# Patient Record
Sex: MALE | Race: WHITE | NOT HISPANIC OR LATINO | Employment: OTHER | URBAN - METROPOLITAN AREA
[De-identification: names, ages, dates, MRNs, and addresses within clinical notes are randomized per-mention and may not be internally consistent; named-entity substitution may affect disease eponyms.]

---

## 2020-12-02 ENCOUNTER — HOSPITAL ENCOUNTER (INPATIENT)
Facility: MEDICAL CENTER | Age: 75
LOS: 7 days | DRG: 871 | End: 2020-12-10
Attending: EMERGENCY MEDICINE | Admitting: STUDENT IN AN ORGANIZED HEALTH CARE EDUCATION/TRAINING PROGRAM
Payer: COMMERCIAL

## 2020-12-02 ENCOUNTER — APPOINTMENT (OUTPATIENT)
Dept: RADIOLOGY | Facility: MEDICAL CENTER | Age: 75
DRG: 871 | End: 2020-12-02
Attending: EMERGENCY MEDICINE
Payer: COMMERCIAL

## 2020-12-02 DIAGNOSIS — R53.81 DEBILITY: ICD-10-CM

## 2020-12-02 DIAGNOSIS — K52.9 ENTERITIS: ICD-10-CM

## 2020-12-02 DIAGNOSIS — E86.0 DEHYDRATION: ICD-10-CM

## 2020-12-02 DIAGNOSIS — N17.9 AKI (ACUTE KIDNEY INJURY) (HCC): ICD-10-CM

## 2020-12-02 DIAGNOSIS — R94.31 PROLONGED QT INTERVAL: ICD-10-CM

## 2020-12-02 PROCEDURE — 93005 ELECTROCARDIOGRAM TRACING: CPT | Performed by: EMERGENCY MEDICINE

## 2020-12-02 PROCEDURE — 96365 THER/PROPH/DIAG IV INF INIT: CPT

## 2020-12-02 PROCEDURE — 70450 CT HEAD/BRAIN W/O DYE: CPT

## 2020-12-02 PROCEDURE — 96367 TX/PROPH/DG ADDL SEQ IV INF: CPT

## 2020-12-02 PROCEDURE — 99285 EMERGENCY DEPT VISIT HI MDM: CPT

## 2020-12-02 ASSESSMENT — ENCOUNTER SYMPTOMS
VOMITING: 1
LOSS OF CONSCIOUSNESS: 1
NAUSEA: 1
FALLS: 1

## 2020-12-03 ENCOUNTER — APPOINTMENT (OUTPATIENT)
Dept: RADIOLOGY | Facility: MEDICAL CENTER | Age: 75
DRG: 871 | End: 2020-12-03
Attending: EMERGENCY MEDICINE
Payer: COMMERCIAL

## 2020-12-03 ENCOUNTER — APPOINTMENT (OUTPATIENT)
Dept: RADIOLOGY | Facility: MEDICAL CENTER | Age: 75
DRG: 871 | End: 2020-12-03
Attending: INTERNAL MEDICINE
Payer: COMMERCIAL

## 2020-12-03 PROBLEM — A41.9 SEPSIS (HCC): Status: ACTIVE | Noted: 2020-12-03

## 2020-12-03 PROBLEM — K56.7 ILEUS (HCC): Status: ACTIVE | Noted: 2020-12-03

## 2020-12-03 PROBLEM — N17.9 AKI (ACUTE KIDNEY INJURY) (HCC): Status: ACTIVE | Noted: 2020-12-03

## 2020-12-03 PROBLEM — I21.4 NSTEMI (NON-ST ELEVATED MYOCARDIAL INFARCTION) (HCC): Status: ACTIVE | Noted: 2020-12-03

## 2020-12-03 LAB
ALBUMIN SERPL BCP-MCNC: 3.7 G/DL (ref 3.2–4.9)
ALBUMIN/GLOB SERPL: 1.1 G/DL
ALP SERPL-CCNC: 84 U/L (ref 30–99)
ALT SERPL-CCNC: 11 U/L (ref 2–50)
ANION GAP SERPL CALC-SCNC: 16 MMOL/L (ref 7–16)
ANION GAP SERPL CALC-SCNC: 20 MMOL/L (ref 7–16)
ANION GAP SERPL CALC-SCNC: 26 MMOL/L (ref 7–16)
ANISOCYTOSIS BLD QL SMEAR: ABNORMAL
APPEARANCE UR: CLEAR
APTT PPP: 29.4 SEC (ref 24.7–36)
AST SERPL-CCNC: 16 U/L (ref 12–45)
BASOPHILS # BLD AUTO: 0 % (ref 0–1.8)
BASOPHILS # BLD AUTO: 0.1 % (ref 0–1.8)
BASOPHILS # BLD: 0 K/UL (ref 0–0.12)
BASOPHILS # BLD: 0.02 K/UL (ref 0–0.12)
BILIRUB SERPL-MCNC: 0.9 MG/DL (ref 0.1–1.5)
BILIRUB UR QL STRIP.AUTO: NEGATIVE
BUN SERPL-MCNC: 104 MG/DL (ref 8–22)
BUN SERPL-MCNC: 104 MG/DL (ref 8–22)
BUN SERPL-MCNC: 99 MG/DL (ref 8–22)
CALCIUM SERPL-MCNC: 10 MG/DL (ref 8.5–10.5)
CALCIUM SERPL-MCNC: 8.8 MG/DL (ref 8.5–10.5)
CALCIUM SERPL-MCNC: 8.9 MG/DL (ref 8.5–10.5)
CHLORIDE SERPL-SCNC: 103 MMOL/L (ref 96–112)
CHLORIDE SERPL-SCNC: 94 MMOL/L (ref 96–112)
CHLORIDE SERPL-SCNC: 98 MMOL/L (ref 96–112)
CO2 SERPL-SCNC: 19 MMOL/L (ref 20–33)
CO2 SERPL-SCNC: 20 MMOL/L (ref 20–33)
CO2 SERPL-SCNC: 21 MMOL/L (ref 20–33)
COLOR UR: YELLOW
COVID ORDER STATUS COVID19: NORMAL
CREAT SERPL-MCNC: 2.82 MG/DL (ref 0.5–1.4)
CREAT SERPL-MCNC: 2.87 MG/DL (ref 0.5–1.4)
CREAT SERPL-MCNC: 2.97 MG/DL (ref 0.5–1.4)
EKG IMPRESSION: NORMAL
EOSINOPHIL # BLD AUTO: 0 K/UL (ref 0–0.51)
EOSINOPHIL # BLD AUTO: 0 K/UL (ref 0–0.51)
EOSINOPHIL NFR BLD: 0 % (ref 0–6.9)
EOSINOPHIL NFR BLD: 0 % (ref 0–6.9)
ERYTHROCYTE [DISTWIDTH] IN BLOOD BY AUTOMATED COUNT: 44.5 FL (ref 35.9–50)
ERYTHROCYTE [DISTWIDTH] IN BLOOD BY AUTOMATED COUNT: 46.6 FL (ref 35.9–50)
FLUAV RNA SPEC QL NAA+PROBE: NEGATIVE
FLUBV RNA SPEC QL NAA+PROBE: NEGATIVE
GLOBULIN SER CALC-MCNC: 3.4 G/DL (ref 1.9–3.5)
GLUCOSE BLD-MCNC: 213 MG/DL (ref 65–99)
GLUCOSE BLD-MCNC: 253 MG/DL (ref 65–99)
GLUCOSE SERPL-MCNC: 249 MG/DL (ref 65–99)
GLUCOSE SERPL-MCNC: 259 MG/DL (ref 65–99)
GLUCOSE SERPL-MCNC: 323 MG/DL (ref 65–99)
GLUCOSE UR STRIP.AUTO-MCNC: NEGATIVE MG/DL
HCT VFR BLD AUTO: 57 % (ref 42–52)
HCT VFR BLD AUTO: 57.2 % (ref 42–52)
HGB BLD-MCNC: 18.8 G/DL (ref 14–18)
HGB BLD-MCNC: 19 G/DL (ref 14–18)
HYPOCHROMIA BLD QL SMEAR: ABNORMAL
IMM GRANULOCYTES # BLD AUTO: 0.08 K/UL (ref 0–0.11)
IMM GRANULOCYTES NFR BLD AUTO: 0.5 % (ref 0–0.9)
INR PPP: 1.18 (ref 0.87–1.13)
INR PPP: 1.21 (ref 0.87–1.13)
KETONES UR STRIP.AUTO-MCNC: NEGATIVE MG/DL
LACTATE BLD-SCNC: 2.8 MMOL/L (ref 0.5–2)
LACTATE BLD-SCNC: 3.6 MMOL/L (ref 0.5–2)
LEUKOCYTE ESTERASE UR QL STRIP.AUTO: NEGATIVE
LYMPHOCYTES # BLD AUTO: 1.52 K/UL (ref 1–4.8)
LYMPHOCYTES # BLD AUTO: 1.78 K/UL (ref 1–4.8)
LYMPHOCYTES NFR BLD: 12 % (ref 22–41)
LYMPHOCYTES NFR BLD: 15 % (ref 22–41)
MAGNESIUM SERPL-MCNC: 2.3 MG/DL (ref 1.5–2.5)
MAGNESIUM SERPL-MCNC: 2.6 MG/DL (ref 1.5–2.5)
MANUAL DIFF BLD: NORMAL
MCH RBC QN AUTO: 29.3 PG (ref 27–33)
MCH RBC QN AUTO: 29.9 PG (ref 27–33)
MCHC RBC AUTO-ENTMCNC: 33 G/DL (ref 33.7–35.3)
MCHC RBC AUTO-ENTMCNC: 33.2 G/DL (ref 33.7–35.3)
MCV RBC AUTO: 88.1 FL (ref 81.4–97.8)
MCV RBC AUTO: 90.8 FL (ref 81.4–97.8)
MICRO URNS: NORMAL
MICROCYTES BLD QL SMEAR: ABNORMAL
MONOCYTES # BLD AUTO: 0.93 K/UL (ref 0–0.85)
MONOCYTES # BLD AUTO: 1.11 K/UL (ref 0–0.85)
MONOCYTES NFR BLD AUTO: 7.5 % (ref 0–13.4)
MONOCYTES NFR BLD AUTO: 9.2 % (ref 0–13.4)
MORPHOLOGY BLD-IMP: NORMAL
NEUTROPHILS # BLD AUTO: 11.87 K/UL (ref 1.82–7.42)
NEUTROPHILS # BLD AUTO: 7.66 K/UL (ref 1.82–7.42)
NEUTROPHILS NFR BLD: 73.3 % (ref 44–72)
NEUTROPHILS NFR BLD: 79.9 % (ref 44–72)
NEUTS BAND NFR BLD MANUAL: 2.5 % (ref 0–10)
NITRITE UR QL STRIP.AUTO: NEGATIVE
NRBC # BLD AUTO: 0 K/UL
NRBC # BLD AUTO: 0 K/UL
NRBC BLD-RTO: 0 /100 WBC
NRBC BLD-RTO: 0 /100 WBC
PH UR STRIP.AUTO: 5 [PH] (ref 5–8)
PHOSPHATE SERPL-MCNC: 6.4 MG/DL (ref 2.5–4.5)
PLATELET # BLD AUTO: 215 K/UL (ref 164–446)
PLATELET # BLD AUTO: 304 K/UL (ref 164–446)
PLATELET BLD QL SMEAR: NORMAL
PMV BLD AUTO: 11.9 FL (ref 9–12.9)
PMV BLD AUTO: 12.3 FL (ref 9–12.9)
POTASSIUM SERPL-SCNC: 3.7 MMOL/L (ref 3.6–5.5)
POTASSIUM SERPL-SCNC: 3.9 MMOL/L (ref 3.6–5.5)
POTASSIUM SERPL-SCNC: 4.2 MMOL/L (ref 3.6–5.5)
PROCALCITONIN SERPL-MCNC: 0.26 NG/ML
PROCALCITONIN SERPL-MCNC: 0.27 NG/ML
PROT SERPL-MCNC: 7.1 G/DL (ref 6–8.2)
PROT UR QL STRIP: NEGATIVE MG/DL
PROTHROMBIN TIME: 15.4 SEC (ref 12–14.6)
PROTHROMBIN TIME: 15.7 SEC (ref 12–14.6)
RBC # BLD AUTO: 6.28 M/UL (ref 4.7–6.1)
RBC # BLD AUTO: 6.49 M/UL (ref 4.7–6.1)
RBC BLD AUTO: PRESENT
RBC UR QL AUTO: NEGATIVE
RSV RNA SPEC QL NAA+PROBE: NEGATIVE
SARS-COV-2 RNA RESP QL NAA+PROBE: NOTDETECTED
SODIUM SERPL-SCNC: 139 MMOL/L (ref 135–145)
SP GR UR STRIP.AUTO: 1.02
SPECIMEN SOURCE: NORMAL
TROPONIN T SERPL-MCNC: 39 NG/L (ref 6–19)
TROPONIN T SERPL-MCNC: 40 NG/L (ref 6–19)
UROBILINOGEN UR STRIP.AUTO-MCNC: 0.2 MG/DL
WBC # BLD AUTO: 10.1 K/UL (ref 4.8–10.8)
WBC # BLD AUTO: 14.9 K/UL (ref 4.8–10.8)

## 2020-12-03 PROCEDURE — 700105 HCHG RX REV CODE 258: Performed by: STUDENT IN AN ORGANIZED HEALTH CARE EDUCATION/TRAINING PROGRAM

## 2020-12-03 PROCEDURE — 82962 GLUCOSE BLOOD TEST: CPT | Mod: 91

## 2020-12-03 PROCEDURE — 700101 HCHG RX REV CODE 250: Performed by: STUDENT IN AN ORGANIZED HEALTH CARE EDUCATION/TRAINING PROGRAM

## 2020-12-03 PROCEDURE — 83735 ASSAY OF MAGNESIUM: CPT

## 2020-12-03 PROCEDURE — 700105 HCHG RX REV CODE 258: Performed by: NURSE PRACTITIONER

## 2020-12-03 PROCEDURE — 36415 COLL VENOUS BLD VENIPUNCTURE: CPT

## 2020-12-03 PROCEDURE — 85007 BL SMEAR W/DIFF WBC COUNT: CPT

## 2020-12-03 PROCEDURE — 85027 COMPLETE CBC AUTOMATED: CPT

## 2020-12-03 PROCEDURE — 51798 US URINE CAPACITY MEASURE: CPT

## 2020-12-03 PROCEDURE — 80048 BASIC METABOLIC PNL TOTAL CA: CPT

## 2020-12-03 PROCEDURE — 93005 ELECTROCARDIOGRAM TRACING: CPT | Performed by: STUDENT IN AN ORGANIZED HEALTH CARE EDUCATION/TRAINING PROGRAM

## 2020-12-03 PROCEDURE — 0241U HCHG SARS-COV-2 COVID-19 NFCT DS RESP RNA 4 TRGT MIC: CPT

## 2020-12-03 PROCEDURE — 81003 URINALYSIS AUTO W/O SCOPE: CPT

## 2020-12-03 PROCEDURE — 74176 CT ABD & PELVIS W/O CONTRAST: CPT

## 2020-12-03 PROCEDURE — 80053 COMPREHEN METABOLIC PANEL: CPT

## 2020-12-03 PROCEDURE — 700111 HCHG RX REV CODE 636 W/ 250 OVERRIDE (IP): Performed by: STUDENT IN AN ORGANIZED HEALTH CARE EDUCATION/TRAINING PROGRAM

## 2020-12-03 PROCEDURE — 700111 HCHG RX REV CODE 636 W/ 250 OVERRIDE (IP): Performed by: EMERGENCY MEDICINE

## 2020-12-03 PROCEDURE — 83605 ASSAY OF LACTIC ACID: CPT | Mod: 91

## 2020-12-03 PROCEDURE — 85730 THROMBOPLASTIN TIME PARTIAL: CPT

## 2020-12-03 PROCEDURE — 700105 HCHG RX REV CODE 258: Performed by: EMERGENCY MEDICINE

## 2020-12-03 PROCEDURE — 85025 COMPLETE CBC W/AUTO DIFF WBC: CPT

## 2020-12-03 PROCEDURE — 85610 PROTHROMBIN TIME: CPT

## 2020-12-03 PROCEDURE — A9270 NON-COVERED ITEM OR SERVICE: HCPCS | Performed by: STUDENT IN AN ORGANIZED HEALTH CARE EDUCATION/TRAINING PROGRAM

## 2020-12-03 PROCEDURE — 700102 HCHG RX REV CODE 250 W/ 637 OVERRIDE(OP): Performed by: STUDENT IN AN ORGANIZED HEALTH CARE EDUCATION/TRAINING PROGRAM

## 2020-12-03 PROCEDURE — 770006 HCHG ROOM/CARE - MED/SURG/GYN SEMI*

## 2020-12-03 PROCEDURE — 71045 X-RAY EXAM CHEST 1 VIEW: CPT

## 2020-12-03 PROCEDURE — 76775 US EXAM ABDO BACK WALL LIM: CPT

## 2020-12-03 PROCEDURE — 84145 PROCALCITONIN (PCT): CPT | Mod: 91

## 2020-12-03 PROCEDURE — 87040 BLOOD CULTURE FOR BACTERIA: CPT | Mod: 91

## 2020-12-03 PROCEDURE — 84100 ASSAY OF PHOSPHORUS: CPT

## 2020-12-03 PROCEDURE — 99223 1ST HOSP IP/OBS HIGH 75: CPT | Performed by: STUDENT IN AN ORGANIZED HEALTH CARE EDUCATION/TRAINING PROGRAM

## 2020-12-03 PROCEDURE — 84484 ASSAY OF TROPONIN QUANT: CPT

## 2020-12-03 RX ORDER — MAGNESIUM SULFATE HEPTAHYDRATE 40 MG/ML
2 INJECTION, SOLUTION INTRAVENOUS ONCE
Status: COMPLETED | OUTPATIENT
Start: 2020-12-03 | End: 2020-12-03

## 2020-12-03 RX ORDER — CARVEDILOL 25 MG/1
25 TABLET ORAL 2 TIMES DAILY WITH MEALS
Status: DISCONTINUED | OUTPATIENT
Start: 2020-12-03 | End: 2020-12-08

## 2020-12-03 RX ORDER — VITAMIN B COMPLEX
1000 TABLET ORAL DAILY
Status: DISCONTINUED | OUTPATIENT
Start: 2020-12-04 | End: 2020-12-10 | Stop reason: HOSPADM

## 2020-12-03 RX ORDER — SODIUM CHLORIDE, SODIUM LACTATE, POTASSIUM CHLORIDE, AND CALCIUM CHLORIDE .6; .31; .03; .02 G/100ML; G/100ML; G/100ML; G/100ML
1000 INJECTION, SOLUTION INTRAVENOUS ONCE
Status: COMPLETED | OUTPATIENT
Start: 2020-12-03 | End: 2020-12-03

## 2020-12-03 RX ORDER — ATORVASTATIN CALCIUM 20 MG/1
20 TABLET, FILM COATED ORAL NIGHTLY
Status: DISCONTINUED | OUTPATIENT
Start: 2020-12-03 | End: 2020-12-10 | Stop reason: HOSPADM

## 2020-12-03 RX ORDER — SODIUM CHLORIDE, SODIUM LACTATE, POTASSIUM CHLORIDE, CALCIUM CHLORIDE 600; 310; 30; 20 MG/100ML; MG/100ML; MG/100ML; MG/100ML
INJECTION, SOLUTION INTRAVENOUS CONTINUOUS
Status: DISCONTINUED | OUTPATIENT
Start: 2020-12-03 | End: 2020-12-10

## 2020-12-03 RX ORDER — LABETALOL HYDROCHLORIDE 5 MG/ML
10 INJECTION, SOLUTION INTRAVENOUS EVERY 4 HOURS PRN
Status: DISCONTINUED | OUTPATIENT
Start: 2020-12-03 | End: 2020-12-10 | Stop reason: HOSPADM

## 2020-12-03 RX ORDER — ALOGLIPTIN 12.5 MG/1
12.5 TABLET, FILM COATED ORAL DAILY
Status: ON HOLD | COMMUNITY
End: 2021-01-04

## 2020-12-03 RX ORDER — TETRACYCLINE HYDROCHLORIDE 250 MG/1
250 CAPSULE ORAL 2 TIMES DAILY
Status: ON HOLD | COMMUNITY
End: 2020-12-29

## 2020-12-03 RX ORDER — MULTIVIT WITH MINERALS/LUTEIN
1000 TABLET ORAL DAILY
Status: DISCONTINUED | OUTPATIENT
Start: 2020-12-03 | End: 2020-12-03

## 2020-12-03 RX ORDER — AMOXICILLIN 250 MG
2 CAPSULE ORAL 2 TIMES DAILY
Status: DISCONTINUED | OUTPATIENT
Start: 2020-12-03 | End: 2020-12-04

## 2020-12-03 RX ORDER — DEXTROSE MONOHYDRATE 25 G/50ML
50 INJECTION, SOLUTION INTRAVENOUS ONCE
Status: DISCONTINUED | OUTPATIENT
Start: 2020-12-03 | End: 2020-12-03

## 2020-12-03 RX ORDER — LISINOPRIL AND HYDROCHLOROTHIAZIDE 25; 20 MG/1; MG/1
1 TABLET ORAL DAILY
Status: ON HOLD | COMMUNITY
End: 2020-12-10

## 2020-12-03 RX ORDER — GLIPIZIDE 5 MG/1
5 TABLET ORAL 2 TIMES DAILY
Status: ON HOLD | COMMUNITY
End: 2021-01-03

## 2020-12-03 RX ORDER — LEVOFLOXACIN 5 MG/ML
750 INJECTION, SOLUTION INTRAVENOUS
Status: DISCONTINUED | OUTPATIENT
Start: 2020-12-03 | End: 2020-12-04

## 2020-12-03 RX ORDER — METRONIDAZOLE 500 MG/1
500 TABLET ORAL 2 TIMES DAILY
Status: ON HOLD | COMMUNITY
End: 2020-12-29

## 2020-12-03 RX ORDER — ACETAMINOPHEN 325 MG/1
650 TABLET ORAL EVERY 6 HOURS PRN
Status: DISCONTINUED | OUTPATIENT
Start: 2020-12-03 | End: 2020-12-10 | Stop reason: HOSPADM

## 2020-12-03 RX ORDER — SODIUM CHLORIDE 9 MG/ML
1000 INJECTION, SOLUTION INTRAVENOUS ONCE
Status: COMPLETED | OUTPATIENT
Start: 2020-12-03 | End: 2020-12-03

## 2020-12-03 RX ORDER — MULTIVIT WITH MINERALS/LUTEIN
1000 TABLET ORAL DAILY
COMMUNITY
End: 2020-12-03

## 2020-12-03 RX ORDER — DEXTROSE MONOHYDRATE 25 G/50ML
50 INJECTION, SOLUTION INTRAVENOUS
Status: DISCONTINUED | OUTPATIENT
Start: 2020-12-03 | End: 2020-12-10 | Stop reason: HOSPADM

## 2020-12-03 RX ORDER — VITAMIN B COMPLEX
1000 TABLET ORAL DAILY
Status: ON HOLD | COMMUNITY
End: 2021-01-04 | Stop reason: SDUPTHER

## 2020-12-03 RX ORDER — BISACODYL 10 MG
10 SUPPOSITORY, RECTAL RECTAL
Status: DISCONTINUED | OUTPATIENT
Start: 2020-12-03 | End: 2020-12-04

## 2020-12-03 RX ORDER — POLYETHYLENE GLYCOL 3350 17 G/17G
1 POWDER, FOR SOLUTION ORAL
Status: DISCONTINUED | OUTPATIENT
Start: 2020-12-03 | End: 2020-12-04

## 2020-12-03 RX ORDER — LISINOPRIL 10 MG/1
10 TABLET ORAL DAILY
COMMUNITY
End: 2020-12-03

## 2020-12-03 RX ORDER — LISINOPRIL 20 MG/1
20 TABLET ORAL DAILY
Status: DISCONTINUED | OUTPATIENT
Start: 2020-12-03 | End: 2020-12-10 | Stop reason: HOSPADM

## 2020-12-03 RX ORDER — ATORVASTATIN CALCIUM 40 MG/1
40 TABLET, FILM COATED ORAL NIGHTLY
Status: ON HOLD | COMMUNITY
End: 2021-01-04 | Stop reason: SDUPTHER

## 2020-12-03 RX ORDER — PANTOPRAZOLE SODIUM 40 MG/1
40 TABLET, DELAYED RELEASE ORAL 2 TIMES DAILY
COMMUNITY
End: 2021-01-02

## 2020-12-03 RX ORDER — CARVEDILOL 25 MG/1
25 TABLET ORAL 2 TIMES DAILY WITH MEALS
Status: ON HOLD | COMMUNITY
End: 2020-12-10

## 2020-12-03 RX ADMIN — INSULIN HUMAN 3 UNITS: 100 INJECTION, SOLUTION PARENTERAL at 21:47

## 2020-12-03 RX ADMIN — LEVOFLOXACIN 750 MG: 750 INJECTION, SOLUTION INTRAVENOUS at 10:17

## 2020-12-03 RX ADMIN — METRONIDAZOLE 500 MG: 500 INJECTION, SOLUTION INTRAVENOUS at 21:48

## 2020-12-03 RX ADMIN — SODIUM CHLORIDE, POTASSIUM CHLORIDE, SODIUM LACTATE AND CALCIUM CHLORIDE: 600; 310; 30; 20 INJECTION, SOLUTION INTRAVENOUS at 04:40

## 2020-12-03 RX ADMIN — APIXABAN 5 MG: 5 TABLET, FILM COATED ORAL at 17:22

## 2020-12-03 RX ADMIN — SODIUM CHLORIDE, POTASSIUM CHLORIDE, SODIUM LACTATE AND CALCIUM CHLORIDE 1000 ML: 600; 310; 30; 20 INJECTION, SOLUTION INTRAVENOUS at 15:40

## 2020-12-03 RX ADMIN — SODIUM CHLORIDE, POTASSIUM CHLORIDE, SODIUM LACTATE AND CALCIUM CHLORIDE 1000 ML: 600; 310; 30; 20 INJECTION, SOLUTION INTRAVENOUS at 04:40

## 2020-12-03 RX ADMIN — METRONIDAZOLE 500 MG: 500 INJECTION, SOLUTION INTRAVENOUS at 05:26

## 2020-12-03 RX ADMIN — SODIUM CHLORIDE 1000 ML: 9 INJECTION, SOLUTION INTRAVENOUS at 01:36

## 2020-12-03 RX ADMIN — SODIUM CHLORIDE, POTASSIUM CHLORIDE, SODIUM LACTATE AND CALCIUM CHLORIDE: 600; 310; 30; 20 INJECTION, SOLUTION INTRAVENOUS at 23:25

## 2020-12-03 RX ADMIN — SODIUM CHLORIDE, POTASSIUM CHLORIDE, SODIUM LACTATE AND CALCIUM CHLORIDE: 600; 310; 30; 20 INJECTION, SOLUTION INTRAVENOUS at 16:42

## 2020-12-03 RX ADMIN — METRONIDAZOLE 500 MG: 500 INJECTION, SOLUTION INTRAVENOUS at 16:41

## 2020-12-03 RX ADMIN — MAGNESIUM SULFATE 2 G: 2 INJECTION INTRAVENOUS at 00:57

## 2020-12-03 RX ADMIN — ATORVASTATIN CALCIUM 20 MG: 20 TABLET, FILM COATED ORAL at 21:47

## 2020-12-03 SDOH — HEALTH STABILITY: MENTAL HEALTH: HOW OFTEN DO YOU HAVE A DRINK CONTAINING ALCOHOL?: NEVER

## 2020-12-03 ASSESSMENT — CHA2DS2 SCORE
CHF OR LEFT VENTRICULAR DYSFUNCTION: NO
VASCULAR DISEASE: YES
SEX: MALE
DIABETES: NO
PRIOR STROKE OR TIA OR THROMBOEMBOLISM: NO
AGE 65 TO 74: NO
AGE 75 OR GREATER: YES
HYPERTENSION: YES
CHA2DS2 VASC SCORE: 4

## 2020-12-03 ASSESSMENT — ENCOUNTER SYMPTOMS
BRUISES/BLEEDS EASILY: 0
DIARRHEA: 0
NAUSEA: 1
PALPITATIONS: 0
BACK PAIN: 0
SORE THROAT: 0
COUGH: 0
LOSS OF CONSCIOUSNESS: 0
CHILLS: 0
SHORTNESS OF BREATH: 0
FALLS: 1
TINGLING: 0
DEPRESSION: 0
WEAKNESS: 0
HEADACHES: 0
INSOMNIA: 0
CONSTIPATION: 0
ABDOMINAL PAIN: 0
DOUBLE VISION: 0
FEVER: 0
BLOOD IN STOOL: 0
VOMITING: 1
BLURRED VISION: 0
HEARTBURN: 1
FOCAL WEAKNESS: 0
WEAKNESS: 1
WHEEZING: 0
NECK PAIN: 0

## 2020-12-03 ASSESSMENT — LIFESTYLE VARIABLES
EVER FELT BAD OR GUILTY ABOUT YOUR DRINKING: NO
HAVE PEOPLE ANNOYED YOU BY CRITICIZING YOUR DRINKING: NO
HAVE YOU EVER FELT YOU SHOULD CUT DOWN ON YOUR DRINKING: NO
CONSUMPTION TOTAL: NEGATIVE
ON A TYPICAL DAY WHEN YOU DRINK ALCOHOL HOW MANY DRINKS DO YOU HAVE: 0
TOTAL SCORE: 0
AVERAGE NUMBER OF DAYS PER WEEK YOU HAVE A DRINK CONTAINING ALCOHOL: 0
DOES PATIENT WANT TO STOP DRINKING: NO
TOTAL SCORE: 0
HOW MANY TIMES IN THE PAST YEAR HAVE YOU HAD 5 OR MORE DRINKS IN A DAY: 0
EVER HAD A DRINK FIRST THING IN THE MORNING TO STEADY YOUR NERVES TO GET RID OF A HANGOVER: NO
ALCOHOL_USE: NO
TOTAL SCORE: 0

## 2020-12-03 ASSESSMENT — COGNITIVE AND FUNCTIONAL STATUS - GENERAL
MOBILITY SCORE: 12
STANDING UP FROM CHAIR USING ARMS: A LOT
DRESSING REGULAR UPPER BODY CLOTHING: A LOT
WALKING IN HOSPITAL ROOM: A LOT
HELP NEEDED FOR BATHING: A LOT
MOVING FROM LYING ON BACK TO SITTING ON SIDE OF FLAT BED: A LOT
TURNING FROM BACK TO SIDE WHILE IN FLAT BAD: A LITTLE
EATING MEALS: A LITTLE
SUGGESTED CMS G CODE MODIFIER DAILY ACTIVITY: CK
SUGGESTED CMS G CODE MODIFIER MOBILITY: CL
DRESSING REGULAR LOWER BODY CLOTHING: A LOT
PERSONAL GROOMING: A LITTLE
MOVING TO AND FROM BED TO CHAIR: A LOT
CLIMB 3 TO 5 STEPS WITH RAILING: TOTAL
DAILY ACTIVITIY SCORE: 14
TOILETING: A LOT

## 2020-12-03 ASSESSMENT — PAIN DESCRIPTION - PAIN TYPE
TYPE: ACUTE PAIN
TYPE: ACUTE PAIN

## 2020-12-03 ASSESSMENT — PATIENT HEALTH QUESTIONNAIRE - PHQ9
1. LITTLE INTEREST OR PLEASURE IN DOING THINGS: NOT AT ALL
SUM OF ALL RESPONSES TO PHQ9 QUESTIONS 1 AND 2: 0
2. FEELING DOWN, DEPRESSED, IRRITABLE, OR HOPELESS: NOT AT ALL

## 2020-12-03 ASSESSMENT — FIBROSIS 4 INDEX
FIB4 SCORE: 1.22
FIB4 SCORE: 1.22

## 2020-12-03 NOTE — PROGRESS NOTES
Attending Hospitalist today is Bekah GREEN starting at 0700. Please call this Physician for orders, updates and questions.

## 2020-12-03 NOTE — ED NOTES
This RN spoke to pharmacy in ED who stated they would have a [employer] tech speak with pt before verifying 0600 medications.

## 2020-12-03 NOTE — DISCHARGE PLANNING
"Bedside assessment done with pt's assistance. Pt lives alone and states he has no friends or family to help him. Pt currently feels inadequate to take care of himself, states he hasn't had a bath in a week. \"I need help cooking and cleaning too\". Meals on Wheels would be helpful at time of DC. Pt uses public transportation or Taxi to get where he needs to go. [facility]. Pt very concerned about going home and not being able to take care of self if he doesn't improve. Pt would benefit with a Skilled Nursing facility if necessary or Home Health. Pt states he has $20 for taxi on DC.     Care Transition Team Assessment    Information Source  Orientation : Oriented x 4  Information Given By: Patient  Who is responsible for making decisions for patient? : Patient         Elopement Risk  Legal Hold: No  Ambulatory or Self Mobile in Wheelchair: No-Not an Elopement Risk    Interdisciplinary Discharge Planning  Does Admitting Nurse Feel This Could be a Complex Discharge?: Yes(maybe depending on pt's needs)  [facility]  Lives with - Patient's Self Care Capacity: Alone and Unable to Care For Self  Support Systems: None  Housing / Facility: 1 Story Apartment / Condo  Do You Take your Prescribed Medications Regularly: Yes([facility] or [facility])  Able to Return to Previous ADL's: Future Time w/Therapy  Mobility Issues: No  Prior Services: None  Patient Prefers to be Discharged to:: Home, Rehab?(Depends)  Assistance Needed: Unknown at this Time  Durable Medical Equipment: Not Applicable    Discharge Preparedness  What are your discharge supports?: (none)  Prior Functional Level: Ambulatory, Needs Assist with Activities of Daily Living, Independent with Medication Management  Difficulity with ADLs: Bathing(pt states no bath in 7 days)  Difficulty with ADLs Comment: (not felt strong enough)  Difficulity with IADLs: Cooking, Driving, Laundry, " Shopping(house chores)  Difficulity with IADL Comments: (pt feels he needs help)    Functional Assesment  Prior Functional Level: Ambulatory, Needs Assist with Activities of Daily Living, Independent with Medication Management    Finances  Financial Barriers to Discharge: (Retired, SS)  Prescription Coverage: Yes    Vision / Hearing Impairment  Vision Impairment : (wears glasses)  Hearing Impairment : No    Values / Beliefs / Concerns  Values / Beliefs Concerns : No    Advance Directive  Advance Directive?: ([facility], encouraged to bring in copy)    Domestic Abuse  Have you ever been the victim of abuse or violence?: No    Psychological Assessment  History of Substance Abuse: None  History of Psychiatric Problems: No    Discharge Risks or Barriers  Discharge risks or barriers?: Transportation, Lives alone, no community support(failure to thrive state of mind)  Patient risk factors: Lack of outside supports, Lives alone and no community support, Vulnerable adult    Anticipated Discharge Information  Discharge Disposition: Still a Patient (30)  Discharge Address: [address] [place] [address]  Discharge Contact Phone Number: Trina pt states he has $20

## 2020-12-03 NOTE — ASSESSMENT & PLAN NOTE
Likely NSTEMI type II from demand ischemia  Trend troponins  Twelve-lead EKG ordered and pending  No chest pain on physical examination and likely component of ADRIENNE

## 2020-12-03 NOTE — ED NOTES
Received report from JEREL Canales. Assumed care of patient. Patient resting in bed,a wake and alert. Pt A&OX4. RR even and unlabored. Pt given urinal to urinate. Call light in reach. No other needs at this time.     Medications still not verified by pharmacy currently. Will administer all morning medications when verified.

## 2020-12-03 NOTE — ASSESSMENT & PLAN NOTE
This is Severe Sepsis Present on admission  SIRS criteria identified on my evaluation include: Tachycardia, with heart rate greater than 90 BPM, Tachypnea, with respirations greater than 20 per minute and Leukocytosis, with WBC greater than 12,000  Source of infection is suspected intra-abdominal  Clinical indicators of end organ dysfunction include Creatinine >2.0 (Without ESRD or CKD)  Sepsis protocol initiated  Fluid resuscitation ordered per protocol  IV antibiotics as appropriate for source of sepsis  Reassessment: I have reassessed the patient's hemodynamic status  End organ dysfunction include(s):  Acute kidney failure

## 2020-12-03 NOTE — ASSESSMENT & PLAN NOTE
Continue with IV fluid resuscitation, analgesics for pain and advance to soft diet as tolerated  C diff negative  Completed a course of antibiotics

## 2020-12-03 NOTE — ED PROVIDER NOTES
ED Provider Note    Scribed for Erma Dumont M.D. by Haley Nava. 12/2/2020, 11:44 PM.    I verified that the patient was wearing a mask and I was wearing appropriate PPE every time I entered the room. The patient's mask was on the patient at all times during my encounter except for a brief view of the oropharynx.    Means of arrival: EMS  History obtained from: Patient  History limited by: None    CHIEF COMPLAINT  Chief Complaint   Patient presents with   • T-5000 FALL       HPI  Bebeto Vega is a 75 y.o. male with past medical history significant for diabetes, hypertension, hyperlipidemia and paroxysmal atrial fibrillation on Eliquis who presents to the Emergency Department via EMS for syncope. Per EMS, he has been vomiting and hasn't been able to eat, drink, or take his medications since 11/25/2020. Today, he was walking to his fridge when he suddenly lost consciousness and fell back, hitting his head against the wall. He called EMS after he was unable to get up from the floor. The patient reports additional symptoms of weakness, nausea, vomiting; and denies diarrhea, numbness, or tingling. No other exacerbating or alleviating factors were noted. He is on Eliquis for a-fib but has been unable to take it due to his nausea and vomiting. He also denies having stents placed, recent heart attacks, and history of kidney problems.     REVIEW OF SYSTEMS  Review of Systems   Constitutional: Positive for malaise/fatigue. Negative for fever.   Respiratory: Negative for cough.    Cardiovascular: Negative for chest pain.   Gastrointestinal: Positive for nausea and vomiting. Negative for diarrhea.   Musculoskeletal: Positive for falls.        Head pain   Neurological: Positive for loss of consciousness and weakness. Negative for tingling.        No numbness   All other systems reviewed and are negative.      PAST MEDICAL HISTORY   has a past medical history of A-fib (HCC) and Diabetes (HCC).None pertinent    SURGICAL  "HISTORY  patient denies any surgical history    SOCIAL HISTORY  Social History     Tobacco Use   • Smoking status: Never Smoker   • Smokeless tobacco: Never Used   Substance Use Topics   • Alcohol use: Never     Frequency: Never   • Drug use: Never      Social History     Substance and Sexual Activity   Drug Use Never       FAMILY HISTORY  Family History   Family history unknown: Yes       CURRENT MEDICATIONS  Home Medications     Reviewed by Rico Patino R.N. (Registered Nurse) on 12/03/20 at 0002  Med List Status: Partial   Medication Last Dose Status   apixaban (ELIQUIS) 5mg Tab  Active   atorvastatin (LIPITOR) 20 MG Tab  Active   carvedilol (COREG) 12.5 MG Tab  Active   lisinopril (PRINIVIL) 10 MG Tab  Active   metFORMIN (GLUCOPHAGE) 500 MG Tab  Active   vitamin D (CHOLECALCIFEROL) 1000 Unit (25 mcg) Tab  Active   vitamin E (VITAMIN E) 1000 Unit (450 mg) Cap  Active                ALLERGIES  Allergies   Allergen Reactions   • Pcn [Penicillins]        PHYSICAL EXAM  VITAL SIGNS: /70   Pulse (!) 101   Temp 35.8 °C (96.5 °F) (Oral)   Resp 18   Ht 1.727 m (5' 8\")   Wt 74.8 kg (165 lb)   SpO2 99%   BMI 25.09 kg/m²   Vitals reviewed by myself.  Physical Exam  Nursing note and vitals reviewed.  Constitutional: Well-developed and well-nourished.  Dry heaving on exam  HENT: Head is normocephalic and atraumatic.  Eyes: extra-ocular movements intact  Cardiovascular: Tachycardic rate and regular rhythm. No murmur heard.  Pulmonary/Chest: Breath sounds normal. No wheezes or rales.   Abdominal: Soft and non-tender. No distention.    Musculoskeletal: Extremities exhibit normal range of motion without edema or tenderness.   Neurological: Awake and alert, cranial nerves II through XII intact, strength is 5 out of 5 in all extremities, sensation intact in all extremities, no focal neurologic deficits noted on exam  Skin: Skin is warm and dry. No rash.     DIAGNOSTIC STUDIES /  LABS  Labs Reviewed "   CBC WITH DIFFERENTIAL - Abnormal; Notable for the following components:       Result Value    WBC 14.9 (*)     RBC 6.28 (*)     Hemoglobin 18.8 (*)     Hematocrit 57.0 (*)     MCHC 33.0 (*)     Neutrophils-Polys 79.90 (*)     Lymphocytes 12.00 (*)     Neutrophils (Absolute) 11.87 (*)     Monos (Absolute) 1.11 (*)     All other components within normal limits    Narrative:     Indicate which anticoagulants the patient is on:->UNKNOWN   COMP METABOLIC PANEL - Abnormal; Notable for the following components:    Chloride 94 (*)     Co2 19 (*)     Anion Gap 26.0 (*)     Glucose 323 (*)     Bun 99 (*)     Creatinine 2.97 (*)     All other components within normal limits    Narrative:     Indicate which anticoagulants the patient is on:->UNKNOWN   TROPONIN - Abnormal; Notable for the following components:    Troponin T 40 (*)     All other components within normal limits    Narrative:     Indicate which anticoagulants the patient is on:->UNKNOWN   PROTHROMBIN TIME - Abnormal; Notable for the following components:    PT 15.4 (*)     INR 1.18 (*)     All other components within normal limits    Narrative:     Indicate which anticoagulants the patient is on:->UNKNOWN   PHOSPHORUS - Abnormal; Notable for the following components:    Phosphorus 6.4 (*)     All other components within normal limits    Narrative:     Indicate which anticoagulants the patient is on:->UNKNOWN   ESTIMATED GFR - Abnormal; Notable for the following components:    GFR If  25 (*)     GFR If Non  21 (*)     All other components within normal limits    Narrative:     Indicate which anticoagulants the patient is on:->UNKNOWN   APTT    Narrative:     Indicate which anticoagulants the patient is on:->UNKNOWN   COVID/SARS COV-2    Narrative:     Is patient being admitted?->Yes  Does this patient meet criteria for Rush/Cepheid per [facility]  Inpatient Workflow? (See workflow link below)->Yes  Expected turn around time?->Rush  (Cepheid 2-4 hours)  Have you been in close contact with a person who is suspected  or known to be positive for COVID-19 within the last 30 days  (e.g. last seen that person < 30 days ago)->No   MAGNESIUM    Narrative:     Indicate which anticoagulants the patient is on:->UNKNOWN   COV-2, FLU A/B, AND RSV BY PCR    Narrative:     Is patient being admitted?->Yes  Does this patient meet criteria for Rush/Cepheid per [facility]  Inpatient Workflow? (See workflow link below)->Yes  Expected turn around time?->Rush (Cepheid 2-4 hours)  Have you been in close contact with a person who is suspected  or known to be positive for COVID-19 within the last 30 days  (e.g. last seen that person < 30 days ago)->No   LACTIC ACID   LACTIC ACID   PROTHROMBIN TIME   BLOOD CULTURE   BLOOD CULTURE   MAGNESIUM   URINALYSIS   PROCALCITONIN       All labs reviewed by me.    EKG Interpretation:  Interpreted by myself    12 Lead EKG interpreted by me to show:  EKG at 12:19 AM: Sinus tachycardia, heart rate 108, left axis deviation, intervals notable for prolonged QT of 515, , QRS 94, left ventricular hypertrophy is present, no acute ST-T segment changes, no evidence of acute arrhythmia or ischemia  My impression of this EKG: Does not indicate ischemia or arrhythmia at this time.    RADIOLOGY  CT-ABDOMEN-PELVIS W/O   Final Result         1.  Fluid-filled distended colonic loops suggests ileus and/or enteritis.   2.  Fat-containing bilateral inguinal hernias   3.  Atherosclerosis and atherosclerotic coronary artery disease.      DX-CHEST-PORTABLE (1 VIEW)   Final Result         1.  No focal infiltrates.   2.  Perihilar interstitial prominence and bronchial wall cuffing, appearance suggests changes of underlying bronchial inflammation, consider bronchitis.      CT-HEAD W/O   Final Result         1.  No acute intracranial abnormality is identified, there are nonspecific white matter changes, commonly associated with small vessel ischemic  disease.  Associated mild cerebral atrophy is noted.   2.  Atherosclerosis.        The radiologist's interpretation of all radiological studies have been reviewed by me.    REASSESSMENT  11:43 PM - Patient seen and examined at bedside. Discussed plan of care, including ordering labs and imaging to evaluate. Patient agrees to the plan of care.     12:30 AM - Patient will be treated with magnesium sulfate 2 g    12:57 AM - Recheck: Patient re-evaluated at beside. Discussed patient's condition and treatment plan. Patient's lab and radiology results discussed. The patient understood and is in agreement.     1:07 AM - Patient will be treated with NS infusion 1000 mL    1:32 AM - Ordered additional imaging to evaluate.     1:40 AM - Nurse informed me the patient is hypotensive. I requested that administration of magnesium sulfate be stopped.     1:59 AM Recheck: Patient re-evaluated at beside. Patient reports feeling improved. Discussed patient's condition and treatment plan. Patient's lab and ra results discussed. The patient understood and is in agreement. I noted that the patient's blood pressure is 114/65 after fluids      HYDRATION: Based on the patient's presentation of Acute Vomiting, Dehydration and Tachycardia the patient was given IV fluids. IV Hydration was used because oral hydration was not adequate alone. Upon recheck following hydration, the patient was improved.      COURSE & MEDICAL DECISION MAKING  Nursing notes, VS, PMSFHx reviewed in chart.    Patient is a 75-year-old male who presents for syncope.  Differential diagnosis includes dehydration, enteritis, viral syndrome, diverticulitis, electrolyte disturbance, intracranial injury, arrhythmia.  Diagnostic work-up includes labs, EKG, CT of the head and abdomen.    Patient's initial vitals notable for tachycardia, patient clinically appears dehydrated.  He is treated with IV fluids and Zofran with some improvement in his symptoms.  Of note EKG returns and  demonstrates no evidence of arrhythmia but his QT is prolonged, therefore I attempted to give him magnesium, however approximately 5 minutes after receiving magnesium patient became hypotensive, magnesium was stopped and fluids were continued and hypotension resolved.  EKG otherwise showed no evidence of ischemia.  CT of the head demonstrates no acute intracranial traumatic injury.  Labs returned are notable for significant dehydration with acute kidney injury, creatinine 2.97 and BUN 99 with associated anion gap of 26 and bicarb of 19.  Glucose is also elevated, he is a diabetic but I do not believe insulin initiation is indicated at this time as he is severely dehydrated we will continue fluid resuscitation and see if his gap closes.  Covid swab returns and is negative.  Troponin is elevated at 40, likely related to his dehydration and renal injury as EKG is nonischemic.  CT of the abdomen returns and demonstrates enteritis.  At this time patient syncope most likely secondary to severe dehydration, patient will be hospitalized for ongoing work-up and management.  Discussed the case with Dr. Lauren who has accepted patient for hospitalization.  Patient is in guarded condition.      DISPOSITION:  Patient will be hospitalized by Dr. Lauren in guarded condition.    FINAL IMPRESSION  1. Enteritis    2. Prolonged QT interval    3. Dehydration    4. ADRIENNE (acute kidney injury) ([facility])          Haley MONTIEL (Scribe), am scribing for, and in the presence of, Erma Dumont M.D..    Electronically signed by: Haley Nava (Scribe), 12/2/2020    Erma MONTIEL M.D. personally performed the services described in this documentation, as scribed by Haley Nava in my presence, and it is both accurate and complete.    C    The note accurately reflects work and decisions made by me.  Erma Dumont M.D.  12/3/2020  4:22 AM

## 2020-12-03 NOTE — PROGRESS NOTES
AFTER [place] ADMISSION BY DR. OWENS    Mr. Vega is a 75-year-old male who presented to the emergency department on 12/2/2020 with complaints of fall.  He is unsure if he lost consciousness or not.  This was accompanied by nausea and vomiting x4 days which has subsequently caused poor p.o. intake and severe dehydration.  CT of the head done on admission showed no acute intracranial abnormalities.  A chest x-ray was performed and was significant only for bronchial inflammation indicating bronchitis.  A CT of the abdomen and pelvis without contrast was also performed and revealed fluid-filled distended colonic loops suggesting ileus and/or enteritis.  He was noted to be hypotensive while in the ED and was additionally found to have sepsis in addition to acute kidney injury.  He was subsequently started on IV fluid resuscitation and admitted to the hospital for further evaluation and treatment.    Plan:  1. Labs rechecked at 830 this morning showed an interval decrease in his anion gap, glucose, and a slight decrease in his creatinine level.  Lactic acid has also decreased from 3.6-2.8.  Troponin levels are mildly elevated, have peaked and are now trending down.  Procalcitonin also mildly elevated.    2.  ADRIENNE work-up:   -Bladder scan done and reviewed, 230 cc present within the bladder.    -Renal ultrasound pending.  -Obtain urine sodium and creatinine in order to calculate FeNa.   -Urinalysis negative for infection.  -Recheck BMP tomorrow.   -Increase maintenance IV fluid rate.    3.  Tolerating water. No abdominal pain on exam, no vomiting since admission.  Does have nausea but would like to try clear liquids today.  Diet advanced and will continue to advance as tolerated. Will back down if not tolerated. Currently feels like he has to have a bowel movement.  Continue course of Levaquin and Flagyl.     4.  Hold antihypertensive medications for now.        Bekah Flores, MSN, RN, APRN, ACNPC-AG, CCRN  Nurse  Practitioner, [facility]  [phone]    12/3/2020    12:50 PM

## 2020-12-03 NOTE — H&P
Hospital Medicine History & Physical Note    Date of Service  12/3/2020    Primary Care Physician  No primary care provider on file.    Consultants  None    Code Status  Full Code    Chief Complaint  Chief Complaint   Patient presents with   • T-5000 FALL       History of Presenting Illness  75 y.o. male who presented 12/2/2020 with complaints of fall without loss of consciousness, nausea and vomiting, dehydration and poor p.o. intake x 4 days. He denies presyncopal signs and symptoms inclusive of headache, blurred vision, incoordination or dizziness.  Patient denies having a bowel movement for the past few days and states having significant difficulty keeping his food down.  He denies any abdominal pain or discomfort but states he does feel somewhat bloated.      Vital signs on admission revealed: 96.5, 101, 18, 126/70, 99% room air.  Labs were significant for the following: WBC 14.9, H/H 18.8/57.0, platelet count 304.  Troponin T minimally elevated at 40, INR 1.18, phosphorus 6.4, magnesium 2.6 and found to be Covid negative.    CT head revealed nonspecific white matter changes associated with small vessel ischemic changes and mild cerebral atrophy noted.  No intracranial abnormality identified.  Portable chest x-ray was performed and revealed no focal infiltrative process and possible bronchial inflammation indicating bronchitis.  CT abdomen pelvis without contrast revealed fluid-filled distended colonic loops suggesting ileus and/or enteritis.  Also noted to have fat-containing bilateral inguinal hernias and atherosclerosis.     Patient agrees to full CODE STATUS and was noted to be hypotensive while in ED.  Intensivist was informed of patient's condition and unstable hemodynamics however patient's blood pressure subsequently increased after fluid resuscitation.  Patient will be admitted to medical [facility] floor for further optimization of medical management and treatment for his ileus/enteritis and  sepsis.    Review of Systems  Review of Systems   Constitutional: Positive for malaise/fatigue. Negative for chills and fever.   HENT: Negative for congestion and sore throat.    Eyes: Negative for blurred vision and double vision.   Respiratory: Negative for cough, shortness of breath and wheezing.    Cardiovascular: Negative for chest pain and palpitations.   Gastrointestinal: Positive for heartburn, nausea and vomiting. Negative for abdominal pain, blood in stool, constipation, diarrhea and melena.   Genitourinary: Negative for dysuria and frequency.   Musculoskeletal: Positive for falls. Negative for back pain and neck pain.   Skin: Negative for itching and rash.   Neurological: Negative for focal weakness, loss of consciousness, weakness and headaches.   Endo/Heme/Allergies: Negative for environmental allergies. Does not bruise/bleed easily.   Psychiatric/Behavioral: Negative for depression. The patient does not have insomnia.        Past Medical History   has a past medical history of A-fib ([facility]) and Diabetes ([facility]).    Surgical History   has no past surgical history on file.     Family History  Family history is unknown by patient.     Social History   reports that he has never smoked. He has never used smokeless tobacco. He reports that he does not drink alcohol or use drugs.    Allergies  Allergies   Allergen Reactions   • Pcn [Penicillins] Anaphylaxis       Medications  Prior to Admission Medications   Prescriptions Last Dose Informant Patient Reported? Taking?   apixaban (ELIQUIS) 5mg Tab   Yes Yes   Sig: Take 5 mg by mouth 2 Times a Day.   atorvastatin (LIPITOR) 20 MG Tab   Yes Yes   Sig: Take 20 mg by mouth every evening.   carvedilol (COREG) 12.5 MG Tab   Yes Yes   Sig: Take 12.5 mg by mouth 2 times a day, with meals.   lisinopril (PRINIVIL) 10 MG Tab   Yes Yes   Sig: Take 10 mg by mouth every day.   metFORMIN (GLUCOPHAGE) 500 MG Tab   Yes Yes   Sig: Take 500 mg by mouth 2 times a day, with meals.    vitamin D (CHOLECALCIFEROL) 1000 Unit (25 mcg) Tab   Yes Yes   Sig: Take 1,000 Units by mouth every day.   vitamin E (VITAMIN E) 1000 Unit (450 mg) Cap   Yes Yes   Sig: Take 1,000 Units by mouth every day.      Facility-Administered Medications: None       Physical Exam  Temp:  [35.8 °C (96.5 °F)] 35.8 °C (96.5 °F)  Pulse:  [] 109  Resp:  [18-25] 22  BP: ()/(50-95) 128/95  SpO2:  [90 %-99 %] 98 %    Physical Exam  Vitals signs reviewed.   Constitutional:       Appearance: He is normal weight. He is ill-appearing. He is not toxic-appearing or diaphoretic.      Comments: Frail, ill-appearing, no acute distress, pleasant cooperative, appears age greater than stated.   HENT:      Head: Normocephalic and atraumatic.      Mouth/Throat:      Mouth: Mucous membranes are moist.      Pharynx: Oropharynx is clear. No oropharyngeal exudate or posterior oropharyngeal erythema.   Eyes:      General: No scleral icterus.     Extraocular Movements: Extraocular movements intact.      Conjunctiva/sclera: Conjunctivae normal.      Pupils: Pupils are equal, round, and reactive to light.   Neck:      Musculoskeletal: Normal range of motion and neck supple. No muscular tenderness.      Vascular: No carotid bruit.   Cardiovascular:      Rate and Rhythm: Normal rate and regular rhythm.      Pulses: Normal pulses.      Heart sounds: Normal heart sounds. No murmur. No friction rub. No gallop.    Pulmonary:      Effort: Pulmonary effort is normal.      Breath sounds: Normal breath sounds. No wheezing, rhonchi or rales.   Abdominal:      General: Bowel sounds are normal. There is distension.      Palpations: Abdomen is soft. There is no mass.      Tenderness: There is no abdominal tenderness. There is no guarding or rebound.      Hernia: No hernia is present.   Musculoskeletal: Normal range of motion.      Right lower leg: No edema.      Left lower leg: No edema.   Lymphadenopathy:      Cervical: No cervical adenopathy.   Skin:      General: Skin is warm and dry.      Capillary Refill: Capillary refill takes less than 2 seconds.      Coloration: Skin is not pale.      Findings: No erythema or rash.   Neurological:      General: No focal deficit present.      Mental Status: He is alert and oriented to person, place, and time. Mental status is at baseline.      Cranial Nerves: No cranial nerve deficit.      Sensory: No sensory deficit.      Motor: No weakness.   Psychiatric:         Mood and Affect: Mood normal.         Behavior: Behavior normal.         Thought Content: Thought content normal.         Judgment: Judgment normal.         Laboratory:  Recent Labs     12/03/20  0045   WBC 14.9*   RBC 6.28*   HEMOGLOBIN 18.8*   HEMATOCRIT 57.0*   MCV 90.8   MCH 29.9   MCHC 33.0*   RDW 46.6   PLATELETCT 304   MPV 12.3     Recent Labs     12/03/20 0045   SODIUM 139   POTASSIUM 4.2   CHLORIDE 94*   CO2 19*   GLUCOSE 323*   BUN 99*   CREATININE 2.97*   CALCIUM 10.0     Recent Labs     12/03/20  0045   ALTSGPT 11   ASTSGOT 16   ALKPHOSPHAT 84   TBILIRUBIN 0.9   GLUCOSE 323*     Recent Labs     12/03/20  0045 12/03/20  0401   APTT 29.4  --    INR 1.18* 1.21*     No results for input(s): NTPROBNP in the last 72 hours.      Recent Labs     12/03/20 0045   TROPONINT 40*       Imaging:  CT-ABDOMEN-PELVIS W/O   Final Result         1.  Fluid-filled distended colonic loops suggests ileus and/or enteritis.   2.  Fat-containing bilateral inguinal hernias   3.  Atherosclerosis and atherosclerotic coronary artery disease.      DX-CHEST-PORTABLE (1 VIEW)   Final Result         1.  No focal infiltrates.   2.  Perihilar interstitial prominence and bronchial wall cuffing, appearance suggests changes of underlying bronchial inflammation, consider bronchitis.      CT-HEAD W/O   Final Result         1.  No acute intracranial abnormality is identified, there are nonspecific white matter changes, commonly associated with small vessel ischemic disease.  Associated mild  cerebral atrophy is noted.   2.  Atherosclerosis.          I reviewed the above CT scan and appreciate fluid-filled distended colon suggesting ileus.     Twelve-lead EKG ordered and pending.    Assessment/Plan:  I anticipate this patient will require at least two midnights for appropriate medical management, necessitating inpatient admission.    * Sepsis (HCC)- (present on admission)  Assessment & Plan  This is Severe Sepsis Present on admission  SIRS criteria identified on my evaluation include: Tachycardia, with heart rate greater than 90 BPM, Tachypnea, with respirations greater than 20 per minute and Leukocytosis, with WBC greater than 12,000  Source of infection is suspected intra-abdominal  Clinical indicators of end organ dysfunction include Creatinine >2.0 (Without ESRD or CKD)  Sepsis protocol initiated  Fluid resuscitation ordered per protocol  IV antibiotics as appropriate for source of sepsis  Reassessment: I have reassessed the patient's hemodynamic status  End organ dysfunction include(s):  Acute kidney failure      ADRIENNE (acute kidney injury) ([facility])- (present on admission)  Assessment & Plan  Urinalysis ordered and pending  Likely secondary to sepsis compounded with hypotension leading to poor renal perfusion.  Monitor urine output and consider bladder scan if an uric/oliguric    NSTEMI (non-ST elevated myocardial infarction) ([facility])- (present on admission)  Assessment & Plan  Likely NSTEMI type II from demand ischemia  Trend troponins  Twelve-lead EKG ordered and pending  No chest pain on physical examination and likely component of ADRIENNE    Ileus ([facility])- (present on admission)  Assessment & Plan  Continue with fluid resuscitation, analgesics for pain, n.p.o. sips with meds and slow transition to clear liquid diet if tolerated.  No indication for nasogastric decompression at this time.

## 2020-12-03 NOTE — ED NOTES
Pt transferred to hospital bed, repositioned for comfort. Blood work and UA obtained, sent to lab for testing. Call light in reach. No needs/concerns at this time.

## 2020-12-03 NOTE — ASSESSMENT & PLAN NOTE
Likely secondary to sepsis compounded with hypotension leading to poor renal perfusion.  Monitor urine output and consider bladder scan if an uric/oliguric  Resolved

## 2020-12-03 NOTE — ED NOTES
Med rec completed per pt and home pharmacy ([facility])  Allergies reviewed     Pt started he hasn't taken his medications in about 5 days    Pt recently completed a 14 day course of Tetracycline and Flagyl

## 2020-12-03 NOTE — ED TRIAGE NOTES
"Chief Complaint   Patient presents with   • T-5000 FALL     Patient brought in by EMS for Code TBI, patient has been feeling unwell since 11/25, symptoms include: nausea, vomiting, and weakness. Denies knowingly being around anyone with Covid.  Patient got up to go to the fridge, and fell.  Hitting his head against the door.  Patient repots taking Eliquis (unknown dose) for A Fib.  Patient remembers event.  Patient was able to call EMS, when he had trouble getting off the floor.     No trauma noted, skin intact, patient is AOx4, able to move extremities, equal  strength, PERRLA, denies numbness/tingling.      EMS established 20G PIV in LAC, gave 4mg Zofran, and 250mL NS.     Patient greeted by ERP, taken to CT on monitor to this RN.  In room Blue 16. Report to RN.     /70   Pulse (!) 101   Temp 35.8 °C (96.5 °F) (Oral)   Resp 18   Ht 1.727 m (5' 8\")   Wt 74.8 kg (165 lb)   SpO2 99%   BMI 25.09 kg/m²     "

## 2020-12-03 NOTE — ED NOTES
This RN paged admitting provider about pt BP. Admitting provider gave verbal order to call him back if MAP is less than 65 at 0700. Will continue to monitor.

## 2020-12-04 PROBLEM — K52.9 ENTERITIS: Status: ACTIVE | Noted: 2020-12-03

## 2020-12-04 PROBLEM — E87.6 HYPOKALEMIA: Status: ACTIVE | Noted: 2020-12-04

## 2020-12-04 LAB
ANION GAP SERPL CALC-SCNC: 13 MMOL/L (ref 7–16)
BUN SERPL-MCNC: 97 MG/DL (ref 8–22)
C DIFF DNA SPEC QL NAA+PROBE: NEGATIVE
C DIFF TOX GENS STL QL NAA+PROBE: NEGATIVE
CALCIUM SERPL-MCNC: 8.3 MG/DL (ref 8.5–10.5)
CHLORIDE SERPL-SCNC: 106 MMOL/L (ref 96–112)
CO2 SERPL-SCNC: 21 MMOL/L (ref 20–33)
CREAT SERPL-MCNC: 2.21 MG/DL (ref 0.5–1.4)
ERYTHROCYTE [DISTWIDTH] IN BLOOD BY AUTOMATED COUNT: 44.5 FL (ref 35.9–50)
EST. AVERAGE GLUCOSE BLD GHB EST-MCNC: 157 MG/DL
GLUCOSE BLD-MCNC: 173 MG/DL (ref 65–99)
GLUCOSE BLD-MCNC: 217 MG/DL (ref 65–99)
GLUCOSE BLD-MCNC: 273 MG/DL (ref 65–99)
GLUCOSE SERPL-MCNC: 252 MG/DL (ref 65–99)
HBA1C MFR BLD: 7.1 % (ref 0–5.6)
HCT VFR BLD AUTO: 45.3 % (ref 42–52)
HGB BLD-MCNC: 15.6 G/DL (ref 14–18)
MAGNESIUM SERPL-MCNC: 2 MG/DL (ref 1.5–2.5)
MCH RBC QN AUTO: 30.2 PG (ref 27–33)
MCHC RBC AUTO-ENTMCNC: 34.4 G/DL (ref 33.7–35.3)
MCV RBC AUTO: 87.6 FL (ref 81.4–97.8)
PLATELET # BLD AUTO: 225 K/UL (ref 164–446)
PMV BLD AUTO: 11.8 FL (ref 9–12.9)
POTASSIUM SERPL-SCNC: 3 MMOL/L (ref 3.6–5.5)
RBC # BLD AUTO: 5.17 M/UL (ref 4.7–6.1)
SODIUM SERPL-SCNC: 140 MMOL/L (ref 135–145)
WBC # BLD AUTO: 10 K/UL (ref 4.8–10.8)

## 2020-12-04 PROCEDURE — 700102 HCHG RX REV CODE 250 W/ 637 OVERRIDE(OP): Performed by: INTERNAL MEDICINE

## 2020-12-04 PROCEDURE — 770006 HCHG ROOM/CARE - MED/SURG/GYN SEMI*

## 2020-12-04 PROCEDURE — 87493 C DIFF AMPLIFIED PROBE: CPT

## 2020-12-04 PROCEDURE — 83036 HEMOGLOBIN GLYCOSYLATED A1C: CPT

## 2020-12-04 PROCEDURE — 36415 COLL VENOUS BLD VENIPUNCTURE: CPT

## 2020-12-04 PROCEDURE — 99232 SBSQ HOSP IP/OBS MODERATE 35: CPT | Performed by: INTERNAL MEDICINE

## 2020-12-04 PROCEDURE — 82962 GLUCOSE BLOOD TEST: CPT

## 2020-12-04 PROCEDURE — 80048 BASIC METABOLIC PNL TOTAL CA: CPT

## 2020-12-04 PROCEDURE — 83735 ASSAY OF MAGNESIUM: CPT

## 2020-12-04 PROCEDURE — 85027 COMPLETE CBC AUTOMATED: CPT

## 2020-12-04 PROCEDURE — 700101 HCHG RX REV CODE 250: Performed by: STUDENT IN AN ORGANIZED HEALTH CARE EDUCATION/TRAINING PROGRAM

## 2020-12-04 PROCEDURE — A9270 NON-COVERED ITEM OR SERVICE: HCPCS | Performed by: STUDENT IN AN ORGANIZED HEALTH CARE EDUCATION/TRAINING PROGRAM

## 2020-12-04 PROCEDURE — A9270 NON-COVERED ITEM OR SERVICE: HCPCS | Performed by: INTERNAL MEDICINE

## 2020-12-04 PROCEDURE — 700102 HCHG RX REV CODE 250 W/ 637 OVERRIDE(OP): Performed by: STUDENT IN AN ORGANIZED HEALTH CARE EDUCATION/TRAINING PROGRAM

## 2020-12-04 PROCEDURE — 97166 OT EVAL MOD COMPLEX 45 MIN: CPT

## 2020-12-04 PROCEDURE — 700105 HCHG RX REV CODE 258: Performed by: NURSE PRACTITIONER

## 2020-12-04 RX ORDER — METRONIDAZOLE 500 MG/1
500 TABLET ORAL EVERY 8 HOURS
Status: DISCONTINUED | OUTPATIENT
Start: 2020-12-04 | End: 2020-12-04

## 2020-12-04 RX ORDER — LEVOFLOXACIN 750 MG/1
750 TABLET, FILM COATED ORAL
Status: DISCONTINUED | OUTPATIENT
Start: 2020-12-04 | End: 2020-12-04

## 2020-12-04 RX ORDER — POTASSIUM CHLORIDE 20 MEQ/1
40 TABLET, EXTENDED RELEASE ORAL ONCE
Status: COMPLETED | OUTPATIENT
Start: 2020-12-04 | End: 2020-12-04

## 2020-12-04 RX ADMIN — INSULIN HUMAN 2 UNITS: 100 INJECTION, SOLUTION PARENTERAL at 17:49

## 2020-12-04 RX ADMIN — INSULIN HUMAN 1 UNITS: 100 INJECTION, SOLUTION PARENTERAL at 21:29

## 2020-12-04 RX ADMIN — APIXABAN 5 MG: 5 TABLET, FILM COATED ORAL at 17:49

## 2020-12-04 RX ADMIN — SODIUM CHLORIDE, POTASSIUM CHLORIDE, SODIUM LACTATE AND CALCIUM CHLORIDE: 600; 310; 30; 20 INJECTION, SOLUTION INTRAVENOUS at 20:06

## 2020-12-04 RX ADMIN — POTASSIUM CHLORIDE 40 MEQ: 1500 TABLET, EXTENDED RELEASE ORAL at 13:14

## 2020-12-04 RX ADMIN — ATORVASTATIN CALCIUM 20 MG: 20 TABLET, FILM COATED ORAL at 21:29

## 2020-12-04 RX ADMIN — LEVOFLOXACIN 750 MG: 750 TABLET, FILM COATED ORAL at 10:27

## 2020-12-04 RX ADMIN — METRONIDAZOLE 500 MG: 500 INJECTION, SOLUTION INTRAVENOUS at 05:26

## 2020-12-04 RX ADMIN — SODIUM CHLORIDE, POTASSIUM CHLORIDE, SODIUM LACTATE AND CALCIUM CHLORIDE: 600; 310; 30; 20 INJECTION, SOLUTION INTRAVENOUS at 06:15

## 2020-12-04 RX ADMIN — Medication 1000 UNITS: at 05:26

## 2020-12-04 RX ADMIN — INSULIN HUMAN 2 UNITS: 100 INJECTION, SOLUTION PARENTERAL at 09:06

## 2020-12-04 RX ADMIN — APIXABAN 5 MG: 5 TABLET, FILM COATED ORAL at 05:26

## 2020-12-04 RX ADMIN — INSULIN HUMAN 3 UNITS: 100 INJECTION, SOLUTION PARENTERAL at 13:14

## 2020-12-04 RX ADMIN — SODIUM CHLORIDE, POTASSIUM CHLORIDE, SODIUM LACTATE AND CALCIUM CHLORIDE: 600; 310; 30; 20 INJECTION, SOLUTION INTRAVENOUS at 13:11

## 2020-12-04 ASSESSMENT — COGNITIVE AND FUNCTIONAL STATUS - GENERAL
DAILY ACTIVITIY SCORE: 18
TOILETING: A LITTLE
SUGGESTED CMS G CODE MODIFIER DAILY ACTIVITY: CK
HELP NEEDED FOR BATHING: A LOT
DRESSING REGULAR LOWER BODY CLOTHING: A LITTLE
DRESSING REGULAR UPPER BODY CLOTHING: A LITTLE
PERSONAL GROOMING: A LITTLE

## 2020-12-04 ASSESSMENT — ENCOUNTER SYMPTOMS
FALLS: 1
NAUSEA: 1
FEVER: 0
BLOOD IN STOOL: 0
PALPITATIONS: 0
DIARRHEA: 1
NECK PAIN: 0
SEIZURES: 0
WHEEZING: 0
FLANK PAIN: 0
MYALGIAS: 0
VOMITING: 0
CHILLS: 0
ABDOMINAL PAIN: 1
WEAKNESS: 1
BRUISES/BLEEDS EASILY: 0
DIAPHORESIS: 0
SHORTNESS OF BREATH: 0
DIZZINESS: 0
BLURRED VISION: 0
SPUTUM PRODUCTION: 0
FOCAL WEAKNESS: 0
BACK PAIN: 0
HEADACHES: 0
SORE THROAT: 0
COUGH: 0

## 2020-12-04 ASSESSMENT — PAIN DESCRIPTION - PAIN TYPE
TYPE: ACUTE PAIN

## 2020-12-04 ASSESSMENT — ACTIVITIES OF DAILY LIVING (ADL): TOILETING: INDEPENDENT

## 2020-12-04 NOTE — PROGRESS NOTES
[facility] Medicine Daily Progress Note    Date of Service  12/4/2020    Chief Complaint  75 y.o. male admitted 12/2/2020 with ground-level fall, nausea, vomiting and diarrhea    Hospital Course  Mr. Vega is a 75-year-old male who presented to the emergency department on 12/2/2020 with complaints of fall.  He is unsure if he lost consciousness or not.  This was accompanied by nausea and vomiting x4 days which has subsequently caused poor p.o. intake and severe dehydration.  CT of the head done on admission showed no acute intracranial abnormalities.  A chest x-ray was performed and was significant only for bronchial inflammation indicating bronchitis.  A CT of the abdomen and pelvis without contrast was also performed and revealed fluid-filled distended colonic loops suggesting ileus and/or enteritis.  He was noted to be hypotensive while in the ED and was additionally found to have sepsis in addition to acute kidney injury.  He was subsequently started on IV fluid resuscitation and admitted to the hospital for further evaluation and treatment    Interval Problem Update  Reports ongoing nausea and watery diarrhea.  Check stool for C. difficile.  Denies any vomiting.  Patient is able to tolerate full liquid diet.  He denies any fevers or chills.  Potassium is low at 3 will replace orally.  Creatinine has improved to 2.21.  PT OT eval    Consultants/Specialty  None    Code Status  Full Code    Disposition  To be determined    Review of Systems  Review of Systems   Constitutional: Positive for malaise/fatigue. Negative for chills, diaphoresis and fever.   HENT: Negative for hearing loss and sore throat.    Eyes: Negative for blurred vision.   Respiratory: Negative for cough, sputum production, shortness of breath and wheezing.    Cardiovascular: Negative for chest pain, palpitations and leg swelling.   Gastrointestinal: Positive for abdominal pain, diarrhea and nausea. Negative for blood in stool and vomiting.    Genitourinary: Negative for dysuria, flank pain and urgency.   Musculoskeletal: Positive for falls. Negative for back pain, joint pain, myalgias and neck pain.   Skin: Negative for rash.   Neurological: Positive for weakness. Negative for dizziness, focal weakness, seizures and headaches.   Endo/Heme/Allergies: Does not bruise/bleed easily.   Psychiatric/Behavioral: Negative for suicidal ideas.   All other systems reviewed and are negative.       Physical Exam  Temp:  [36.1 °C (96.9 °F)-36.7 °C (98 °F)] 36.7 °C (98 °F)  Pulse:  [] 97  Resp:  [11-18] 16  BP: ()/(58-76) 119/76  SpO2:  [96 %-98 %] 96 %    Physical Exam  Vitals signs and nursing note reviewed.   Constitutional:       General: He is not in acute distress.     Appearance: Normal appearance.   HENT:      Head: Normocephalic and atraumatic.      Nose: Nose normal.      Mouth/Throat:      Mouth: Mucous membranes are moist.   Eyes:      Extraocular Movements: Extraocular movements intact.      Conjunctiva/sclera: Conjunctivae normal.      Pupils: Pupils are equal, round, and reactive to light.   Neck:      Musculoskeletal: Normal range of motion and neck supple.   Cardiovascular:      Rate and Rhythm: Normal rate and regular rhythm.      Pulses: Normal pulses.      Heart sounds: Normal heart sounds.   Pulmonary:      Effort: Pulmonary effort is normal. No respiratory distress.      Breath sounds: Normal breath sounds. No wheezing, rhonchi or rales.   Abdominal:      General: Bowel sounds are normal. There is no distension.      Palpations: Abdomen is soft.      Tenderness: There is no abdominal tenderness.   Musculoskeletal: Normal range of motion.         General: No swelling or tenderness.   Lymphadenopathy:      Cervical: No cervical adenopathy.   Skin:     General: Skin is warm.      Coloration: Skin is not jaundiced.      Findings: No rash.   Neurological:      General: No focal deficit present.      Mental Status: He is alert and oriented  to person, place, and time.      Cranial Nerves: No cranial nerve deficit.      Motor: No weakness.   Psychiatric:         Mood and Affect: Mood normal.         Behavior: Behavior normal.         Fluids    Intake/Output Summary (Last 24 hours) at 12/4/2020 1148  Last data filed at 12/4/2020 0615  Gross per 24 hour   Intake 4000 ml   Output 400 ml   Net 3600 ml       Laboratory  Recent Labs     12/03/20  0045 12/03/20  1611 12/04/20  0058   WBC 14.9* 10.1 10.0   RBC 6.28* 6.49* 5.17   HEMOGLOBIN 18.8* 19.0* 15.6   HEMATOCRIT 57.0* 57.2* 45.3   MCV 90.8 88.1 87.6   MCH 29.9 29.3 30.2   MCHC 33.0* 33.2* 34.4   RDW 46.6 44.5 44.5   PLATELETCT 304 215 225   MPV 12.3 11.9 11.8     Recent Labs     12/03/20  0836 12/03/20  1611 12/04/20  0058   SODIUM 139 139 140   POTASSIUM 3.7 3.9 3.0*   CHLORIDE 98 103 106   CO2 21 20 21   GLUCOSE 259* 249* 252*   * 104* 97*   CREATININE 2.82* 2.87* 2.21*   CALCIUM 8.9 8.8 8.3*     Recent Labs     12/03/20  0045 12/03/20  0401   APTT 29.4  --    INR 1.18* 1.21*               Imaging  US-RENAL   Final Result      No hydronephrosis is identified      CT-ABDOMEN-PELVIS W/O   Final Result         1.  Fluid-filled distended colonic loops suggests ileus and/or enteritis.   2.  Fat-containing bilateral inguinal hernias   3.  Atherosclerosis and atherosclerotic coronary artery disease.      DX-CHEST-PORTABLE (1 VIEW)   Final Result         1.  No focal infiltrates.   2.  Perihilar interstitial prominence and bronchial wall cuffing, appearance suggests changes of underlying bronchial inflammation, consider bronchitis.      CT-HEAD W/O   Final Result         1.  No acute intracranial abnormality is identified, there are nonspecific white matter changes, commonly associated with small vessel ischemic disease.  Associated mild cerebral atrophy is noted.   2.  Atherosclerosis.           Assessment/Plan  * Sepsis (HCC)- (present on admission)  Assessment & Plan  This is Severe Sepsis Present on  admission  SIRS criteria identified on my evaluation include: Tachycardia, with heart rate greater than 90 BPM, Tachypnea, with respirations greater than 20 per minute and Leukocytosis, with WBC greater than 12,000  Source of infection is suspected intra-abdominal  Clinical indicators of end organ dysfunction include Creatinine >2.0 (Without ESRD or CKD)  Sepsis protocol initiated  Fluid resuscitation ordered per protocol  IV antibiotics as appropriate for source of sepsis  Reassessment: I have reassessed the patient's hemodynamic status  End organ dysfunction include(s):  Acute kidney failure      Enteritis- (present on admission)  Assessment & Plan  Continue with fluid resuscitation, analgesics for pain, n.p.o. sips with meds and slow transition to clear liquid diet if tolerated.    Continue IV Levaquin and IV Flagyl  Check stool for C. difficile    ADRIENNE (acute kidney injury) ([facility])- (present on admission)  Assessment & Plan  Urinalysis ordered and pending  Likely secondary to sepsis compounded with hypotension leading to poor renal perfusion.  Monitor urine output and consider bladder scan if an uric/oliguric    Hypokalemia  Assessment & Plan  Check mag  Replace with p.o. potassium  Monitor BMP    NSTEMI (non-ST elevated myocardial infarction) (HCC)- (present on admission)  Assessment & Plan  Likely NSTEMI type II from demand ischemia  Trend troponins  Twelve-lead EKG ordered and pending  No chest pain on physical examination and likely component of ADRIENNE       VTE prophylaxis: Heparin

## 2020-12-04 NOTE — PROGRESS NOTES
Assumed care at 1900. Received report from day shift RN. Pt is awake and alert in bed, A&Ox 4, on RA, reports a pain level of 3/10. Fall precautions and appropriate signs in place. Call light and belongings within reach. Hourly rounding in place. Communication board updated. Pt denies any additional needs at this time.

## 2020-12-04 NOTE — CARE PLAN
Problem: Communication  Goal: The ability to communicate needs accurately and effectively will improve  Outcome: PROGRESSING AS EXPECTED  Note: Patient effectively communicates using the electronic call light system. Hourly rounding is in place.      Problem: Safety  Goal: Will remain free from injury  Outcome: PROGRESSING AS EXPECTED  Note: Patient is in bed with 3 rails placed up and bed alarm used. Patient belongings and call light are within reach. Nonslip socks are worn and bed is at the lowest position.

## 2020-12-04 NOTE — CARE PLAN
Patient is safely transferring on and off commode, using the call light, C-Diff sample sent to lab for assay d/t recurrent loose stools.  Problem: Safety  Goal: Will remain free from injury  Outcome: PROGRESSING AS EXPECTED     Problem: Infection  Goal: Will remain free from infection  Outcome: PROGRESSING SLOWER THAN EXPECTED

## 2020-12-04 NOTE — CARE PLAN
Problem: Bowel/Gastric:  Goal: Normal bowel function is maintained or improved  Outcome: PROGRESSING AS EXPECTED  Note: Pt calling appropriately to alert staff to bowel needs. Pt ambulating to bedside commode for BMs.     Problem: Skin Integrity  Goal: Risk for impaired skin integrity will decrease  Outcome: PROGRESSING AS EXPECTED  Note: Pt turns self in bed frequently, has moisturizer at bedside, and gets up to the bedside commode.

## 2020-12-04 NOTE — PROGRESS NOTES
Assumed care of patient at 1550 from Brian Avalos RN. Patient is A&O x 4, states pain level is 5/10. Bed locked in lowest position with 3 rails up. Call light in place, belongings at bedside. Patient expresses no needs at this time and hourly rounding is in place.

## 2020-12-04 NOTE — THERAPY
"Occupational Therapy   Initial Evaluation     Patient Name: Lakhwinder Vega  Age:  75 y.o., Sex:  male  Medical Record #: [id number]  Today's Date: 12/4/2020     Precautions  Precautions: Fall Risk    Assessment  Patient is 75 y.o. male admitted after GLF and N/V/D x4 days, found to have severe dehydration, bronchitis, sepsis w/ ADRIENNE, NSTEMI type II from demand ischemia, and enteritis. Reports lives alone in a 2 story apt, and has no one to assist. Req min A for txfs and v/cs for safety with FWW. Limited functional ambulation as fatigues quickly; to BSC and around bed. Currently limited by decreased functional mobility, activity tolerance, cognition, strength, balance, and pain which are currently affecting pt's ability to complete ADLs/IADLs at baseline. Will continue to follow.     Plan    Recommend Occupational Therapy 3 times per week until therapy goals are met for the following treatments:  Adaptive Equipment, Neuro Re-Education / Balance, Self Care/Activities of Daily Living, Therapeutic Activities and Therapeutic Exercises.    DC Equipment Recommendations: Tub / Shower Seat, Front-Wheel Walker  Discharge Recommendations: Recommend post-acute placement for additional occupational therapy services prior to discharge home(May progress while in house)     Subjective    \"I don't think I'm ready to go home yet.\"     Objective     12/04/20 1549   Prior Living Situation   Prior Services Home-Independent   Housing / Facility 2 Story Apartment / Condo   Steps Into Home   (flight)   Elevator Yes  (but it is broken)   Bathroom Set up Bathtub / Shower Combination;Grab Bars   Equipment Owned Grab Bar(s) In Tub / Shower   Lives with - Patient's Self Care Capacity Alone and Unable to Care For Self   Comments Reports lives alone and has no one who can assist him. reports uses the bus for transportation and grocery shopping, but has been too weak to get food   Prior Level of ADL Function   Self Feeding Independent   Grooming / " Hygiene Independent   Bathing Independent   Dressing Independent   Toileting Independent   Prior Level of IADL Function   Medication Management Independent   Laundry Independent   Kitchen Mobility Independent   Finances Independent   Home Management Independent   Shopping Independent   Prior Level Of Mobility Independent Without Device in Community;Independent Without Device in Home   Driving / Transportation Utilizes Public Transportation   Comments reports has been having difficulty completing IADLs d/t weakness   History of Falls   History of Falls Yes   Date of Last Fall   (reason for admit)   Vitals   O2 Delivery Device None - Room Air   Pain 0 - 10 Group   Location Abdomen   Location Orientation Mid   Therapist Pain Assessment Post Activity Pain Same as Prior to Activity;During Activity;Nurse Notified  (not quantified)   Cognition    Cognition / Consciousness X   Level of Consciousness Alert   Attention Impaired   Comments Pleasant and cooperative, tangential req redirection   Passive ROM Upper Body   Passive ROM Upper Body WDL   Active ROM Upper Body   Active ROM Upper Body  WDL   Strength Upper Body   Upper Body Strength  X   Gross Strength Generalized Weakness, Equal Bilaterally.    Coordination Upper Body   Coordination WDL   Balance Assessment   Sitting Balance (Static) Fair +   Sitting Balance (Dynamic) Fair   Standing Balance (Static) Fair   Standing Balance (Dynamic) Fair -   Weight Shift Sitting Good   Weight Shift Standing Fair   Comments w/ FWW   Bed Mobility    Supine to Sit Supervised   Sit to Supine Supervised   Scooting Supervised   Comments req extra time and heavy use of bed rails and HOB elevated   ADL Assessment   Eating Supervision   Grooming   (declined d/t fatigue)   Lower Body Dressing Supervision  (socks)   Toileting Moderate Assist  (pericare)   Functional Mobility   Sit to Stand Minimal Assist   Bed, Chair, Wheelchair Transfer Minimal Assist   Toilet Transfers Minimal  Assist  (elevated BSC txf)   Transfer Method Stand Step   Mobility w/FWW; EOB>elevated BSC>around bed>BTB. limited by fatigue   Comments v/cs throughout for safety/technique with FWW   Edema / Skin Assessment   Edema / Skin  Not Assessed   Activity Tolerance   Sitting in Chair 2 min on BSC   Sitting Edge of Bed 20 min   Standing 5 min total   Comments req 2 seated RBs   Patient / Family Goals   Patient / Family Goal #1 to go home   Short Term Goals   Short Term Goal # 1 will complete toilet txf with SPV   Short Term Goal # 2 will complete standing g/h with SPV   Short Term Goal # 3 will complete functional ambulation to BR with SPV and no v/cs for safety   Anticipated Discharge Equipment and Recommendations   DC Equipment Recommendations Tub / Shower Seat;Front-Wheel Walker   Discharge Recommendations Recommend post-acute placement for additional occupational therapy services prior to discharge home  (May progress while in house)

## 2020-12-04 NOTE — PROGRESS NOTES
· 2 RN skin check complete.   · Devices in place N/A.  · Skin assessed under devices N/A.  · Confirmed pressure ulcers found on N/A.  · New potential pressure ulcers noted on N/A. Wound consult placed? N/A. Photo uploaded? N/A.   · The following interventions are in place, pillows for repositioning and comfort.     There was blanching redness noted on the ears and sacrum. Additionally there was some redness and bruising noted on the anterior forearm of the right arm. On the lateral dorsal feet bilaterally there was some circular discoloration (photos taken and documented).

## 2020-12-05 LAB
ANION GAP SERPL CALC-SCNC: 8 MMOL/L (ref 7–16)
ANISOCYTOSIS BLD QL SMEAR: ABNORMAL
BASOPHILS # BLD AUTO: 0 % (ref 0–1.8)
BASOPHILS # BLD: 0 K/UL (ref 0–0.12)
BUN SERPL-MCNC: 67 MG/DL (ref 8–22)
BURR CELLS BLD QL SMEAR: NORMAL
CALCIUM SERPL-MCNC: 8.2 MG/DL (ref 8.5–10.5)
CHLORIDE SERPL-SCNC: 109 MMOL/L (ref 96–112)
CO2 SERPL-SCNC: 25 MMOL/L (ref 20–33)
CREAT SERPL-MCNC: 1.69 MG/DL (ref 0.5–1.4)
EOSINOPHIL # BLD AUTO: 0 K/UL (ref 0–0.51)
EOSINOPHIL NFR BLD: 0 % (ref 0–6.9)
ERYTHROCYTE [DISTWIDTH] IN BLOOD BY AUTOMATED COUNT: 44.8 FL (ref 35.9–50)
GLUCOSE BLD-MCNC: 189 MG/DL (ref 65–99)
GLUCOSE BLD-MCNC: 209 MG/DL (ref 65–99)
GLUCOSE BLD-MCNC: 269 MG/DL (ref 65–99)
GLUCOSE SERPL-MCNC: 167 MG/DL (ref 65–99)
HCT VFR BLD AUTO: 44 % (ref 42–52)
HGB BLD-MCNC: 14.9 G/DL (ref 14–18)
LYMPHOCYTES # BLD AUTO: 0.55 K/UL (ref 1–4.8)
LYMPHOCYTES NFR BLD: 5.2 % (ref 22–41)
MACROCYTES BLD QL SMEAR: ABNORMAL
MANUAL DIFF BLD: NORMAL
MCH RBC QN AUTO: 30 PG (ref 27–33)
MCHC RBC AUTO-ENTMCNC: 33.9 G/DL (ref 33.7–35.3)
MCV RBC AUTO: 88.5 FL (ref 81.4–97.8)
MICROCYTES BLD QL SMEAR: ABNORMAL
MONOCYTES # BLD AUTO: 1.2 K/UL (ref 0–0.85)
MONOCYTES NFR BLD AUTO: 11.3 % (ref 0–13.4)
MORPHOLOGY BLD-IMP: NORMAL
NEUTROPHILS # BLD AUTO: 8.85 K/UL (ref 1.82–7.42)
NEUTROPHILS NFR BLD: 82.6 % (ref 44–72)
NEUTS BAND NFR BLD MANUAL: 0.9 % (ref 0–10)
NRBC # BLD AUTO: 0 K/UL
NRBC BLD-RTO: 0 /100 WBC
OVALOCYTES BLD QL SMEAR: NORMAL
PLATELET # BLD AUTO: 192 K/UL (ref 164–446)
PLATELET BLD QL SMEAR: NORMAL
PMV BLD AUTO: 12 FL (ref 9–12.9)
POTASSIUM SERPL-SCNC: 3.3 MMOL/L (ref 3.6–5.5)
RBC # BLD AUTO: 4.97 M/UL (ref 4.7–6.1)
RBC BLD AUTO: PRESENT
SODIUM SERPL-SCNC: 142 MMOL/L (ref 135–145)
WBC # BLD AUTO: 10.6 K/UL (ref 4.8–10.8)

## 2020-12-05 PROCEDURE — 770006 HCHG ROOM/CARE - MED/SURG/GYN SEMI*

## 2020-12-05 PROCEDURE — 82962 GLUCOSE BLOOD TEST: CPT

## 2020-12-05 PROCEDURE — A9270 NON-COVERED ITEM OR SERVICE: HCPCS | Performed by: INTERNAL MEDICINE

## 2020-12-05 PROCEDURE — 85007 BL SMEAR W/DIFF WBC COUNT: CPT

## 2020-12-05 PROCEDURE — 700102 HCHG RX REV CODE 250 W/ 637 OVERRIDE(OP): Performed by: INTERNAL MEDICINE

## 2020-12-05 PROCEDURE — 80048 BASIC METABOLIC PNL TOTAL CA: CPT

## 2020-12-05 PROCEDURE — A9270 NON-COVERED ITEM OR SERVICE: HCPCS | Performed by: STUDENT IN AN ORGANIZED HEALTH CARE EDUCATION/TRAINING PROGRAM

## 2020-12-05 PROCEDURE — 97162 PT EVAL MOD COMPLEX 30 MIN: CPT

## 2020-12-05 PROCEDURE — 36415 COLL VENOUS BLD VENIPUNCTURE: CPT

## 2020-12-05 PROCEDURE — 700105 HCHG RX REV CODE 258: Performed by: NURSE PRACTITIONER

## 2020-12-05 PROCEDURE — 99232 SBSQ HOSP IP/OBS MODERATE 35: CPT | Performed by: INTERNAL MEDICINE

## 2020-12-05 PROCEDURE — 700102 HCHG RX REV CODE 250 W/ 637 OVERRIDE(OP): Performed by: STUDENT IN AN ORGANIZED HEALTH CARE EDUCATION/TRAINING PROGRAM

## 2020-12-05 PROCEDURE — 85027 COMPLETE CBC AUTOMATED: CPT

## 2020-12-05 RX ORDER — LOPERAMIDE HYDROCHLORIDE 2 MG/1
4 CAPSULE ORAL 4 TIMES DAILY PRN
Status: DISCONTINUED | OUTPATIENT
Start: 2020-12-05 | End: 2020-12-10 | Stop reason: HOSPADM

## 2020-12-05 RX ORDER — LORAZEPAM 2 MG/ML
1 INJECTION INTRAMUSCULAR EVERY 4 HOURS PRN
Status: DISCONTINUED | OUTPATIENT
Start: 2020-12-05 | End: 2020-12-07

## 2020-12-05 RX ADMIN — SODIUM CHLORIDE, POTASSIUM CHLORIDE, SODIUM LACTATE AND CALCIUM CHLORIDE: 600; 310; 30; 20 INJECTION, SOLUTION INTRAVENOUS at 22:50

## 2020-12-05 RX ADMIN — LOPERAMIDE HYDROCHLORIDE 4 MG: 2 CAPSULE ORAL at 18:50

## 2020-12-05 RX ADMIN — INSULIN HUMAN 2 UNITS: 100 INJECTION, SOLUTION PARENTERAL at 17:00

## 2020-12-05 RX ADMIN — SODIUM CHLORIDE, POTASSIUM CHLORIDE, SODIUM LACTATE AND CALCIUM CHLORIDE: 600; 310; 30; 20 INJECTION, SOLUTION INTRAVENOUS at 10:09

## 2020-12-05 RX ADMIN — SODIUM CHLORIDE, POTASSIUM CHLORIDE, SODIUM LACTATE AND CALCIUM CHLORIDE: 600; 310; 30; 20 INJECTION, SOLUTION INTRAVENOUS at 03:11

## 2020-12-05 RX ADMIN — APIXABAN 5 MG: 5 TABLET, FILM COATED ORAL at 17:00

## 2020-12-05 RX ADMIN — INSULIN HUMAN 3 UNITS: 100 INJECTION, SOLUTION PARENTERAL at 12:52

## 2020-12-05 RX ADMIN — INSULIN HUMAN 1 UNITS: 100 INJECTION, SOLUTION PARENTERAL at 21:52

## 2020-12-05 RX ADMIN — APIXABAN 5 MG: 5 TABLET, FILM COATED ORAL at 05:38

## 2020-12-05 RX ADMIN — Medication 1000 UNITS: at 05:38

## 2020-12-05 RX ADMIN — LOPERAMIDE HYDROCHLORIDE 4 MG: 2 CAPSULE ORAL at 08:57

## 2020-12-05 RX ADMIN — INSULIN HUMAN 1 UNITS: 100 INJECTION, SOLUTION PARENTERAL at 09:00

## 2020-12-05 ASSESSMENT — ENCOUNTER SYMPTOMS
ABDOMINAL PAIN: 1
FALLS: 1
BACK PAIN: 0
NAUSEA: 1
BLURRED VISION: 0
DIARRHEA: 1
BLOOD IN STOOL: 0
SORE THROAT: 0
MYALGIAS: 0
FLANK PAIN: 0
WHEEZING: 0
COUGH: 0
HEADACHES: 0
SHORTNESS OF BREATH: 0
DIAPHORESIS: 0
VOMITING: 0
PALPITATIONS: 0
NECK PAIN: 0
DIZZINESS: 0
BRUISES/BLEEDS EASILY: 0
FEVER: 0
SEIZURES: 0
SPUTUM PRODUCTION: 0
FOCAL WEAKNESS: 0
CHILLS: 0
WEAKNESS: 1

## 2020-12-05 ASSESSMENT — COGNITIVE AND FUNCTIONAL STATUS - GENERAL
CLIMB 3 TO 5 STEPS WITH RAILING: A LOT
STANDING UP FROM CHAIR USING ARMS: A LITTLE
SUGGESTED CMS G CODE MODIFIER MOBILITY: CK
MOVING TO AND FROM BED TO CHAIR: A LITTLE
WALKING IN HOSPITAL ROOM: A LITTLE
MOVING FROM LYING ON BACK TO SITTING ON SIDE OF FLAT BED: A LITTLE
MOBILITY SCORE: 18

## 2020-12-05 ASSESSMENT — GAIT ASSESSMENTS
ASSISTIVE DEVICE: FRONT WHEEL WALKER
DISTANCE (FEET): 10

## 2020-12-05 ASSESSMENT — PAIN DESCRIPTION - PAIN TYPE
TYPE: ACUTE PAIN
TYPE: ACUTE PAIN

## 2020-12-05 ASSESSMENT — FIBROSIS 4 INDEX: FIB4 SCORE: 1.93

## 2020-12-05 NOTE — PROGRESS NOTES
Hospital Medicine Daily Progress Note    Date of Service  12/5/2020    Chief Complaint  75 y.o. male admitted 12/2/2020 with ground-level fall, nausea, vomiting and diarrhea    Hospital Course  Mr. Vega is a 75-year-old male who presented to the emergency department on 12/2/2020 with complaints of fall.  He is unsure if he lost consciousness or not.  This was accompanied by nausea and vomiting x4 days which has subsequently caused poor p.o. intake and severe dehydration.  CT of the head done on admission showed no acute intracranial abnormalities.  A chest x-ray was performed and was significant only for bronchial inflammation indicating bronchitis.  A CT of the abdomen and pelvis without contrast was also performed and revealed fluid-filled distended colonic loops suggesting ileus and/or enteritis.  He was noted to be hypotensive while in the ED and was additionally found to have sepsis in addition to acute kidney injury.  He was subsequently started on IV fluid resuscitation and admitted to the hospital for further evaluation and treatment    Interval Problem Update  Reports ongoing nausea and watery diarrhea.  Check stool for C. difficile.  Denies any vomiting.  Patient is able to tolerate full liquid diet.  He denies any fevers or chills.  Potassium is low at 3 will replace orally.  Creatinine has improved to 2.21.  PT OT eval    12/5 continues to report nausea, vomiting and diarrhea. His abdomen is soft and nontender. No fevers or chills. Continue IV fluids. ADRIENNE is improving.     Consultants/Specialty  None    Code Status  Full Code    Disposition  PT OT eval and likely home with home health    Review of Systems  Review of Systems   Constitutional: Positive for malaise/fatigue. Negative for chills, diaphoresis and fever.   HENT: Negative for hearing loss and sore throat.    Eyes: Negative for blurred vision.   Respiratory: Negative for cough, sputum production, shortness of breath and wheezing.     Cardiovascular: Negative for chest pain, palpitations and leg swelling.   Gastrointestinal: Positive for abdominal pain, diarrhea and nausea. Negative for blood in stool and vomiting.   Genitourinary: Negative for dysuria, flank pain and urgency.   Musculoskeletal: Positive for falls. Negative for back pain, joint pain, myalgias and neck pain.   Skin: Negative for rash.   Neurological: Positive for weakness. Negative for dizziness, focal weakness, seizures and headaches.   Endo/Heme/Allergies: Does not bruise/bleed easily.   Psychiatric/Behavioral: Negative for suicidal ideas.   All other systems reviewed and are negative.       Physical Exam  Temp:  [36.1 °C (97 °F)-36.7 °C (98 °F)] 36.1 °C (97 °F)  Pulse:  [] 109  Resp:  [16-17] 16  BP: ()/(75-87) 138/87  SpO2:  [93 %-96 %] 93 %    Physical Exam  Vitals signs and nursing note reviewed.   Constitutional:       General: He is not in acute distress.     Appearance: Normal appearance.   HENT:      Head: Normocephalic and atraumatic.      Nose: Nose normal.      Mouth/Throat:      Mouth: Mucous membranes are moist.   Eyes:      Extraocular Movements: Extraocular movements intact.      Conjunctiva/sclera: Conjunctivae normal.      Pupils: Pupils are equal, round, and reactive to light.   Neck:      Musculoskeletal: Normal range of motion and neck supple.   Cardiovascular:      Rate and Rhythm: Normal rate and regular rhythm.      Pulses: Normal pulses.      Heart sounds: Normal heart sounds.   Pulmonary:      Effort: Pulmonary effort is normal. No respiratory distress.      Breath sounds: Normal breath sounds. No wheezing, rhonchi or rales.   Abdominal:      General: Bowel sounds are normal. There is no distension.      Palpations: Abdomen is soft.      Tenderness: There is no abdominal tenderness.   Musculoskeletal: Normal range of motion.         General: No swelling or tenderness.   Lymphadenopathy:      Cervical: No cervical adenopathy.   Skin:      General: Skin is warm.      Coloration: Skin is not jaundiced.      Findings: No rash.   Neurological:      General: No focal deficit present.      Mental Status: He is alert and oriented to person, place, and time.      Cranial Nerves: No cranial nerve deficit.      Motor: No weakness.   Psychiatric:         Mood and Affect: Mood normal.         Behavior: Behavior normal.         Fluids    Intake/Output Summary (Last 24 hours) at 12/5/2020 0841  Last data filed at 12/5/2020 0701  Gross per 24 hour   Intake 1920 ml   Output 350 ml   Net 1570 ml       Laboratory  Recent Labs     12/03/20  1611 12/04/20  0058 12/05/20  0127   WBC 10.1 10.0 10.6   RBC 6.49* 5.17 4.97   HEMOGLOBIN 19.0* 15.6 14.9   HEMATOCRIT 57.2* 45.3 44.0   MCV 88.1 87.6 88.5   MCH 29.3 30.2 30.0   MCHC 33.2* 34.4 33.9   RDW 44.5 44.5 44.8   PLATELETCT 215 225 192   MPV 11.9 11.8 12.0     Recent Labs     12/03/20  1611 12/04/20  0058 12/05/20  0127   SODIUM 139 140 142   POTASSIUM 3.9 3.0* 3.3*   CHLORIDE 103 106 109   CO2 20 21 25   GLUCOSE 249* 252* 167*   * 97* 67*   CREATININE 2.87* 2.21* 1.69*   CALCIUM 8.8 8.3* 8.2*     Recent Labs     12/03/20  0045 12/03/20  0401   APTT 29.4  --    INR 1.18* 1.21*               Imaging  US-RENAL   Final Result      No hydronephrosis is identified      CT-ABDOMEN-PELVIS W/O   Final Result         1.  Fluid-filled distended colonic loops suggests ileus and/or enteritis.   2.  Fat-containing bilateral inguinal hernias   3.  Atherosclerosis and atherosclerotic coronary artery disease.      DX-CHEST-PORTABLE (1 VIEW)   Final Result         1.  No focal infiltrates.   2.  Perihilar interstitial prominence and bronchial wall cuffing, appearance suggests changes of underlying bronchial inflammation, consider bronchitis.      CT-HEAD W/O   Final Result         1.  No acute intracranial abnormality is identified, there are nonspecific white matter changes, commonly associated with small vessel ischemic disease.   Associated mild cerebral atrophy is noted.   2.  Atherosclerosis.           Assessment/Plan  * Sepsis ([facility])- (present on admission)  Assessment & Plan  This is Severe Sepsis Present on admission  SIRS criteria identified on my evaluation include: Tachycardia, with heart rate greater than 90 BPM, Tachypnea, with respirations greater than 20 per minute and Leukocytosis, with WBC greater than 12,000  Source of infection is suspected intra-abdominal  Clinical indicators of end organ dysfunction include Creatinine >2.0 (Without ESRD or CKD)  Sepsis protocol initiated  Fluid resuscitation ordered per protocol  IV antibiotics as appropriate for source of sepsis  Reassessment: I have reassessed the patient's hemodynamic status  End organ dysfunction include(s):  Acute kidney failure      Enteritis- (present on admission)  Assessment & Plan  Continue with IV fluid resuscitation, analgesics for pain, n.p.o. sips with meds and slow transition to clear liquid diet if tolerated.    C diff negative    ADRIENNE (acute kidney injury) ([facility])- (present on admission)  Assessment & Plan  Urinalysis ordered and pending  Likely secondary to sepsis compounded with hypotension leading to poor renal perfusion.  Monitor urine output and consider bladder scan if an uric/oliguric    Hypokalemia  Assessment & Plan  Check mag  Replace with p.o. potassium  Monitor BMP    NSTEMI (non-ST elevated myocardial infarction) ([facility])- (present on admission)  Assessment & Plan  Likely NSTEMI type II from demand ischemia  Trend troponins  Twelve-lead EKG ordered and pending  No chest pain on physical examination and likely component of ADRIENNE       VTE prophylaxis: Heparin

## 2020-12-05 NOTE — CARE PLAN
Patient using call light system appropriately and transferring to commode and chair safely  Problem: Communication  Goal: The ability to communicate needs accurately and effectively will improve  Outcome: PROGRESSING AS EXPECTED     Problem: Safety  Goal: Will remain free from injury  Outcome: PROGRESSING AS EXPECTED

## 2020-12-05 NOTE — PROGRESS NOTES
"Assumed care at 0700 following night nurse report, assessment completed. Patient is A&O X4. Patient complains of nausea and complains of abdominal discomfort from frequent diarrhea, Imodium ordered. Fall precautions and appropriate signs in place. Call light/phone system and RN extension updated on whiteboard. Bed alarm not in use, patient is one person stand-by assist with transfers on and off commode. Patient denies any additional needs at this time, Hourly rounding place.  /87   Pulse (!) 109 Comment: Rn noticed   Temp 36.1 °C (97 °F) (Temporal)   Resp 16   Ht 1.727 m (5' 8\")   Wt 77.3 kg (170 lb 6.7 oz)   SpO2 93%   BMI 25.91 kg/m²     "

## 2020-12-05 NOTE — PROGRESS NOTES
"Patient sat up in chair for breakfast and lunch, 30 minutes each. Following each sitting event he felt \"tired and increased nausea\". Patient returned to bed to relax and nap but committed to repeating the exercise each meal.   "

## 2020-12-05 NOTE — CARE PLAN
Problem: Knowledge Deficit  Goal: Knowledge of disease process/condition, treatment plan, diagnostic tests, and medications will improve  Outcome: PROGRESSING AS EXPECTED  Note: Discharge information discussed with the pt. The pt communicated they feel unsafe at home by themselves, the RN discussed alternative options with the pt. Pt is agreeable and interested in seeking alternative discharge options.      Problem: Bowel/Gastric:  Goal: Normal bowel function is maintained or improved  Outcome: PROGRESSING SLOWER THAN EXPECTED  Note: Pt experiencing frequent episodes of loose stool. Pt denies abdominal pain related to bowels.

## 2020-12-05 NOTE — PROGRESS NOTES
Stool sample collected and sent to lab at 1200 due to repeated loose stool. Awaiting results, if negative orders to be added for Imodium 4mg PO Q6hr PRN.

## 2020-12-06 LAB
ANION GAP SERPL CALC-SCNC: 6 MMOL/L (ref 7–16)
BASOPHILS # BLD AUTO: 0 % (ref 0–1.8)
BASOPHILS # BLD: 0 K/UL (ref 0–0.12)
BUN SERPL-MCNC: 40 MG/DL (ref 8–22)
BURR CELLS BLD QL SMEAR: NORMAL
CALCIUM SERPL-MCNC: 7.8 MG/DL (ref 8.5–10.5)
CHLORIDE SERPL-SCNC: 112 MMOL/L (ref 96–112)
CO2 SERPL-SCNC: 26 MMOL/L (ref 20–33)
CREAT SERPL-MCNC: 1.2 MG/DL (ref 0.5–1.4)
EOSINOPHIL # BLD AUTO: 0 K/UL (ref 0–0.51)
EOSINOPHIL NFR BLD: 0 % (ref 0–6.9)
ERYTHROCYTE [DISTWIDTH] IN BLOOD BY AUTOMATED COUNT: 44.4 FL (ref 35.9–50)
GLUCOSE BLD-MCNC: 160 MG/DL (ref 65–99)
GLUCOSE BLD-MCNC: 174 MG/DL (ref 65–99)
GLUCOSE BLD-MCNC: 177 MG/DL (ref 65–99)
GLUCOSE BLD-MCNC: 186 MG/DL (ref 65–99)
GLUCOSE BLD-MCNC: 188 MG/DL (ref 65–99)
GLUCOSE SERPL-MCNC: 193 MG/DL (ref 65–99)
HCT VFR BLD AUTO: 38.3 % (ref 42–52)
HGB BLD-MCNC: 13.1 G/DL (ref 14–18)
LYMPHOCYTES # BLD AUTO: 1.77 K/UL (ref 1–4.8)
LYMPHOCYTES NFR BLD: 17.5 % (ref 22–41)
MANUAL DIFF BLD: NORMAL
MCH RBC QN AUTO: 29.9 PG (ref 27–33)
MCHC RBC AUTO-ENTMCNC: 34.2 G/DL (ref 33.7–35.3)
MCV RBC AUTO: 87.4 FL (ref 81.4–97.8)
MONOCYTES # BLD AUTO: 0.89 K/UL (ref 0–0.85)
MONOCYTES NFR BLD AUTO: 8.8 % (ref 0–13.4)
MORPHOLOGY BLD-IMP: NORMAL
NEUTROPHILS # BLD AUTO: 7.44 K/UL (ref 1.82–7.42)
NEUTROPHILS NFR BLD: 73.7 % (ref 44–72)
NRBC # BLD AUTO: 0 K/UL
NRBC BLD-RTO: 0 /100 WBC
PLATELET # BLD AUTO: 172 K/UL (ref 164–446)
PLATELET BLD QL SMEAR: NORMAL
PMV BLD AUTO: 12.4 FL (ref 9–12.9)
POLYCHROMASIA BLD QL SMEAR: NORMAL
POTASSIUM SERPL-SCNC: 2.9 MMOL/L (ref 3.6–5.5)
RBC # BLD AUTO: 4.38 M/UL (ref 4.7–6.1)
RBC BLD AUTO: PRESENT
SODIUM SERPL-SCNC: 144 MMOL/L (ref 135–145)
WBC # BLD AUTO: 10.1 K/UL (ref 4.8–10.8)

## 2020-12-06 PROCEDURE — 85027 COMPLETE CBC AUTOMATED: CPT

## 2020-12-06 PROCEDURE — 700111 HCHG RX REV CODE 636 W/ 250 OVERRIDE (IP): Performed by: HOSPITALIST

## 2020-12-06 PROCEDURE — 700102 HCHG RX REV CODE 250 W/ 637 OVERRIDE(OP): Performed by: INTERNAL MEDICINE

## 2020-12-06 PROCEDURE — 99232 SBSQ HOSP IP/OBS MODERATE 35: CPT | Performed by: INTERNAL MEDICINE

## 2020-12-06 PROCEDURE — 700102 HCHG RX REV CODE 250 W/ 637 OVERRIDE(OP): Performed by: STUDENT IN AN ORGANIZED HEALTH CARE EDUCATION/TRAINING PROGRAM

## 2020-12-06 PROCEDURE — 80048 BASIC METABOLIC PNL TOTAL CA: CPT

## 2020-12-06 PROCEDURE — 770006 HCHG ROOM/CARE - MED/SURG/GYN SEMI*

## 2020-12-06 PROCEDURE — 36415 COLL VENOUS BLD VENIPUNCTURE: CPT

## 2020-12-06 PROCEDURE — 82962 GLUCOSE BLOOD TEST: CPT

## 2020-12-06 PROCEDURE — A9270 NON-COVERED ITEM OR SERVICE: HCPCS | Performed by: STUDENT IN AN ORGANIZED HEALTH CARE EDUCATION/TRAINING PROGRAM

## 2020-12-06 PROCEDURE — A9270 NON-COVERED ITEM OR SERVICE: HCPCS | Performed by: INTERNAL MEDICINE

## 2020-12-06 PROCEDURE — 85007 BL SMEAR W/DIFF WBC COUNT: CPT

## 2020-12-06 PROCEDURE — 51798 US URINE CAPACITY MEASURE: CPT

## 2020-12-06 PROCEDURE — 700101 HCHG RX REV CODE 250: Performed by: STUDENT IN AN ORGANIZED HEALTH CARE EDUCATION/TRAINING PROGRAM

## 2020-12-06 RX ORDER — POTASSIUM CHLORIDE 20 MEQ/1
40 TABLET, EXTENDED RELEASE ORAL ONCE
Status: COMPLETED | OUTPATIENT
Start: 2020-12-06 | End: 2020-12-06

## 2020-12-06 RX ADMIN — POTASSIUM CHLORIDE 40 MEQ: 1500 TABLET, EXTENDED RELEASE ORAL at 10:27

## 2020-12-06 RX ADMIN — INSULIN HUMAN 1 UNITS: 100 INJECTION, SOLUTION PARENTERAL at 14:01

## 2020-12-06 RX ADMIN — POTASSIUM CHLORIDE 40 MEQ: 1500 TABLET, EXTENDED RELEASE ORAL at 17:56

## 2020-12-06 RX ADMIN — LABETALOL HYDROCHLORIDE 10 MG: 5 INJECTION, SOLUTION INTRAVENOUS at 15:55

## 2020-12-06 RX ADMIN — LORAZEPAM 1 MG: 2 INJECTION INTRAMUSCULAR; INTRAVENOUS at 04:49

## 2020-12-06 RX ADMIN — INSULIN HUMAN 1 UNITS: 100 INJECTION, SOLUTION PARENTERAL at 20:51

## 2020-12-06 RX ADMIN — Medication 1000 UNITS: at 06:14

## 2020-12-06 RX ADMIN — APIXABAN 5 MG: 5 TABLET, FILM COATED ORAL at 06:14

## 2020-12-06 RX ADMIN — APIXABAN 5 MG: 5 TABLET, FILM COATED ORAL at 17:56

## 2020-12-06 RX ADMIN — LORAZEPAM 1 MG: 2 INJECTION INTRAMUSCULAR; INTRAVENOUS at 00:23

## 2020-12-06 RX ADMIN — INSULIN HUMAN 1 UNITS: 100 INJECTION, SOLUTION PARENTERAL at 09:09

## 2020-12-06 RX ADMIN — INSULIN HUMAN 1 UNITS: 100 INJECTION, SOLUTION PARENTERAL at 18:01

## 2020-12-06 RX ADMIN — ATORVASTATIN CALCIUM 20 MG: 20 TABLET, FILM COATED ORAL at 20:54

## 2020-12-06 RX ADMIN — LOPERAMIDE HYDROCHLORIDE 4 MG: 2 CAPSULE ORAL at 04:01

## 2020-12-06 ASSESSMENT — ENCOUNTER SYMPTOMS
CHILLS: 0
WHEEZING: 0
PALPITATIONS: 0
SEIZURES: 0
WEAKNESS: 1
DIARRHEA: 1
BLOOD IN STOOL: 0
SORE THROAT: 0
MYALGIAS: 0
SPUTUM PRODUCTION: 0
DIZZINESS: 0
BLURRED VISION: 0
BACK PAIN: 0
DIAPHORESIS: 0
HEADACHES: 0
BRUISES/BLEEDS EASILY: 0
ABDOMINAL PAIN: 1
NAUSEA: 1
FEVER: 0
SHORTNESS OF BREATH: 0
COUGH: 0
NECK PAIN: 0
FLANK PAIN: 0
VOMITING: 0
FOCAL WEAKNESS: 0
FALLS: 1

## 2020-12-06 ASSESSMENT — PAIN DESCRIPTION - PAIN TYPE: TYPE: ACUTE PAIN

## 2020-12-06 NOTE — PROGRESS NOTES
Report received from dayshift RN. Pt nauseated with small amount of emesis. Paged hospitalist for anti-emetic. Denies pain. PIV dressing changed per protocol

## 2020-12-06 NOTE — THERAPY
"Physical Therapy   Initial Evaluation     Patient Name: Lakhwinder Vega  Age:  75 y.o., Sex:  male  Medical Record #: [id number]  Today's Date: 12/5/2020     Precautions: Fall Risk    Assessment  Patient is 75 y.o. male admitted after sustaining at [facility]; found to have sepsis presenting to PT most limited by fatigue. He was able to manage himself during bed mobility and perform transfers with FWW at spv. Overall gait distance was limited to x10' with FWW at SBA for safety due to fatigue. Anticipate pt to progress such that he may return home, however, at his current level, recommend placement given that he must manage a flight of stairs.       Plan    Recommend Physical Therapy 3 times per week until therapy goals are met for the following treatments:  Equipment, Gait Training, Neuro Re-Education / Balance, Self Care/Home Evaluation, Sensory Integration Techniques, Stair Training, Therapeutic Activities and Therapeutic Exercises    DC Equipment Recommendations: Unable to determine at this time  Discharge Recommendations: Recommend post-acute placement for additional physical therapy services prior to discharge home(may progress such that he can return home)        Objective       12/05/20 1001   Prior Living Situation   Prior Services None   Housing / Facility 2 Story Apartment / Condo   Steps Into Home   (flight)   Equipment Owned None   Lives with - Patient's Self Care Capacity Alone and Unable to Care For Self   Prior Level of Functional Mobility   Bed Mobility Independent   Transfer Status Independent   Ambulation Independent   Distance Ambulation (Feet)   (community)   Assistive Devices Used Unable to Determine At This Time   Balance Assessment   Comments Seated at spv; standing at SBA with FWW   Gait Analysis   Gait Level Of Assist   (Stand By Assist for safety)   Assistive Device Front Wheel Walker   Distance (Feet) 10   Comments distance limited by patient due to fatigue, \"maybe I'll walk more later\"   Bed " Mobility    Supine to Sit Supervised   Scooting Supervised   Rolling Minimal Assist to Rt.   Functional Mobility   Sit to Stand Supervised   Bed, Chair, Wheelchair Transfer Supervised   Transfer Method Stand Step   Short Term Goals    Short Term Goal # 1 Pt will perform gait x150' with FWW at spvin 6tx to improve functional independence.    Short Term Goal # 2 Pt will ascend/descend x10 stairs at spv in 6tx to improve ability to enter/leave his home.    Anticipated Discharge Equipment and Recommendations   DC Equipment Recommendations Unable to determine at this time   Discharge Recommendations Recommend post-acute placement for additional physical therapy services prior to discharge home  (may progress such that he can return home)

## 2020-12-06 NOTE — PROGRESS NOTES
Hospital Medicine Daily Progress Note    Date of Service  12/6/2020    Chief Complaint  75 y.o. male admitted 12/2/2020 with ground-level fall, nausea, vomiting and diarrhea    Hospital Course  Mr. Vega is a 75-year-old male who presented to the emergency department on 12/2/2020 with complaints of fall.  He is unsure if he lost consciousness or not.  This was accompanied by nausea and vomiting x4 days which has subsequently caused poor p.o. intake and severe dehydration.  CT of the head done on admission showed no acute intracranial abnormalities.  A chest x-ray was performed and was significant only for bronchial inflammation indicating bronchitis.  A CT of the abdomen and pelvis without contrast was also performed and revealed fluid-filled distended colonic loops suggesting ileus and/or enteritis.  He was noted to be hypotensive while in the ED and was additionally found to have sepsis in addition to acute kidney injury.  He was subsequently started on IV fluid resuscitation and admitted to the hospital for further evaluation and treatment    Interval Problem Update  Reports ongoing nausea and watery diarrhea.  Check stool for C. difficile.  Denies any vomiting.  Patient is able to tolerate full liquid diet.  He denies any fevers or chills.  Potassium is low at 3 will replace orally.  Creatinine has improved to 2.21.  PT OT eval    12/5 continues to report nausea, vomiting and diarrhea. His abdomen is soft and nontender. No fevers or chills. Continue IV fluids. ADRIENNE is improving.     12/6 patient reports some improvement in his nausea however continues to have diarrhea.  His potassium was found to be low at 2.9.  Will replace orally.  Advance diet as tolerated to GI soft.  PT OT    Consultants/Specialty  None    Code Status  Full Code    Disposition  PT OT eval and likely home with home health    Review of Systems  Review of Systems   Constitutional: Positive for malaise/fatigue. Negative for chills,  diaphoresis and fever.   HENT: Negative for hearing loss and sore throat.    Eyes: Negative for blurred vision.   Respiratory: Negative for cough, sputum production, shortness of breath and wheezing.    Cardiovascular: Negative for chest pain, palpitations and leg swelling.   Gastrointestinal: Positive for abdominal pain, diarrhea and nausea. Negative for blood in stool and vomiting.   Genitourinary: Negative for dysuria, flank pain and urgency.   Musculoskeletal: Positive for falls. Negative for back pain, joint pain, myalgias and neck pain.   Skin: Negative for rash.   Neurological: Positive for weakness. Negative for dizziness, focal weakness, seizures and headaches.   Endo/Heme/Allergies: Does not bruise/bleed easily.   Psychiatric/Behavioral: Negative for suicidal ideas.   All other systems reviewed and are negative.       Physical Exam  Temp:  [36.2 °C (97.1 °F)-36.6 °C (97.9 °F)] 36.4 °C (97.6 °F)  Pulse:  [] 89  Resp:  [16] 16  BP: (120-150)/(74-87) 120/74  SpO2:  [96 %-98 %] 98 %    Physical Exam  Vitals signs and nursing note reviewed.   Constitutional:       General: He is not in acute distress.     Appearance: Normal appearance.   HENT:      Head: Normocephalic and atraumatic.      Nose: Nose normal.      Mouth/Throat:      Mouth: Mucous membranes are moist.   Eyes:      Extraocular Movements: Extraocular movements intact.      Conjunctiva/sclera: Conjunctivae normal.      Pupils: Pupils are equal, round, and reactive to light.   Neck:      Musculoskeletal: Normal range of motion and neck supple.   Cardiovascular:      Rate and Rhythm: Normal rate and regular rhythm.      Pulses: Normal pulses.      Heart sounds: Normal heart sounds.   Pulmonary:      Effort: Pulmonary effort is normal. No respiratory distress.      Breath sounds: Normal breath sounds. No wheezing, rhonchi or rales.   Abdominal:      General: Bowel sounds are normal. There is no distension.      Palpations: Abdomen is soft.       Tenderness: There is no abdominal tenderness.   Musculoskeletal: Normal range of motion.         General: No swelling or tenderness.   Lymphadenopathy:      Cervical: No cervical adenopathy.   Skin:     General: Skin is warm.      Coloration: Skin is not jaundiced.      Findings: No rash.   Neurological:      General: No focal deficit present.      Mental Status: He is alert and oriented to person, place, and time.      Cranial Nerves: No cranial nerve deficit.      Motor: No weakness.   Psychiatric:         Mood and Affect: Mood normal.         Behavior: Behavior normal.         Fluids    Intake/Output Summary (Last 24 hours) at 12/6/2020 1052  Last data filed at 12/6/2020 0635  Gross per 24 hour   Intake 2040 ml   Output 450 ml   Net 1590 ml       Laboratory  Recent Labs     12/04/20  0058 12/05/20  0127 12/06/20  0043   WBC 10.0 10.6 10.1   RBC 5.17 4.97 4.38*   HEMOGLOBIN 15.6 14.9 13.1*   HEMATOCRIT 45.3 44.0 38.3*   MCV 87.6 88.5 87.4   MCH 30.2 30.0 29.9   MCHC 34.4 33.9 34.2   RDW 44.5 44.8 44.4   PLATELETCT 225 192 172   MPV 11.8 12.0 12.4     Recent Labs     12/04/20  0058 12/05/20  0127 12/06/20  0043   SODIUM 140 142 144   POTASSIUM 3.0* 3.3* 2.9*   CHLORIDE 106 109 112   CO2 21 25 26   GLUCOSE 252* 167* 193*   BUN 97* 67* 40*   CREATININE 2.21* 1.69* 1.20   CALCIUM 8.3* 8.2* 7.8*                   Imaging  US-RENAL   Final Result      No hydronephrosis is identified      CT-ABDOMEN-PELVIS W/O   Final Result         1.  Fluid-filled distended colonic loops suggests ileus and/or enteritis.   2.  Fat-containing bilateral inguinal hernias   3.  Atherosclerosis and atherosclerotic coronary artery disease.      DX-CHEST-PORTABLE (1 VIEW)   Final Result         1.  No focal infiltrates.   2.  Perihilar interstitial prominence and bronchial wall cuffing, appearance suggests changes of underlying bronchial inflammation, consider bronchitis.      CT-HEAD W/O   Final Result         1.  No acute intracranial  abnormality is identified, there are nonspecific white matter changes, commonly associated with small vessel ischemic disease.  Associated mild cerebral atrophy is noted.   2.  Atherosclerosis.           Assessment/Plan  * Sepsis ([facility])- (present on admission)  Assessment & Plan  This is Severe Sepsis Present on admission  SIRS criteria identified on my evaluation include: Tachycardia, with heart rate greater than 90 BPM, Tachypnea, with respirations greater than 20 per minute and Leukocytosis, with WBC greater than 12,000  Source of infection is suspected intra-abdominal  Clinical indicators of end organ dysfunction include Creatinine >2.0 (Without ESRD or CKD)  Sepsis protocol initiated  Fluid resuscitation ordered per protocol  IV antibiotics as appropriate for source of sepsis  Reassessment: I have reassessed the patient's hemodynamic status  End organ dysfunction include(s):  Acute kidney failure      Enteritis- (present on admission)  Assessment & Plan  Continue with IV fluid resuscitation, analgesics for pain and advance to soft diet as tolerated  C diff negative    ADRIENNE (acute kidney injury) ([facility])- (present on admission)  Assessment & Plan  Urinalysis ordered and pending  Likely secondary to sepsis compounded with hypotension leading to poor renal perfusion.  Monitor urine output and consider bladder scan if an uric/oliguric    Hypokalemia- (present on admission)  Assessment & Plan  Check mag  Replace with p.o. potassium  Monitor BMP    NSTEMI (non-ST elevated myocardial infarction) ([facility])- (present on admission)  Assessment & Plan  Likely NSTEMI type II from demand ischemia  Trend troponins  Twelve-lead EKG ordered and pending  No chest pain on physical examination and likely component of ADRIENNE       VTE prophylaxis: Heparin

## 2020-12-06 NOTE — CARE PLAN
Problem: Communication  Goal: The ability to communicate needs accurately and effectively will improve  Outcome: PROGRESSING AS EXPECTED  Note: The patient effectively uses the electronic call light system. Hourly rounding is in place.      Problem: Safety  Goal: Will remain free from injury  Outcome: PROGRESSING AS EXPECTED  Note: Patient is in bed with 3 rails placed up. Patient belongings and call light are within reach. [place]onslip socks are worn and bed is at the lowest position.

## 2020-12-06 NOTE — PROGRESS NOTES
Assumed care of patient at 0700 from Bernadette Neal RN. Patient is A&O x 4, states pain level is 4/10. Bed locked in lowest position with 3 rails up. Call light in place, belongings at bedside. Patient expresses no needs at this time and hourly rounding is in place.

## 2020-12-07 PROBLEM — R53.81 DEBILITY: Status: ACTIVE | Noted: 2020-12-07

## 2020-12-07 LAB
ANION GAP SERPL CALC-SCNC: 12 MMOL/L (ref 7–16)
APPEARANCE UR: CLEAR
BACTERIA #/AREA URNS HPF: NEGATIVE /HPF
BASOPHILS # BLD AUTO: 0.6 % (ref 0–1.8)
BASOPHILS # BLD: 0.07 K/UL (ref 0–0.12)
BILIRUB UR QL STRIP.AUTO: NEGATIVE
BUN SERPL-MCNC: 20 MG/DL (ref 8–22)
CALCIUM SERPL-MCNC: 8 MG/DL (ref 8.5–10.5)
CHLORIDE SERPL-SCNC: 107 MMOL/L (ref 96–112)
CO2 SERPL-SCNC: 24 MMOL/L (ref 20–33)
COLOR UR: YELLOW
CREAT SERPL-MCNC: 0.94 MG/DL (ref 0.5–1.4)
EKG IMPRESSION: NORMAL
EOSINOPHIL # BLD AUTO: 0.05 K/UL (ref 0–0.51)
EOSINOPHIL NFR BLD: 0.4 % (ref 0–6.9)
EPI CELLS #/AREA URNS HPF: NEGATIVE /HPF
ERYTHROCYTE [DISTWIDTH] IN BLOOD BY AUTOMATED COUNT: 46.4 FL (ref 35.9–50)
GLUCOSE BLD-MCNC: 191 MG/DL (ref 65–99)
GLUCOSE BLD-MCNC: 209 MG/DL (ref 65–99)
GLUCOSE BLD-MCNC: 212 MG/DL (ref 65–99)
GLUCOSE BLD-MCNC: 218 MG/DL (ref 65–99)
GLUCOSE SERPL-MCNC: 192 MG/DL (ref 65–99)
GLUCOSE UR STRIP.AUTO-MCNC: 100 MG/DL
HCT VFR BLD AUTO: 37.9 % (ref 42–52)
HGB BLD-MCNC: 12.3 G/DL (ref 14–18)
HYALINE CASTS #/AREA URNS LPF: ABNORMAL /LPF
IMM GRANULOCYTES # BLD AUTO: 0.29 K/UL (ref 0–0.11)
IMM GRANULOCYTES NFR BLD AUTO: 2.6 % (ref 0–0.9)
KETONES UR STRIP.AUTO-MCNC: ABNORMAL MG/DL
LEUKOCYTE ESTERASE UR QL STRIP.AUTO: NEGATIVE
LYMPHOCYTES # BLD AUTO: 1.63 K/UL (ref 1–4.8)
LYMPHOCYTES NFR BLD: 14.5 % (ref 22–41)
MCH RBC QN AUTO: 29.4 PG (ref 27–33)
MCHC RBC AUTO-ENTMCNC: 32.5 G/DL (ref 33.7–35.3)
MCV RBC AUTO: 90.7 FL (ref 81.4–97.8)
MICRO URNS: ABNORMAL
MONOCYTES # BLD AUTO: 1.23 K/UL (ref 0–0.85)
MONOCYTES NFR BLD AUTO: 11 % (ref 0–13.4)
NEUTROPHILS # BLD AUTO: 7.94 K/UL (ref 1.82–7.42)
NEUTROPHILS NFR BLD: 70.9 % (ref 44–72)
NITRITE UR QL STRIP.AUTO: NEGATIVE
NRBC # BLD AUTO: 0 K/UL
NRBC BLD-RTO: 0 /100 WBC
PH UR STRIP.AUTO: 6.5 [PH] (ref 5–8)
PLATELET # BLD AUTO: 175 K/UL (ref 164–446)
PMV BLD AUTO: 12.3 FL (ref 9–12.9)
POTASSIUM SERPL-SCNC: 3.4 MMOL/L (ref 3.6–5.5)
PROT UR QL STRIP: NEGATIVE MG/DL
RBC # BLD AUTO: 4.18 M/UL (ref 4.7–6.1)
RBC # URNS HPF: ABNORMAL /HPF
RBC UR QL AUTO: ABNORMAL
SODIUM SERPL-SCNC: 143 MMOL/L (ref 135–145)
SP GR UR STRIP.AUTO: 1.01
UROBILINOGEN UR STRIP.AUTO-MCNC: 0.2 MG/DL
WBC # BLD AUTO: 11.2 K/UL (ref 4.8–10.8)
WBC #/AREA URNS HPF: ABNORMAL /HPF

## 2020-12-07 PROCEDURE — 93010 ELECTROCARDIOGRAM REPORT: CPT | Performed by: INTERNAL MEDICINE

## 2020-12-07 PROCEDURE — 82962 GLUCOSE BLOOD TEST: CPT | Mod: 91

## 2020-12-07 PROCEDURE — 700111 HCHG RX REV CODE 636 W/ 250 OVERRIDE (IP): Performed by: INTERNAL MEDICINE

## 2020-12-07 PROCEDURE — 81001 URINALYSIS AUTO W/SCOPE: CPT

## 2020-12-07 PROCEDURE — 51798 US URINE CAPACITY MEASURE: CPT

## 2020-12-07 PROCEDURE — 36415 COLL VENOUS BLD VENIPUNCTURE: CPT

## 2020-12-07 PROCEDURE — 99232 SBSQ HOSP IP/OBS MODERATE 35: CPT | Performed by: INTERNAL MEDICINE

## 2020-12-07 PROCEDURE — A9270 NON-COVERED ITEM OR SERVICE: HCPCS | Performed by: STUDENT IN AN ORGANIZED HEALTH CARE EDUCATION/TRAINING PROGRAM

## 2020-12-07 PROCEDURE — 85025 COMPLETE CBC W/AUTO DIFF WBC: CPT

## 2020-12-07 PROCEDURE — 80048 BASIC METABOLIC PNL TOTAL CA: CPT

## 2020-12-07 PROCEDURE — 97535 SELF CARE MNGMENT TRAINING: CPT

## 2020-12-07 PROCEDURE — A9270 NON-COVERED ITEM OR SERVICE: HCPCS | Performed by: INTERNAL MEDICINE

## 2020-12-07 PROCEDURE — 700102 HCHG RX REV CODE 250 W/ 637 OVERRIDE(OP): Performed by: INTERNAL MEDICINE

## 2020-12-07 PROCEDURE — 97116 GAIT TRAINING THERAPY: CPT

## 2020-12-07 PROCEDURE — 700102 HCHG RX REV CODE 250 W/ 637 OVERRIDE(OP): Performed by: STUDENT IN AN ORGANIZED HEALTH CARE EDUCATION/TRAINING PROGRAM

## 2020-12-07 PROCEDURE — 700111 HCHG RX REV CODE 636 W/ 250 OVERRIDE (IP): Performed by: HOSPITALIST

## 2020-12-07 PROCEDURE — 770006 HCHG ROOM/CARE - MED/SURG/GYN SEMI*

## 2020-12-07 PROCEDURE — 93005 ELECTROCARDIOGRAM TRACING: CPT | Performed by: INTERNAL MEDICINE

## 2020-12-07 RX ORDER — POTASSIUM CHLORIDE 20 MEQ/1
40 TABLET, EXTENDED RELEASE ORAL ONCE
Status: COMPLETED | OUTPATIENT
Start: 2020-12-07 | End: 2020-12-07

## 2020-12-07 RX ORDER — ONDANSETRON 4 MG/1
4 TABLET, ORALLY DISINTEGRATING ORAL EVERY 4 HOURS PRN
Status: DISCONTINUED | OUTPATIENT
Start: 2020-12-07 | End: 2020-12-08

## 2020-12-07 RX ORDER — TAMSULOSIN HYDROCHLORIDE 0.4 MG/1
0.4 CAPSULE ORAL
Status: DISCONTINUED | OUTPATIENT
Start: 2020-12-07 | End: 2020-12-10 | Stop reason: HOSPADM

## 2020-12-07 RX ORDER — METOCLOPRAMIDE HYDROCHLORIDE 5 MG/ML
10 INJECTION INTRAMUSCULAR; INTRAVENOUS EVERY 8 HOURS PRN
Status: DISCONTINUED | OUTPATIENT
Start: 2020-12-07 | End: 2020-12-09

## 2020-12-07 RX ADMIN — APIXABAN 5 MG: 5 TABLET, FILM COATED ORAL at 17:45

## 2020-12-07 RX ADMIN — Medication 1000 UNITS: at 05:45

## 2020-12-07 RX ADMIN — METOCLOPRAMIDE 10 MG: 5 INJECTION, SOLUTION INTRAMUSCULAR; INTRAVENOUS at 18:19

## 2020-12-07 RX ADMIN — INSULIN HUMAN 2 UNITS: 100 INJECTION, SOLUTION PARENTERAL at 21:00

## 2020-12-07 RX ADMIN — INSULIN HUMAN 2 UNITS: 100 INJECTION, SOLUTION PARENTERAL at 17:48

## 2020-12-07 RX ADMIN — LORAZEPAM 1 MG: 2 INJECTION INTRAMUSCULAR; INTRAVENOUS at 07:50

## 2020-12-07 RX ADMIN — APIXABAN 5 MG: 5 TABLET, FILM COATED ORAL at 05:45

## 2020-12-07 RX ADMIN — LOPERAMIDE HYDROCHLORIDE 4 MG: 2 CAPSULE ORAL at 06:32

## 2020-12-07 RX ADMIN — ATORVASTATIN CALCIUM 20 MG: 20 TABLET, FILM COATED ORAL at 21:08

## 2020-12-07 RX ADMIN — POTASSIUM CHLORIDE 40 MEQ: 1500 TABLET, EXTENDED RELEASE ORAL at 09:01

## 2020-12-07 RX ADMIN — INSULIN HUMAN 2 UNITS: 100 INJECTION, SOLUTION PARENTERAL at 13:58

## 2020-12-07 RX ADMIN — INSULIN HUMAN 1 UNITS: 100 INJECTION, SOLUTION PARENTERAL at 09:01

## 2020-12-07 ASSESSMENT — ENCOUNTER SYMPTOMS
FALLS: 1
BLOOD IN STOOL: 0
SPUTUM PRODUCTION: 0
BLURRED VISION: 0
WHEEZING: 0
DIAPHORESIS: 0
HEADACHES: 0
WEAKNESS: 1
SORE THROAT: 0
PALPITATIONS: 0
VOMITING: 0
MYALGIAS: 0
DIARRHEA: 1
FLANK PAIN: 0
NAUSEA: 1
SHORTNESS OF BREATH: 0
BACK PAIN: 0
FOCAL WEAKNESS: 0
COUGH: 0
CHILLS: 0
ABDOMINAL PAIN: 1
BRUISES/BLEEDS EASILY: 0
FEVER: 0
DIZZINESS: 0
SEIZURES: 0
NECK PAIN: 0

## 2020-12-07 ASSESSMENT — GAIT ASSESSMENTS
GAIT LEVEL OF ASSIST: SUPERVISED
ASSISTIVE DEVICE: FRONT WHEEL WALKER
DISTANCE (FEET): 85

## 2020-12-07 ASSESSMENT — COGNITIVE AND FUNCTIONAL STATUS - GENERAL
SUGGESTED CMS G CODE MODIFIER MOBILITY: CI
DAILY ACTIVITIY SCORE: 21
SUGGESTED CMS G CODE MODIFIER DAILY ACTIVITY: CJ
MOBILITY SCORE: 23
TOILETING: A LITTLE
HELP NEEDED FOR BATHING: A LITTLE
CLIMB 3 TO 5 STEPS WITH RAILING: A LITTLE
DRESSING REGULAR LOWER BODY CLOTHING: A LITTLE

## 2020-12-07 NOTE — PROGRESS NOTES
[facility] Medicine Daily Progress Note    Date of Service  12/7/2020    Chief Complaint  75 y.o. male admitted 12/2/2020 with ground-level fall, nausea, vomiting and diarrhea    Hospital Course  Mr. Vega is a 75-year-old male who presented to the emergency department on 12/2/2020 with complaints of fall.  He is unsure if he lost consciousness or not.  This was accompanied by nausea and vomiting x4 days which has subsequently caused poor p.o. intake and severe dehydration.  CT of the head done on admission showed no acute intracranial abnormalities.  A chest x-ray was performed and was significant only for bronchial inflammation indicating bronchitis.  A CT of the abdomen and pelvis without contrast was also performed and revealed fluid-filled distended colonic loops suggesting ileus and/or enteritis.  He was noted to be hypotensive while in the ED and was additionally found to have sepsis in addition to acute kidney injury.  He was subsequently started on IV fluid resuscitation and admitted to the hospital for further evaluation and treatment    Interval Problem Update  Reports ongoing nausea and watery diarrhea.  Check stool for C. difficile.  Denies any vomiting.  Patient is able to tolerate full liquid diet.  He denies any fevers or chills.  Potassium is low at 3 will replace orally.  Creatinine has improved to 2.21.  PT OT eval    12/5 continues to report nausea, vomiting and diarrhea. His abdomen is soft and nontender. No fevers or chills. Continue IV fluids. ADRIENNE is improving.     12/6 patient reports some improvement in his nausea however continues to have diarrhea.  His potassium was found to be low at 2.9.  Will replace orally.  Advance diet as tolerated to GI soft.  PT OT    12/7 Reports on going nausea. Check EKG to evaluate QTC, if less than 500 ok to use Zofran. Continue PT OT.     Consultants/Specialty  None    Code Status  Full Code    Disposition  PT OT eval and likely home with home  health    Review of Systems  Review of Systems   Constitutional: Positive for malaise/fatigue. Negative for chills, diaphoresis and fever.   HENT: Negative for hearing loss and sore throat.    Eyes: Negative for blurred vision.   Respiratory: Negative for cough, sputum production, shortness of breath and wheezing.    Cardiovascular: Negative for chest pain, palpitations and leg swelling.   Gastrointestinal: Positive for abdominal pain, diarrhea and nausea. Negative for blood in stool and vomiting.   Genitourinary: Negative for dysuria, flank pain and urgency.   Musculoskeletal: Positive for falls. Negative for back pain, joint pain, myalgias and neck pain.   Skin: Negative for rash.   Neurological: Positive for weakness. Negative for dizziness, focal weakness, seizures and headaches.   Endo/Heme/Allergies: Does not bruise/bleed easily.   Psychiatric/Behavioral: Negative for suicidal ideas.   All other systems reviewed and are negative.       Physical Exam  Temp:  [36.4 °C (97.6 °F)-36.7 °C (98 °F)] 36.4 °C (97.6 °F)  Pulse:  [] 105  Resp:  [15-18] 15  BP: (122-182)/() 128/73  SpO2:  [96 %-98 %] 96 %    Physical Exam  Vitals signs and nursing note reviewed.   Constitutional:       General: He is not in acute distress.     Appearance: Normal appearance.   HENT:      Head: Normocephalic and atraumatic.      Nose: Nose normal.      Mouth/Throat:      Mouth: Mucous membranes are moist.   Eyes:      Extraocular Movements: Extraocular movements intact.      Conjunctiva/sclera: Conjunctivae normal.      Pupils: Pupils are equal, round, and reactive to light.   Neck:      Musculoskeletal: Normal range of motion and neck supple.   Cardiovascular:      Rate and Rhythm: Normal rate and regular rhythm.      Pulses: Normal pulses.      Heart sounds: Normal heart sounds.   Pulmonary:      Effort: Pulmonary effort is normal. No respiratory distress.      Breath sounds: Normal breath sounds. No wheezing, rhonchi or  rales.   Abdominal:      General: Bowel sounds are normal. There is no distension.      Palpations: Abdomen is soft.      Tenderness: There is no abdominal tenderness.   Musculoskeletal: Normal range of motion.         General: No swelling or tenderness.   Lymphadenopathy:      Cervical: No cervical adenopathy.   Skin:     General: Skin is warm.      Coloration: Skin is not jaundiced.      Findings: No rash.   Neurological:      General: No focal deficit present.      Mental Status: He is alert and oriented to person, place, and time.      Cranial Nerves: No cranial nerve deficit.      Motor: No weakness.   Psychiatric:         Mood and Affect: Mood normal.         Behavior: Behavior normal.         Fluids    Intake/Output Summary (Last 24 hours) at 12/7/2020 1133  Last data filed at 12/7/2020 0639  Gross per 24 hour   Intake 1920 ml   Output 1775 ml   Net 145 ml       Laboratory  Recent Labs     12/05/20  0127 12/06/20  0043 12/07/20  0856   WBC 10.6 10.1 11.2*   RBC 4.97 4.38* 4.18*   HEMOGLOBIN 14.9 13.1* 12.3*   HEMATOCRIT 44.0 38.3* 37.9*   MCV 88.5 87.4 90.7   MCH 30.0 29.9 29.4   MCHC 33.9 34.2 32.5*   RDW 44.8 44.4 46.4   PLATELETCT 192 172 175   MPV 12.0 12.4 12.3     Recent Labs     12/05/20  0127 12/06/20  0043 12/07/20  0856   SODIUM 142 144 143   POTASSIUM 3.3* 2.9* 3.4*   CHLORIDE 109 112 107   CO2 25 26 24   GLUCOSE 167* 193* 192*   BUN 67* 40* 20   CREATININE 1.69* 1.20 0.94   CALCIUM 8.2* 7.8* 8.0*                   Imaging  US-RENAL   Final Result      No hydronephrosis is identified      CT-ABDOMEN-PELVIS W/O   Final Result         1.  Fluid-filled distended colonic loops suggests ileus and/or enteritis.   2.  Fat-containing bilateral inguinal hernias   3.  Atherosclerosis and atherosclerotic coronary artery disease.      DX-CHEST-PORTABLE (1 VIEW)   Final Result         1.  No focal infiltrates.   2.  Perihilar interstitial prominence and bronchial wall cuffing, appearance suggests changes of  underlying bronchial inflammation, consider bronchitis.      CT-HEAD W/O   Final Result         1.  No acute intracranial abnormality is identified, there are nonspecific white matter changes, commonly associated with small vessel ischemic disease.  Associated mild cerebral atrophy is noted.   2.  Atherosclerosis.           Assessment/Plan  * Sepsis ([facility])- (present on admission)  Assessment & Plan  This is Severe Sepsis Present on admission  SIRS criteria identified on my evaluation include: Tachycardia, with heart rate greater than 90 BPM, Tachypnea, with respirations greater than 20 per minute and Leukocytosis, with WBC greater than 12,000  Source of infection is suspected intra-abdominal  Clinical indicators of end organ dysfunction include Creatinine >2.0 (Without ESRD or CKD)  Sepsis protocol initiated  Fluid resuscitation ordered per protocol  IV antibiotics as appropriate for source of sepsis  Reassessment: I have reassessed the patient's hemodynamic status  End organ dysfunction include(s):  Acute kidney failure      Enteritis- (present on admission)  Assessment & Plan  Continue with IV fluid resuscitation, analgesics for pain and advance to soft diet as tolerated  C diff negative    ADRIENNE (acute kidney injury) ([facility])- (present on admission)  Assessment & Plan  Likely secondary to sepsis compounded with hypotension leading to poor renal perfusion.  Monitor urine output and consider bladder scan if an uric/oliguric  Resolved    Debility- (present on admission)  Assessment & Plan  PT OT    Hypokalemia- (present on admission)  Assessment & Plan  Check mag  Replace with p.o. potassium  Monitor BMP    NSTEMI (non-ST elevated myocardial infarction) ([facility])- (present on admission)  Assessment & Plan  Likely NSTEMI type II from demand ischemia  Trend troponins  Twelve-lead EKG ordered and pending  No chest pain on physical examination and likely component of ADRIENNE       VTE prophylaxis: Heparin

## 2020-12-07 NOTE — THERAPY
Physical Therapy   Daily Treatment     Patient Name: Lakhwinder Vega  Age:  75 y.o., Sex:  male  Medical Record #: [id number]  Today's Date: 12/7/2020     Precautions: Fall Risk    Assessment    Rec'd in bed, alert and agreeable to work w/ PT.  He is able to move supine to the eob w/o assist and stand w/o assist.  He did ambulate 85 ft w/ fww w/o loss of balance.  He has made significant improvement today vs initial eval. At this point, as he lives alone, he would best be served w/ post acute placement, however, if he remains in house, and continues to progress as he did today, he may be able to d/c home w/ HH.  Highly recommend oob/amb prn w/ fww and nsg spv.    Plan    Continue current treatment plan.    DC Equipment Recommendations: Unable to determine at this time  Discharge Recommendations: Recommend post-acute placement for additional physical therapy services prior to discharge home        Objective       12/07/20 1226   Balance   Sitting Balance (Static) Fair +   Sitting Balance (Dynamic) Fair +   Standing Balance (Static) Fair   Standing Balance (Dynamic) Fair   Weight Shift Sitting Good   Weight Shift Standing Good   Comments w/ fww   Gait Analysis   Gait Level Of Assist Supervised   Assistive Device Front Wheel Walker   Distance (Feet) 85   Bed Mobility    Supine to Sit Supervised   Functional Mobility   Sit to Stand Supervised   Bed, Chair, Wheelchair Transfer Supervised   Skilled Intervention Verbal Cuing   Short Term Goals    Short Term Goal # 1 Pt will perform gait x150' with FWW at spvin 6tx to improve functional independence.    Goal Outcome # 1 goal not met   Short Term Goal # 2 Pt will ascend/descend x10 stairs at spv in 6tx to improve ability to enter/leave his home.    Goal Outcome # 2 Goal not met   Anticipated Discharge Equipment and Recommendations   DC Equipment Recommendations Unable to determine at this time   Discharge Recommendations Recommend post-acute placement for additional physical  therapy services prior to discharge home

## 2020-12-07 NOTE — CARE PLAN
Problem: Communication  Goal: The ability to communicate needs accurately and effectively will improve  Outcome: PROGRESSING AS EXPECTED  Note: Pt able to use call light appropriately to communicate needs     Problem: Fluid Volume:  Goal: Will maintain balanced intake and output  Outcome: PROGRESSING SLOWER THAN EXPECTED  Note: PT experiencing difficulty voiding. Bladder scanning and straight cathing

## 2020-12-07 NOTE — CARE PLAN
Problem: Communication  Goal: The ability to communicate needs accurately and effectively will improve  Outcome: PROGRESSING AS EXPECTED  Note: Pt alerting staff of needs as well as after emesis episodes to staff is able to document accordingly.      Problem: Mobility  Goal: Risk for activity intolerance will decrease  Outcome: PROGRESSING AS EXPECTED  Note: Pt to EOB and up to chairs for meals. Pt understanding of importance.

## 2020-12-07 NOTE — PROGRESS NOTES
Report received from dayshift RN. Pt is AOx4. Denies pain. Denies nausea. Will continue to monitor.

## 2020-12-07 NOTE — PROGRESS NOTES
Assume care of pt at 0700. Report received from NOC RN. Pt is A/O x4. Pt denies pain. Pt is resting in bed. Bed in lowest and locked position, call light in reach, hourly rounding in place. Labs reviewed. Communication board updated. Will continue to monitor.     Pt nauseous this AM with 1 episode of emesis. Pt states that he is unable to eat due to the nausea. Ativan given per MAR. No other complaints at this time. NANDO.

## 2020-12-08 ENCOUNTER — APPOINTMENT (OUTPATIENT)
Dept: RADIOLOGY | Facility: MEDICAL CENTER | Age: 75
DRG: 871 | End: 2020-12-08
Attending: INTERNAL MEDICINE
Payer: COMMERCIAL

## 2020-12-08 PROBLEM — M79.89 LEFT UPPER EXTREMITY SWELLING: Status: ACTIVE | Noted: 2020-12-08

## 2020-12-08 PROBLEM — I48.0 PAROXYSMAL ATRIAL FIBRILLATION (HCC): Status: ACTIVE | Noted: 2020-12-08

## 2020-12-08 LAB
ANION GAP SERPL CALC-SCNC: 4 MMOL/L (ref 7–16)
BACTERIA BLD CULT: NORMAL
BACTERIA BLD CULT: NORMAL
BASOPHILS # BLD AUTO: 0 % (ref 0–1.8)
BASOPHILS # BLD: 0 K/UL (ref 0–0.12)
BUN SERPL-MCNC: 17 MG/DL (ref 8–22)
CALCIUM SERPL-MCNC: 7.1 MG/DL (ref 8.5–10.5)
CHLORIDE SERPL-SCNC: 108 MMOL/L (ref 96–112)
CO2 SERPL-SCNC: 27 MMOL/L (ref 20–33)
CREAT SERPL-MCNC: 0.81 MG/DL (ref 0.5–1.4)
EOSINOPHIL # BLD AUTO: 0.27 K/UL (ref 0–0.51)
EOSINOPHIL NFR BLD: 2.6 % (ref 0–6.9)
ERYTHROCYTE [DISTWIDTH] IN BLOOD BY AUTOMATED COUNT: 44.6 FL (ref 35.9–50)
GLUCOSE BLD-MCNC: 184 MG/DL (ref 65–99)
GLUCOSE BLD-MCNC: 185 MG/DL (ref 65–99)
GLUCOSE BLD-MCNC: 196 MG/DL (ref 65–99)
GLUCOSE BLD-MCNC: 211 MG/DL (ref 65–99)
GLUCOSE SERPL-MCNC: 213 MG/DL (ref 65–99)
HCT VFR BLD AUTO: 30.7 % (ref 42–52)
HGB BLD-MCNC: 10.4 G/DL (ref 14–18)
LYMPHOCYTES # BLD AUTO: 1.17 K/UL (ref 1–4.8)
LYMPHOCYTES NFR BLD: 11.4 % (ref 22–41)
MANUAL DIFF BLD: NORMAL
MCH RBC QN AUTO: 30 PG (ref 27–33)
MCHC RBC AUTO-ENTMCNC: 33.9 G/DL (ref 33.7–35.3)
MCV RBC AUTO: 88.5 FL (ref 81.4–97.8)
MONOCYTES # BLD AUTO: 0.99 K/UL (ref 0–0.85)
MONOCYTES NFR BLD AUTO: 9.6 % (ref 0–13.4)
MORPHOLOGY BLD-IMP: NORMAL
MYELOCYTES NFR BLD MANUAL: 0.9 %
NEUTROPHILS # BLD AUTO: 7.78 K/UL (ref 1.82–7.42)
NEUTROPHILS NFR BLD: 74.6 % (ref 44–72)
NEUTS BAND NFR BLD MANUAL: 0.9 % (ref 0–10)
NRBC # BLD AUTO: 0 K/UL
NRBC BLD-RTO: 0 /100 WBC
PLATELET # BLD AUTO: 152 K/UL (ref 164–446)
PLATELET BLD QL SMEAR: NORMAL
PMV BLD AUTO: 12.7 FL (ref 9–12.9)
POTASSIUM SERPL-SCNC: 3.5 MMOL/L (ref 3.6–5.5)
RBC # BLD AUTO: 3.47 M/UL (ref 4.7–6.1)
SIGNIFICANT IND 70042: NORMAL
SIGNIFICANT IND 70042: NORMAL
SITE SITE: NORMAL
SITE SITE: NORMAL
SODIUM SERPL-SCNC: 139 MMOL/L (ref 135–145)
SOURCE SOURCE: NORMAL
SOURCE SOURCE: NORMAL
WBC # BLD AUTO: 10.3 K/UL (ref 4.8–10.8)

## 2020-12-08 PROCEDURE — 82962 GLUCOSE BLOOD TEST: CPT | Mod: 91

## 2020-12-08 PROCEDURE — 770006 HCHG ROOM/CARE - MED/SURG/GYN SEMI*

## 2020-12-08 PROCEDURE — 700105 HCHG RX REV CODE 258: Performed by: NURSE PRACTITIONER

## 2020-12-08 PROCEDURE — A9270 NON-COVERED ITEM OR SERVICE: HCPCS | Performed by: INTERNAL MEDICINE

## 2020-12-08 PROCEDURE — 700102 HCHG RX REV CODE 250 W/ 637 OVERRIDE(OP): Performed by: INTERNAL MEDICINE

## 2020-12-08 PROCEDURE — A9270 NON-COVERED ITEM OR SERVICE: HCPCS | Performed by: STUDENT IN AN ORGANIZED HEALTH CARE EDUCATION/TRAINING PROGRAM

## 2020-12-08 PROCEDURE — 93971 EXTREMITY STUDY: CPT | Mod: 26,LT | Performed by: INTERNAL MEDICINE

## 2020-12-08 PROCEDURE — 85007 BL SMEAR W/DIFF WBC COUNT: CPT

## 2020-12-08 PROCEDURE — 700102 HCHG RX REV CODE 250 W/ 637 OVERRIDE(OP): Performed by: FAMILY MEDICINE

## 2020-12-08 PROCEDURE — 85027 COMPLETE CBC AUTOMATED: CPT

## 2020-12-08 PROCEDURE — 80048 BASIC METABOLIC PNL TOTAL CA: CPT

## 2020-12-08 PROCEDURE — A9270 NON-COVERED ITEM OR SERVICE: HCPCS | Performed by: FAMILY MEDICINE

## 2020-12-08 PROCEDURE — 36415 COLL VENOUS BLD VENIPUNCTURE: CPT

## 2020-12-08 PROCEDURE — 93971 EXTREMITY STUDY: CPT | Mod: LT

## 2020-12-08 PROCEDURE — 700102 HCHG RX REV CODE 250 W/ 637 OVERRIDE(OP): Performed by: STUDENT IN AN ORGANIZED HEALTH CARE EDUCATION/TRAINING PROGRAM

## 2020-12-08 PROCEDURE — 99231 SBSQ HOSP IP/OBS SF/LOW 25: CPT | Performed by: FAMILY MEDICINE

## 2020-12-08 RX ORDER — GLIPIZIDE 5 MG/1
2.5 TABLET ORAL 2 TIMES DAILY
Status: DISCONTINUED | OUTPATIENT
Start: 2020-12-08 | End: 2020-12-09

## 2020-12-08 RX ORDER — CARVEDILOL 25 MG/1
25 TABLET ORAL 2 TIMES DAILY WITH MEALS
Status: DISCONTINUED | OUTPATIENT
Start: 2020-12-08 | End: 2020-12-09

## 2020-12-08 RX ORDER — OMEPRAZOLE 20 MG/1
20 CAPSULE, DELAYED RELEASE ORAL DAILY
Status: DISCONTINUED | OUTPATIENT
Start: 2020-12-08 | End: 2020-12-09

## 2020-12-08 RX ORDER — CARVEDILOL 12.5 MG/1
12.5 TABLET ORAL 2 TIMES DAILY WITH MEALS
Status: DISCONTINUED | OUTPATIENT
Start: 2020-12-08 | End: 2020-12-08

## 2020-12-08 RX ORDER — MIRTAZAPINE 15 MG/1
15 TABLET, FILM COATED ORAL
Status: DISCONTINUED | OUTPATIENT
Start: 2020-12-08 | End: 2020-12-10 | Stop reason: HOSPADM

## 2020-12-08 RX ORDER — GLIPIZIDE 5 MG/1
5 TABLET ORAL 2 TIMES DAILY
Status: DISCONTINUED | OUTPATIENT
Start: 2020-12-08 | End: 2020-12-08

## 2020-12-08 RX ADMIN — INSULIN HUMAN 1 UNITS: 100 INJECTION, SOLUTION PARENTERAL at 13:05

## 2020-12-08 RX ADMIN — INSULIN HUMAN 1 UNITS: 100 INJECTION, SOLUTION PARENTERAL at 17:53

## 2020-12-08 RX ADMIN — SODIUM CHLORIDE, POTASSIUM CHLORIDE, SODIUM LACTATE AND CALCIUM CHLORIDE: 600; 310; 30; 20 INJECTION, SOLUTION INTRAVENOUS at 23:36

## 2020-12-08 RX ADMIN — LOPERAMIDE HYDROCHLORIDE 4 MG: 2 CAPSULE ORAL at 20:22

## 2020-12-08 RX ADMIN — INSULIN HUMAN 2 UNITS: 100 INJECTION, SOLUTION PARENTERAL at 20:20

## 2020-12-08 RX ADMIN — INSULIN HUMAN 1 UNITS: 100 INJECTION, SOLUTION PARENTERAL at 08:51

## 2020-12-08 RX ADMIN — OMEPRAZOLE 20 MG: 20 CAPSULE, DELAYED RELEASE ORAL at 13:03

## 2020-12-08 RX ADMIN — ATORVASTATIN CALCIUM 20 MG: 20 TABLET, FILM COATED ORAL at 20:22

## 2020-12-08 RX ADMIN — MIRTAZAPINE 15 MG: 15 TABLET, FILM COATED ORAL at 20:22

## 2020-12-08 RX ADMIN — TAMSULOSIN HYDROCHLORIDE 0.4 MG: 0.4 CAPSULE ORAL at 09:55

## 2020-12-08 RX ADMIN — SODIUM CHLORIDE, POTASSIUM CHLORIDE, SODIUM LACTATE AND CALCIUM CHLORIDE: 600; 310; 30; 20 INJECTION, SOLUTION INTRAVENOUS at 16:53

## 2020-12-08 RX ADMIN — APIXABAN 5 MG: 5 TABLET, FILM COATED ORAL at 17:57

## 2020-12-08 RX ADMIN — APIXABAN 5 MG: 5 TABLET, FILM COATED ORAL at 04:29

## 2020-12-08 RX ADMIN — Medication 1000 UNITS: at 04:29

## 2020-12-08 RX ADMIN — GLIPIZIDE 2.5 MG: 5 TABLET ORAL at 13:03

## 2020-12-08 ASSESSMENT — ENCOUNTER SYMPTOMS
BLURRED VISION: 0
DIZZINESS: 0
NECK PAIN: 0
COUGH: 0
WEAKNESS: 1
FEVER: 0
PALPITATIONS: 0
BACK PAIN: 0
ABDOMINAL PAIN: 1
DIAPHORESIS: 0
FALLS: 1
TINGLING: 0
SEIZURES: 0
VOMITING: 0
NAUSEA: 0
MYALGIAS: 0
BRUISES/BLEEDS EASILY: 0
HEADACHES: 0
DIARRHEA: 0
HEMOPTYSIS: 0

## 2020-12-08 ASSESSMENT — PAIN DESCRIPTION - PAIN TYPE: TYPE: ACUTE PAIN

## 2020-12-08 NOTE — CARE PLAN
Problem: Safety  Goal: Will remain free from injury  Outcome: PROGRESSING AS EXPECTED   Safety precautions in use including use of call bell for assistance, bed in lowest position, bed/chair alarm utilized and use of treaded socks. Objects in room moved to allow access to bathroom     Problem: Fluid Volume:  Goal: Will maintain balanced intake and output  Outcome: PROGRESSING AS EXPECTED

## 2020-12-08 NOTE — PROGRESS NOTES
[facility] Medicine Daily Progress Note    Date of Service  12/8/2020    Chief Complaint  75 y.o. male admitted 12/2/2020 with ground-level fall, nausea, vomiting and diarrhea    Hospital Course  Mr. Vega is a 75-year-old male who presented to the emergency department on 12/2/2020 with complaints of fall.  He is unsure if he lost consciousness or not.  This was accompanied by nausea and vomiting x4 days which has subsequently caused poor p.o. intake and severe dehydration.  CT of the head done on admission showed no acute intracranial abnormalities.  A chest x-ray was performed and was significant only for bronchial inflammation indicating bronchitis.  A CT of the abdomen and pelvis without contrast was also performed and revealed fluid-filled distended colonic loops suggesting ileus and/or enteritis.  He was noted to be hypotensive while in the ED and was additionally found to have sepsis in addition to acute kidney injury.  He was subsequently started on IV fluid resuscitation and admitted to the hospital for further evaluation and treatment    Interval Problem Update  Reports ongoing nausea and watery diarrhea.  Check stool for C. difficile.  Denies any vomiting.  Patient is able to tolerate full liquid diet.  He denies any fevers or chills.  Potassium is low at 3 will replace orally.  Creatinine has improved to 2.21.  PT OT eval    12/5 continues to report nausea, vomiting and diarrhea. His abdomen is soft and nontender. No fevers or chills. Continue IV fluids. ADRIENNE is improving.     12/6 patient reports some improvement in his nausea however continues to have diarrhea.  His potassium was found to be low at 2.9.  Will replace orally.  Advance diet as tolerated to GI soft.  PT OT    12/7 Reports on going nausea. Check EKG to evaluate QTC, if less than 500 ok to use Zofran. Continue PT OT.   12/8: Sitting up in bed comfortable.  Tolerating p.o. intake but stated that his appetite is poor.  Abdominal pain much  improved.  No nausea or vomiting.  Complain of heartburn so PPI was added.  No acute distress noted.  No issues overnight per staff.  Consultants/Specialty  None    Code Status  Full Code    Disposition  Pending SNF acceptance.    Review of Systems  Review of Systems   Constitutional: Positive for malaise/fatigue. Negative for diaphoresis and fever.   HENT: Negative for hearing loss.    Eyes: Negative for blurred vision.   Respiratory: Negative for cough and hemoptysis.    Cardiovascular: Negative for chest pain and palpitations.   Gastrointestinal: Positive for abdominal pain (Improving). Negative for diarrhea, nausea and vomiting.   Genitourinary: Negative for dysuria and urgency.   Musculoskeletal: Positive for falls. Negative for back pain, myalgias and neck pain.   Skin: Negative for rash.   Neurological: Positive for weakness. Negative for dizziness, tingling, seizures and headaches.   Endo/Heme/Allergies: Does not bruise/bleed easily.   Psychiatric/Behavioral: Negative for suicidal ideas.        Physical Exam  Temp:  [36.4 °C (97.5 °F)-37.1 °C (98.7 °F)] 36.6 °C (97.8 °F)  Pulse:  [107-133] 121  Resp:  [16-17] 16  BP: (109-146)/(71-99) 144/79  SpO2:  [93 %-96 %] 96 %    Physical Exam  Vitals signs and nursing note reviewed.   Constitutional:       Appearance: Normal appearance.   HENT:      Head: Normocephalic and atraumatic.      Nose: Nose normal.      Mouth/Throat:      Mouth: Mucous membranes are moist.   Eyes:      Extraocular Movements: Extraocular movements intact.      Pupils: Pupils are equal, round, and reactive to light.   Neck:      Musculoskeletal: Normal range of motion and neck supple.   Cardiovascular:      Rate and Rhythm: Normal rate and regular rhythm.      Pulses: Normal pulses.      Heart sounds: Normal heart sounds.   Pulmonary:      Effort: Pulmonary effort is normal. No respiratory distress.      Breath sounds: Normal breath sounds. No rhonchi.   Abdominal:      General: Bowel sounds  are normal. There is no distension.      Palpations: Abdomen is soft.      Tenderness: There is no abdominal tenderness.   Musculoskeletal: Normal range of motion.         General: No swelling or tenderness.      Comments: Swelling of the left upper extremity including hand.  No DVT on Doppler study.   Lymphadenopathy:      Cervical: No cervical adenopathy.   Skin:     General: Skin is warm.      Coloration: Skin is not jaundiced.      Findings: No rash.   Neurological:      General: No focal deficit present.      Mental Status: He is alert and oriented to person, place, and time.      Cranial Nerves: No cranial nerve deficit.   Psychiatric:         Mood and Affect: Mood normal.         Behavior: Behavior normal.         Fluids    Intake/Output Summary (Last 24 hours) at 12/8/2020 1204  Last data filed at 12/8/2020 1019  Gross per 24 hour   Intake 2460 ml   Output 1900 ml   Net 560 ml       Laboratory  Recent Labs     12/06/20  0043 12/07/20  0856 12/08/20  0716   WBC 10.1 11.2* 10.3   RBC 4.38* 4.18* 3.47*   HEMOGLOBIN 13.1* 12.3* 10.4*   HEMATOCRIT 38.3* 37.9* 30.7*   MCV 87.4 90.7 88.5   MCH 29.9 29.4 30.0   MCHC 34.2 32.5* 33.9   RDW 44.4 46.4 44.6   PLATELETCT 172 175 152*   MPV 12.4 12.3 12.7     Recent Labs     12/06/20  0043 12/07/20  0856 12/08/20  0716   SODIUM 144 143 139   POTASSIUM 2.9* 3.4* 3.5*   CHLORIDE 112 107 108   CO2 26 24 27   GLUCOSE 193* 192* 213*   BUN 40* 20 17   CREATININE 1.20 0.94 0.81   CALCIUM 7.8* 8.0* 7.1*                   Imaging  US-EXTREMITY VENOUS UPPER UNILAT LEFT   Final Result      US-RENAL   Final Result      No hydronephrosis is identified      CT-ABDOMEN-PELVIS W/O   Final Result         1.  Fluid-filled distended colonic loops suggests ileus and/or enteritis.   2.  Fat-containing bilateral inguinal hernias   3.  Atherosclerosis and atherosclerotic coronary artery disease.      DX-CHEST-PORTABLE (1 VIEW)   Final Result         1.  No focal infiltrates.   2.  Perihilar  interstitial prominence and bronchial wall cuffing, appearance suggests changes of underlying bronchial inflammation, consider bronchitis.      CT-HEAD W/O   Final Result         1.  No acute intracranial abnormality is identified, there are nonspecific white matter changes, commonly associated with small vessel ischemic disease.  Associated mild cerebral atrophy is noted.   2.  Atherosclerosis.           Assessment/Plan  * Sepsis (HCC)- (present on admission)  Assessment & Plan  This is Severe Sepsis Present on admission  SIRS criteria identified on my evaluation include: Tachycardia, with heart rate greater than 90 BPM, Tachypnea, with respirations greater than 20 per minute and Leukocytosis, with WBC greater than 12,000  Source of infection is suspected intra-abdominal  Clinical indicators of end organ dysfunction include Creatinine >2.0 (Without ESRD or CKD)  Sepsis protocol initiated  Fluid resuscitation ordered per protocol  IV antibiotics as appropriate for source of sepsis  Reassessment: I have reassessed the patient's hemodynamic status  End organ dysfunction include(s):  Acute kidney failure      Enteritis- (present on admission)  Assessment & Plan  Continue with IV fluid resuscitation, analgesics for pain and advance to soft diet as tolerated  C diff negative  Completed a course of antibiotics     ADRIENNE (acute kidney injury) ([facility])- (present on admission)  Assessment & Plan  Likely secondary to sepsis compounded with hypotension leading to poor renal perfusion.  Monitor urine output and consider bladder scan if an uric/oliguric  Resolved    Paroxysmal atrial fibrillation ([facility])- (present on admission)  Assessment & Plan  Continue Eliquis and Coreg.    Debility- (present on admission)  Assessment & Plan  PT OT    Hypokalemia- (present on admission)  Assessment & Plan  Check mag  Replace with p.o. potassium  Monitor BMP    NSTEMI (non-ST elevated myocardial infarction) ([facility])- (present on admission)  Assessment  & Plan  Likely NSTEMI type II from demand ischemia  Trend troponins  Twelve-lead EKG ordered and pending  No chest pain on physical examination and likely component of ADRIENNE       VTE prophylaxis: Eliquis

## 2020-12-08 NOTE — DISCHARGE PLANNING
Received Choice form at 0931  Agency/Facility Name: Local [place] [facility]   Referral sent per Choice form @ 1253

## 2020-12-08 NOTE — THERAPY
"Occupational Therapy  Daily Treatment     Patient Name: Lakhwinder Vega  Age:  75 y.o., Sex:  male  Medical Record #: [id number]  Today's Date: 12/7/2020       Precautions: Fall Risk    Assessment    Pt seen for OT tx.  Pt was pleasant but moves slowly & requires an excessive amount of time to perform basic ADL's.  Pt reports he lives alone & has no one to assist with grocery shopping or homemaking tasks.  Pt has limited activity tolerance & would have difficulty returning home alone at this time.  Pt encouraged to be OOB for all meals & amb with staff.    Plan    Continue current treatment plan.  OT tx 3 x/week     DC Equipment Recommendations: Front-Wheel Walker, Tub / Shower Seat  Discharge Recommendations: Recommend post-acute placement for additional occupational therapy services prior to discharge home    Subjective    \"I just don't feel well \"     Objective       12/07/20 1158   Cognition    Cognition / Consciousness X   Speech/ Communication Delayed Responses   Level of Consciousness Alert   New Learning Impaired   Comments Pt was pleasant but required an excessive amount of time to complete basic ADL's   Balance   Sitting Balance (Static) Fair +   Sitting Balance (Dynamic) Fair   Standing Balance (Static) Fair   Standing Balance (Dynamic) Fair -   Weight Shift Sitting Good   Weight Shift Standing Fair   Bed Mobility    Supine to Sit Supervised   Scooting Supervised   Rolling Supervised   Activities of Daily Living   Eating Supervision   Grooming Supervision;Standing   Upper Body Dressing Supervision   Lower Body Dressing Supervision   Functional Mobility   Sit to Stand Supervised   Bed, Chair, Wheelchair Transfer Supervised   Toilet Transfers Supervised   Patient / Family Goals   Patient / Family Goal #1 to go home   Goal #1 Outcome Goal not met   Short Term Goals   Short Term Goal # 1 will complete toilet txf with SPV   Goal Outcome # 1 Progressing as expected   Short Term Goal # 2 will complete standing " g/h with SPV   Goal Outcome # 2 Goal met   Short Term Goal # 3 will complete functional ambulation to BR with SPV and no v/cs for safety   Goal Outcome # 3 Goal not met

## 2020-12-08 NOTE — PROGRESS NOTES
Received report from mariano RN and assumed care of pt. Pt currently resting in bed. Denies any acute needs or concerns at this time. Aox4.    Call light within reach and fall precautions in place. Will monitor

## 2020-12-08 NOTE — DISCHARGE PLANNING
Anticipated Discharge Disposition:   · SNF    Action:   · LSW met with pt at bedside to discuss medical recommendation of SNF. Pt agreeable with plan and would like referral to be sent to all local [place] facilities. Pt is also a  and would like referral to be sent to [facility]. Pt reported his  is in his apt but doesn't have anyone to obtain a copy. LSW [facility]   · CHOICE completed via pt's verbal and faxed to [facility] ext [phone]    Barriers to Discharge:   · Accepting facility.     Plan:   · Care coordination will continue to follow up and provide assistance with discharge plans/barriers.

## 2020-12-08 NOTE — CARE PLAN
Problem: Safety  Goal: Will remain free from falls  Outcome: PROGRESSING AS EXPECTED     Problem: Fluid Volume:  Goal: Will maintain balanced intake and output  Outcome: PROGRESSING AS EXPECTED     Problem: Mobility  Goal: Risk for activity intolerance will decrease  Outcome: PROGRESSING AS EXPECTED     Problem: Skin Integrity  Goal: Risk for impaired skin integrity will decrease  Outcome: PROGRESSING AS EXPECTED     Problem: Respiratory:  Goal: Respiratory status will improve  Outcome: PROGRESSING AS EXPECTED     Problem: Pain Management  Goal: Pain level will decrease to patient's comfort goal  Outcome: PROGRESSING AS EXPECTED

## 2020-12-09 ENCOUNTER — APPOINTMENT (OUTPATIENT)
Dept: RADIOLOGY | Facility: MEDICAL CENTER | Age: 75
DRG: 871 | End: 2020-12-09
Attending: FAMILY MEDICINE
Payer: COMMERCIAL

## 2020-12-09 PROBLEM — R13.10 DYSPHAGIA: Status: ACTIVE | Noted: 2020-12-09

## 2020-12-09 LAB
ALBUMIN SERPL BCP-MCNC: 2.4 G/DL (ref 3.2–4.9)
ALBUMIN/GLOB SERPL: 1 G/DL
ALP SERPL-CCNC: 72 U/L (ref 30–99)
ALT SERPL-CCNC: 15 U/L (ref 2–50)
ANION GAP SERPL CALC-SCNC: 10 MMOL/L (ref 7–16)
AST SERPL-CCNC: 30 U/L (ref 12–45)
BASOPHILS # BLD AUTO: 0.4 % (ref 0–1.8)
BASOPHILS # BLD: 0.04 K/UL (ref 0–0.12)
BILIRUB SERPL-MCNC: 0.7 MG/DL (ref 0.1–1.5)
BUN SERPL-MCNC: 13 MG/DL (ref 8–22)
CALCIUM SERPL-MCNC: 7.4 MG/DL (ref 8.5–10.5)
CHLORIDE SERPL-SCNC: 102 MMOL/L (ref 96–112)
CO2 SERPL-SCNC: 26 MMOL/L (ref 20–33)
CREAT SERPL-MCNC: 0.91 MG/DL (ref 0.5–1.4)
EOSINOPHIL # BLD AUTO: 0.2 K/UL (ref 0–0.51)
EOSINOPHIL NFR BLD: 1.9 % (ref 0–6.9)
ERYTHROCYTE [DISTWIDTH] IN BLOOD BY AUTOMATED COUNT: 44.7 FL (ref 35.9–50)
GLOBULIN SER CALC-MCNC: 2.3 G/DL (ref 1.9–3.5)
GLUCOSE BLD-MCNC: 135 MG/DL (ref 65–99)
GLUCOSE BLD-MCNC: 142 MG/DL (ref 65–99)
GLUCOSE BLD-MCNC: 170 MG/DL (ref 65–99)
GLUCOSE BLD-MCNC: 217 MG/DL (ref 65–99)
GLUCOSE SERPL-MCNC: 140 MG/DL (ref 65–99)
HCT VFR BLD AUTO: 35.1 % (ref 42–52)
HGB BLD-MCNC: 11.5 G/DL (ref 14–18)
HGB BLD-MCNC: 9.3 G/DL (ref 14–18)
IMM GRANULOCYTES # BLD AUTO: 0.25 K/UL (ref 0–0.11)
IMM GRANULOCYTES NFR BLD AUTO: 2.4 % (ref 0–0.9)
LYMPHOCYTES # BLD AUTO: 2.04 K/UL (ref 1–4.8)
LYMPHOCYTES NFR BLD: 19.2 % (ref 22–41)
MCH RBC QN AUTO: 29.2 PG (ref 27–33)
MCHC RBC AUTO-ENTMCNC: 32.8 G/DL (ref 33.7–35.3)
MCV RBC AUTO: 89.1 FL (ref 81.4–97.8)
MONOCYTES # BLD AUTO: 0.89 K/UL (ref 0–0.85)
MONOCYTES NFR BLD AUTO: 8.4 % (ref 0–13.4)
NEUTROPHILS # BLD AUTO: 7.2 K/UL (ref 1.82–7.42)
NEUTROPHILS NFR BLD: 67.7 % (ref 44–72)
NRBC # BLD AUTO: 0 K/UL
NRBC BLD-RTO: 0 /100 WBC
PLATELET # BLD AUTO: 136 K/UL (ref 164–446)
PMV BLD AUTO: 12.6 FL (ref 9–12.9)
POTASSIUM SERPL-SCNC: 3.2 MMOL/L (ref 3.6–5.5)
PROT SERPL-MCNC: 4.7 G/DL (ref 6–8.2)
RBC # BLD AUTO: 3.94 M/UL (ref 4.7–6.1)
SODIUM SERPL-SCNC: 138 MMOL/L (ref 135–145)
WBC # BLD AUTO: 10.6 K/UL (ref 4.8–10.8)

## 2020-12-09 PROCEDURE — 700105 HCHG RX REV CODE 258: Performed by: NURSE PRACTITIONER

## 2020-12-09 PROCEDURE — 700105 HCHG RX REV CODE 258: Performed by: FAMILY MEDICINE

## 2020-12-09 PROCEDURE — 74210 X-RAY XM PHRNX&/CRV ESOPH C+: CPT

## 2020-12-09 PROCEDURE — 700111 HCHG RX REV CODE 636 W/ 250 OVERRIDE (IP): Performed by: INTERNAL MEDICINE

## 2020-12-09 PROCEDURE — 700111 HCHG RX REV CODE 636 W/ 250 OVERRIDE (IP): Performed by: FAMILY MEDICINE

## 2020-12-09 PROCEDURE — C9113 INJ PANTOPRAZOLE SODIUM, VIA: HCPCS | Performed by: FAMILY MEDICINE

## 2020-12-09 PROCEDURE — 700102 HCHG RX REV CODE 250 W/ 637 OVERRIDE(OP): Performed by: FAMILY MEDICINE

## 2020-12-09 PROCEDURE — A9270 NON-COVERED ITEM OR SERVICE: HCPCS | Performed by: STUDENT IN AN ORGANIZED HEALTH CARE EDUCATION/TRAINING PROGRAM

## 2020-12-09 PROCEDURE — 85025 COMPLETE CBC W/AUTO DIFF WBC: CPT

## 2020-12-09 PROCEDURE — A9270 NON-COVERED ITEM OR SERVICE: HCPCS | Performed by: INTERNAL MEDICINE

## 2020-12-09 PROCEDURE — 99231 SBSQ HOSP IP/OBS SF/LOW 25: CPT | Performed by: FAMILY MEDICINE

## 2020-12-09 PROCEDURE — 700102 HCHG RX REV CODE 250 W/ 637 OVERRIDE(OP): Performed by: INTERNAL MEDICINE

## 2020-12-09 PROCEDURE — 85018 HEMOGLOBIN: CPT

## 2020-12-09 PROCEDURE — 700102 HCHG RX REV CODE 250 W/ 637 OVERRIDE(OP): Performed by: STUDENT IN AN ORGANIZED HEALTH CARE EDUCATION/TRAINING PROGRAM

## 2020-12-09 PROCEDURE — 36415 COLL VENOUS BLD VENIPUNCTURE: CPT

## 2020-12-09 PROCEDURE — 80053 COMPREHEN METABOLIC PANEL: CPT

## 2020-12-09 PROCEDURE — 770006 HCHG ROOM/CARE - MED/SURG/GYN SEMI*

## 2020-12-09 PROCEDURE — A9270 NON-COVERED ITEM OR SERVICE: HCPCS | Performed by: FAMILY MEDICINE

## 2020-12-09 PROCEDURE — 82962 GLUCOSE BLOOD TEST: CPT

## 2020-12-09 RX ORDER — PHENAZOPYRIDINE HYDROCHLORIDE 200 MG/1
200 TABLET, FILM COATED ORAL
Status: COMPLETED | OUTPATIENT
Start: 2020-12-09 | End: 2020-12-10

## 2020-12-09 RX ORDER — PANTOPRAZOLE SODIUM 40 MG/10ML
40 INJECTION, POWDER, LYOPHILIZED, FOR SOLUTION INTRAVENOUS 2 TIMES DAILY
Status: DISCONTINUED | OUTPATIENT
Start: 2020-12-09 | End: 2020-12-10

## 2020-12-09 RX ORDER — METOCLOPRAMIDE 10 MG/1
10 TABLET ORAL EVERY 8 HOURS PRN
Status: DISCONTINUED | OUTPATIENT
Start: 2020-12-09 | End: 2020-12-10 | Stop reason: HOSPADM

## 2020-12-09 RX ORDER — GLIPIZIDE 5 MG/1
5 TABLET ORAL 2 TIMES DAILY
Status: DISCONTINUED | OUTPATIENT
Start: 2020-12-09 | End: 2020-12-10 | Stop reason: HOSPADM

## 2020-12-09 RX ORDER — POTASSIUM CHLORIDE 20 MEQ/1
40 TABLET, EXTENDED RELEASE ORAL 2 TIMES DAILY
Status: DISCONTINUED | OUTPATIENT
Start: 2020-12-09 | End: 2020-12-10 | Stop reason: HOSPADM

## 2020-12-09 RX ADMIN — APIXABAN 5 MG: 5 TABLET, FILM COATED ORAL at 05:22

## 2020-12-09 RX ADMIN — SODIUM CHLORIDE, POTASSIUM CHLORIDE, SODIUM LACTATE AND CALCIUM CHLORIDE: 600; 310; 30; 20 INJECTION, SOLUTION INTRAVENOUS at 05:29

## 2020-12-09 RX ADMIN — TAMSULOSIN HYDROCHLORIDE 0.4 MG: 0.4 CAPSULE ORAL at 09:25

## 2020-12-09 RX ADMIN — METOCLOPRAMIDE 10 MG: 5 INJECTION, SOLUTION INTRAMUSCULAR; INTRAVENOUS at 08:18

## 2020-12-09 RX ADMIN — INSULIN HUMAN 2 UNITS: 100 INJECTION, SOLUTION PARENTERAL at 20:47

## 2020-12-09 RX ADMIN — MIRTAZAPINE 15 MG: 15 TABLET, FILM COATED ORAL at 20:47

## 2020-12-09 RX ADMIN — GLIPIZIDE 5 MG: 5 TABLET ORAL at 17:29

## 2020-12-09 RX ADMIN — METOPROLOL TARTRATE 25 MG: 25 TABLET, FILM COATED ORAL at 09:25

## 2020-12-09 RX ADMIN — PANTOPRAZOLE SODIUM 40 MG: 40 INJECTION, POWDER, LYOPHILIZED, FOR SOLUTION INTRAVENOUS at 18:33

## 2020-12-09 RX ADMIN — Medication 1000 UNITS: at 05:22

## 2020-12-09 RX ADMIN — PHENAZOPYRIDINE HYDROCHLORIDE 200 MG: 200 TABLET ORAL at 13:20

## 2020-12-09 RX ADMIN — PHENAZOPYRIDINE HYDROCHLORIDE 200 MG: 200 TABLET ORAL at 17:29

## 2020-12-09 RX ADMIN — POTASSIUM CHLORIDE 40 MEQ: 1500 TABLET, EXTENDED RELEASE ORAL at 17:29

## 2020-12-09 RX ADMIN — METOPROLOL TARTRATE 25 MG: 25 TABLET, FILM COATED ORAL at 17:28

## 2020-12-09 RX ADMIN — SODIUM CHLORIDE, POTASSIUM CHLORIDE, SODIUM LACTATE AND CALCIUM CHLORIDE: 600; 310; 30; 20 INJECTION, SOLUTION INTRAVENOUS at 21:47

## 2020-12-09 RX ADMIN — ATORVASTATIN CALCIUM 20 MG: 20 TABLET, FILM COATED ORAL at 20:47

## 2020-12-09 RX ADMIN — GLIPIZIDE 2.5 MG: 5 TABLET ORAL at 01:04

## 2020-12-09 RX ADMIN — OMEPRAZOLE 20 MG: 20 CAPSULE, DELAYED RELEASE ORAL at 05:22

## 2020-12-09 RX ADMIN — INSULIN HUMAN 1 UNITS: 100 INJECTION, SOLUTION PARENTERAL at 13:22

## 2020-12-09 ASSESSMENT — ENCOUNTER SYMPTOMS
DIAPHORESIS: 0
BLURRED VISION: 0
HEMOPTYSIS: 0
HEADACHES: 0
BRUISES/BLEEDS EASILY: 0
VOMITING: 0
NAUSEA: 0
NECK PAIN: 0
FALLS: 1
WEAKNESS: 1
DIARRHEA: 0
ABDOMINAL PAIN: 1
DIZZINESS: 0
COUGH: 0
MYALGIAS: 0
SEIZURES: 0

## 2020-12-09 NOTE — PROGRESS NOTES
"Received bedside report from Night RN this am. Awake and alert. Continues on LR at 150 ml/hr. He has dry heaves whenever his food arrives. He has poor intake. /61   Pulse (!) 122   Temp 36.6 °C (97.8 °F) (Temporal)   Resp 18   Ht 1.727 m (5' 8\")   Wt 77.3 kg (170 lb 6.7 oz)   SpO2 98%   BMI 25.91 kg/m²     "

## 2020-12-09 NOTE — PROGRESS NOTES
Assume care of pt at 0700. Report received from NOC RN. Pt is A/O x4. Pt denies pain. Pt is resting in bed. Bed in lowest and locked position, call light in reach, hourly rounding in place. Labs reviewed. Communication board updated. Will continue to monitor.     Pt continues to have intermittent nausea and vomiting. Barium swallow test ordered and to be done. No other complaints at this time. NANDO.

## 2020-12-09 NOTE — PROGRESS NOTES
Pt is AOx4. Having diarrhea. Up to commode, but felt weak after standing for a while to be cleaned up and returned to bed for the end of his clean-up. Given prn immodium. Denies pain.

## 2020-12-09 NOTE — PROGRESS NOTES
[facility] Medicine Daily Progress Note    Date of Service  12/9/2020    Chief Complaint  75 y.o. male admitted 12/2/2020 with ground-level fall, nausea, vomiting and diarrhea    Hospital Course  Mr. Vega is a 75-year-old male who presented to the emergency department on 12/2/2020 with complaints of fall.  He is unsure if he lost consciousness or not.  This was accompanied by nausea and vomiting x4 days which has subsequently caused poor p.o. intake and severe dehydration.  CT of the head done on admission showed no acute intracranial abnormalities.  A chest x-ray was performed and was significant only for bronchial inflammation indicating bronchitis.  A CT of the abdomen and pelvis without contrast was also performed and revealed fluid-filled distended colonic loops suggesting ileus and/or enteritis.  He was noted to be hypotensive while in the ED and was additionally found to have sepsis in addition to acute kidney injury.  He was subsequently started on IV fluid resuscitation and admitted to the hospital for further evaluation and treatment    Interval Problem Update  Reports ongoing nausea and watery diarrhea.  Check stool for C. difficile.  Denies any vomiting.  Patient is able to tolerate full liquid diet.  He denies any fevers or chills.  Potassium is low at 3 will replace orally.  Creatinine has improved to 2.21.  PT OT eval    12/5 continues to report nausea, vomiting and diarrhea. His abdomen is soft and nontender. No fevers or chills. Continue IV fluids. ADRIENNE is improving.     12/6 patient reports some improvement in his nausea however continues to have diarrhea.  His potassium was found to be low at 2.9.  Will replace orally.  Advance diet as tolerated to GI soft.  PT OT    12/7 Reports on going nausea. Check EKG to evaluate QTC, if less than 500 ok to use Zofran. Continue PT OT.   12/8: Sitting up in bed comfortable.  Tolerating p.o. intake but stated that his appetite is poor.  Abdominal pain much  improved.  No nausea or vomiting.  Complain of heartburn so PPI was added.  No acute distress noted.  No issues overnight per staff.  12/9: Resting in bed comfortably.  Complaining of solid diet being stuck in his chest.  No issues with liquids.  Vomited last night.  Complaining of dysuria.  Appetite still poor.  Has been afebrile and hemodynamically stable.  No issues overnight per staff.  Consultants/Specialty  None    Code Status  Full Code    Disposition  Pending SNF acceptance.    Review of Systems  Review of Systems   Constitutional: Positive for malaise/fatigue. Negative for diaphoresis.   HENT: Negative for hearing loss.    Eyes: Negative for blurred vision.   Respiratory: Negative for cough and hemoptysis.    Cardiovascular: Negative for chest pain.   Gastrointestinal: Positive for abdominal pain (Improving). Negative for diarrhea, nausea and vomiting.   Genitourinary: Negative for dysuria and urgency.   Musculoskeletal: Positive for falls. Negative for myalgias and neck pain.   Neurological: Positive for weakness. Negative for dizziness, seizures and headaches.   Endo/Heme/Allergies: Does not bruise/bleed easily.   Psychiatric/Behavioral: Negative for suicidal ideas.        Physical Exam  Temp:  [36.2 °C (97.2 °F)-36.7 °C (98 °F)] 36.7 °C (98 °F)  Pulse:  [105-123] 110  Resp:  [16-18] 17  BP: (100-140)/(58-79) 110/58  SpO2:  [93 %-99 %] 93 %    Physical Exam  Vitals signs and nursing note reviewed.   Constitutional:       Appearance: Normal appearance.   HENT:      Head: Normocephalic and atraumatic.      Nose: Nose normal.      Mouth/Throat:      Mouth: Mucous membranes are moist.   Eyes:      Extraocular Movements: Extraocular movements intact.      Pupils: Pupils are equal, round, and reactive to light.   Neck:      Musculoskeletal: Normal range of motion and neck supple.   Cardiovascular:      Rate and Rhythm: Normal rate and regular rhythm.      Pulses: Normal pulses.      Heart sounds: Normal heart  sounds.   Pulmonary:      Effort: Pulmonary effort is normal. No respiratory distress.      Breath sounds: Normal breath sounds.   Abdominal:      General: Bowel sounds are normal. There is no distension.      Palpations: Abdomen is soft.   Musculoskeletal: Normal range of motion.         General: No swelling or tenderness.      Comments: Swelling of the left upper extremity including hand.  No DVT on Doppler study.   Lymphadenopathy:      Cervical: No cervical adenopathy.   Skin:     General: Skin is warm.      Coloration: Skin is not jaundiced.   Neurological:      General: No focal deficit present.      Mental Status: He is alert and oriented to person, place, and time.      Cranial Nerves: No cranial nerve deficit.   Psychiatric:         Mood and Affect: Mood normal.         Fluids    Intake/Output Summary (Last 24 hours) at 12/9/2020 1414  Last data filed at 12/9/2020 1041  Gross per 24 hour   Intake 480 ml   Output 1850 ml   Net -1370 ml       Laboratory  Recent Labs     12/07/20  0856 12/08/20  0716 12/09/20  0957   WBC 11.2* 10.3 10.6   RBC 4.18* 3.47* 3.94*   HEMOGLOBIN 12.3* 10.4* 11.5*   HEMATOCRIT 37.9* 30.7* 35.1*   MCV 90.7 88.5 89.1   MCH 29.4 30.0 29.2   MCHC 32.5* 33.9 32.8*   RDW 46.4 44.6 44.7   PLATELETCT 175 152* 136*   MPV 12.3 12.7 12.6     Recent Labs     12/07/20  0856 12/08/20  0716 12/09/20  0957   SODIUM 143 139 138   POTASSIUM 3.4* 3.5* 3.2*   CHLORIDE 107 108 102   CO2 24 27 26   GLUCOSE 192* 213* 140*   BUN 20 17 13   CREATININE 0.94 0.81 0.91   CALCIUM 8.0* 7.1* 7.4*                   Imaging  US-EXTREMITY VENOUS UPPER UNILAT LEFT   Final Result      US-RENAL   Final Result      No hydronephrosis is identified      CT-ABDOMEN-PELVIS W/O   Final Result         1.  Fluid-filled distended colonic loops suggests ileus and/or enteritis.   2.  Fat-containing bilateral inguinal hernias   3.  Atherosclerosis and atherosclerotic coronary artery disease.      DX-CHEST-PORTABLE (1 VIEW)   Final  Result         1.  No focal infiltrates.   2.  Perihilar interstitial prominence and bronchial wall cuffing, appearance suggests changes of underlying bronchial inflammation, consider bronchitis.      CT-HEAD W/O   Final Result         1.  No acute intracranial abnormality is identified, there are nonspecific white matter changes, commonly associated with small vessel ischemic disease.  Associated mild cerebral atrophy is noted.   2.  Atherosclerosis.      DX-ESOPH. FLUORO (BARIUM SWALLOW)    (Results Pending)        Assessment/Plan  * Sepsis (HCC)- (present on admission)  Assessment & Plan  This is Severe Sepsis Present on admission  SIRS criteria identified on my evaluation include: Tachycardia, with heart rate greater than 90 BPM, Tachypnea, with respirations greater than 20 per minute and Leukocytosis, with WBC greater than 12,000  Source of infection is suspected intra-abdominal  Clinical indicators of end organ dysfunction include Creatinine >2.0 (Without ESRD or CKD)  Sepsis protocol initiated  Fluid resuscitation ordered per protocol  IV antibiotics as appropriate for source of sepsis  Reassessment: I have reassessed the patient's hemodynamic status  End organ dysfunction include(s):  Acute kidney failure      Enteritis- (present on admission)  Assessment & Plan  Continue with IV fluid resuscitation, analgesics for pain and advance to soft diet as tolerated  C diff negative  Completed a course of antibiotics     ADRIENNE (acute kidney injury) (HCC)- (present on admission)  Assessment & Plan  Likely secondary to sepsis compounded with hypotension leading to poor renal perfusion.  Monitor urine output and consider bladder scan if an uric/oliguric  Resolved    Dysphagia- (present on admission)  Assessment & Plan  To solids.  We will check barium swallow study.    Left upper extremity swelling- (present on admission)  Assessment & Plan  Venous Doppler study negative for DVT.    Paroxysmal atrial fibrillation (HCC)-  (present on admission)  Assessment & Plan  Continue Eliquis and Coreg.    Debility- (present on admission)  Assessment & Plan  PT OT    Hypokalemia- (present on admission)  Assessment & Plan  Check mag  Replace with p.o. potassium  Monitor BMP    NSTEMI (non-ST elevated myocardial infarction) (HCC)- (present on admission)  Assessment & Plan  Likely NSTEMI type II from demand ischemia  Trend troponins  Twelve-lead EKG ordered and pending  No chest pain on physical examination and likely component of ADRIENNE       VTE prophylaxis: Eliquis

## 2020-12-09 NOTE — CARE PLAN
Problem: Mobility  Goal: Risk for activity intolerance will decrease  Outcome: PROGRESSING SLOWER THAN EXPECTED  Note: PT tolerates activity poorly. Generalized weakness     Problem: Communication  Goal: The ability to communicate needs accurately and effectively will improve  Outcome: MET  Intervention: Collaborate with speech therapy  Note: Pt able to use call light appropriately to communicate needs

## 2020-12-09 NOTE — CARE PLAN
Problem: Safety  Goal: Will remain free from falls  Outcome: PROGRESSING AS EXPECTED  Note: Pt calls to have staff assist him out of bed and to restroom. Wearing non skid socks.      Problem: Mobility  Goal: Risk for activity intolerance will decrease  Outcome: PROGRESSING AS EXPECTED  Note: Pt gets out of bed to use restroom and taking daily walks around the unit.

## 2020-12-09 NOTE — DISCHARGE PLANNING
Agency/Facility Name: [facility]   Referral sent per Choice form @ [phone]  Agency/Facility Name: [facility]   Spoke To: Maylin   Outcome: PT accepted with a bed avail today per Maylin     Ag[facility]   Spoke To: Yary   Outcome: Referral under review     Agency/Facility Name: [facility]   Outcome: Left vmail for admissions     Agency/Facility Name: [facility]   Spoke To: Jovanni   Outcome: Referral under review     Agency/Facility Name: [facility]   Spoke To: Clotilde   Outcome: Referral under review      -[phone]  Agency/Facility Name: [facility]   Spoke To: Saleem   Outcome: PT will need to provide

## 2020-12-10 VITALS
DIASTOLIC BLOOD PRESSURE: 85 MMHG | SYSTOLIC BLOOD PRESSURE: 137 MMHG | RESPIRATION RATE: 16 BRPM | HEIGHT: 68 IN | BODY MASS INDEX: 25.83 KG/M2 | TEMPERATURE: 97.8 F | HEART RATE: 100 BPM | OXYGEN SATURATION: 100 % | WEIGHT: 170.42 LBS

## 2020-12-10 PROBLEM — A41.9 SEPSIS (HCC): Status: RESOLVED | Noted: 2020-12-03 | Resolved: 2020-12-10

## 2020-12-10 PROBLEM — K52.9 ENTERITIS: Status: RESOLVED | Noted: 2020-12-03 | Resolved: 2020-12-10

## 2020-12-10 PROBLEM — I21.4 NSTEMI (NON-ST ELEVATED MYOCARDIAL INFARCTION) (HCC): Status: RESOLVED | Noted: 2020-12-03 | Resolved: 2020-12-10

## 2020-12-10 PROBLEM — N17.9 AKI (ACUTE KIDNEY INJURY) (HCC): Status: RESOLVED | Noted: 2020-12-03 | Resolved: 2020-12-10

## 2020-12-10 LAB
ALBUMIN SERPL BCP-MCNC: 2 G/DL (ref 3.2–4.9)
ALBUMIN/GLOB SERPL: 1 G/DL
ALP SERPL-CCNC: 55 U/L (ref 30–99)
ALT SERPL-CCNC: 11 U/L (ref 2–50)
ANION GAP SERPL CALC-SCNC: 5 MMOL/L (ref 7–16)
AST SERPL-CCNC: 22 U/L (ref 12–45)
BILIRUB SERPL-MCNC: 0.4 MG/DL (ref 0.1–1.5)
BUN SERPL-MCNC: 11 MG/DL (ref 8–22)
CALCIUM SERPL-MCNC: 7 MG/DL (ref 8.5–10.5)
CHLORIDE SERPL-SCNC: 103 MMOL/L (ref 96–112)
CO2 SERPL-SCNC: 28 MMOL/L (ref 20–33)
CREAT SERPL-MCNC: 0.92 MG/DL (ref 0.5–1.4)
ERYTHROCYTE [DISTWIDTH] IN BLOOD BY AUTOMATED COUNT: 44.4 FL (ref 35.9–50)
FERRITIN SERPL-MCNC: 1002 NG/ML (ref 22–322)
GLOBULIN SER CALC-MCNC: 2 G/DL (ref 1.9–3.5)
GLUCOSE BLD-MCNC: 104 MG/DL (ref 65–99)
GLUCOSE BLD-MCNC: 66 MG/DL (ref 65–99)
GLUCOSE SERPL-MCNC: 140 MG/DL (ref 65–99)
HCT VFR BLD AUTO: 29 % (ref 42–52)
HGB BLD-MCNC: 9.1 G/DL (ref 14–18)
HGB BLD-MCNC: 9.4 G/DL (ref 14–18)
HGB BLD-MCNC: 9.5 G/DL (ref 14–18)
HGB RETIC QN AUTO: 32.5 PG/CELL (ref 29–35)
IMM RETICS NFR: 23.7 % (ref 9.3–17.4)
IRON SATN MFR SERPL: 22 % (ref 15–55)
IRON SERPL-MCNC: 27 UG/DL (ref 50–180)
MAGNESIUM SERPL-MCNC: 1 MG/DL (ref 1.5–2.5)
MCH RBC QN AUTO: 29.1 PG (ref 27–33)
MCHC RBC AUTO-ENTMCNC: 32.8 G/DL (ref 33.7–35.3)
MCV RBC AUTO: 88.7 FL (ref 81.4–97.8)
PLATELET # BLD AUTO: 147 K/UL (ref 164–446)
PMV BLD AUTO: 12.1 FL (ref 9–12.9)
POTASSIUM SERPL-SCNC: 3.5 MMOL/L (ref 3.6–5.5)
PROT SERPL-MCNC: 4 G/DL (ref 6–8.2)
RBC # BLD AUTO: 3.27 M/UL (ref 4.7–6.1)
RETICS # AUTO: 0.04 M/UL (ref 0.04–0.06)
RETICS/RBC NFR: 1.3 % (ref 0.8–2.1)
SODIUM SERPL-SCNC: 136 MMOL/L (ref 135–145)
TIBC SERPL-MCNC: 121 UG/DL (ref 250–450)
UIBC SERPL-MCNC: 94 UG/DL (ref 110–370)
WBC # BLD AUTO: 8 K/UL (ref 4.8–10.8)

## 2020-12-10 PROCEDURE — 700102 HCHG RX REV CODE 250 W/ 637 OVERRIDE(OP): Performed by: STUDENT IN AN ORGANIZED HEALTH CARE EDUCATION/TRAINING PROGRAM

## 2020-12-10 PROCEDURE — 97110 THERAPEUTIC EXERCISES: CPT | Mod: CQ

## 2020-12-10 PROCEDURE — 36415 COLL VENOUS BLD VENIPUNCTURE: CPT

## 2020-12-10 PROCEDURE — 82728 ASSAY OF FERRITIN: CPT

## 2020-12-10 PROCEDURE — 83735 ASSAY OF MAGNESIUM: CPT

## 2020-12-10 PROCEDURE — 82962 GLUCOSE BLOOD TEST: CPT

## 2020-12-10 PROCEDURE — 85027 COMPLETE CBC AUTOMATED: CPT

## 2020-12-10 PROCEDURE — 83540 ASSAY OF IRON: CPT

## 2020-12-10 PROCEDURE — A9270 NON-COVERED ITEM OR SERVICE: HCPCS | Performed by: FAMILY MEDICINE

## 2020-12-10 PROCEDURE — 85046 RETICYTE/HGB CONCENTRATE: CPT

## 2020-12-10 PROCEDURE — 85018 HEMOGLOBIN: CPT

## 2020-12-10 PROCEDURE — 700102 HCHG RX REV CODE 250 W/ 637 OVERRIDE(OP): Performed by: FAMILY MEDICINE

## 2020-12-10 PROCEDURE — 83550 IRON BINDING TEST: CPT

## 2020-12-10 PROCEDURE — A9270 NON-COVERED ITEM OR SERVICE: HCPCS | Performed by: STUDENT IN AN ORGANIZED HEALTH CARE EDUCATION/TRAINING PROGRAM

## 2020-12-10 PROCEDURE — 700102 HCHG RX REV CODE 250 W/ 637 OVERRIDE(OP): Performed by: INTERNAL MEDICINE

## 2020-12-10 PROCEDURE — C9113 INJ PANTOPRAZOLE SODIUM, VIA: HCPCS | Performed by: FAMILY MEDICINE

## 2020-12-10 PROCEDURE — 80053 COMPREHEN METABOLIC PANEL: CPT

## 2020-12-10 PROCEDURE — 99239 HOSP IP/OBS DSCHRG MGMT >30: CPT | Performed by: FAMILY MEDICINE

## 2020-12-10 PROCEDURE — 700111 HCHG RX REV CODE 636 W/ 250 OVERRIDE (IP): Performed by: FAMILY MEDICINE

## 2020-12-10 PROCEDURE — 97530 THERAPEUTIC ACTIVITIES: CPT | Mod: CQ

## 2020-12-10 PROCEDURE — A9270 NON-COVERED ITEM OR SERVICE: HCPCS | Performed by: INTERNAL MEDICINE

## 2020-12-10 RX ORDER — SUCRALFATE 1 G/1
1 TABLET ORAL EVERY 6 HOURS
Status: DISCONTINUED | OUTPATIENT
Start: 2020-12-10 | End: 2020-12-10 | Stop reason: HOSPADM

## 2020-12-10 RX ORDER — SUCRALFATE 1 G/1
1 TABLET ORAL EVERY 6 HOURS
Qty: 120 TAB | Refills: 3 | Status: ON HOLD | OUTPATIENT
Start: 2020-12-10 | End: 2021-01-04 | Stop reason: SDUPTHER

## 2020-12-10 RX ORDER — ASPIRIN 81 MG/1
81 TABLET ORAL DAILY
Qty: 30 TAB | Refills: 0 | Status: ON HOLD | OUTPATIENT
Start: 2020-12-10 | End: 2020-12-29

## 2020-12-10 RX ORDER — TAMSULOSIN HYDROCHLORIDE 0.4 MG/1
0.4 CAPSULE ORAL
Qty: 30 CAP | Refills: 0 | Status: ON HOLD | OUTPATIENT
Start: 2020-12-11 | End: 2021-01-04 | Stop reason: SDUPTHER

## 2020-12-10 RX ORDER — POTASSIUM CHLORIDE 20 MEQ/1
40 TABLET, EXTENDED RELEASE ORAL 2 TIMES DAILY
Qty: 8 TAB | Refills: 0 | Status: SHIPPED | OUTPATIENT
Start: 2020-12-10 | End: 2020-12-12

## 2020-12-10 RX ORDER — OMEPRAZOLE 20 MG/1
40 CAPSULE, DELAYED RELEASE ORAL 2 TIMES DAILY
Status: DISCONTINUED | OUTPATIENT
Start: 2020-12-10 | End: 2020-12-10 | Stop reason: HOSPADM

## 2020-12-10 RX ORDER — MIRTAZAPINE 15 MG/1
15 TABLET, FILM COATED ORAL
Qty: 30 TAB | Refills: 0 | Status: ON HOLD | OUTPATIENT
Start: 2020-12-10 | End: 2021-01-04 | Stop reason: SDUPTHER

## 2020-12-10 RX ORDER — MAGNESIUM SULFATE HEPTAHYDRATE 40 MG/ML
4 INJECTION, SOLUTION INTRAVENOUS ONCE
Status: COMPLETED | OUTPATIENT
Start: 2020-12-10 | End: 2020-12-10

## 2020-12-10 RX ADMIN — PANTOPRAZOLE SODIUM 40 MG: 40 INJECTION, POWDER, LYOPHILIZED, FOR SOLUTION INTRAVENOUS at 05:03

## 2020-12-10 RX ADMIN — OMEPRAZOLE 40 MG: 20 CAPSULE, DELAYED RELEASE ORAL at 09:59

## 2020-12-10 RX ADMIN — MAGNESIUM SULFATE IN WATER 4 G: 40 INJECTION, SOLUTION INTRAVENOUS at 12:36

## 2020-12-10 RX ADMIN — TAMSULOSIN HYDROCHLORIDE 0.4 MG: 0.4 CAPSULE ORAL at 09:59

## 2020-12-10 RX ADMIN — Medication 1000 UNITS: at 05:03

## 2020-12-10 RX ADMIN — GLIPIZIDE 5 MG: 5 TABLET ORAL at 05:04

## 2020-12-10 RX ADMIN — POTASSIUM CHLORIDE 40 MEQ: 1500 TABLET, EXTENDED RELEASE ORAL at 05:04

## 2020-12-10 RX ADMIN — SUCRALFATE 1 G: 1 TABLET ORAL at 12:45

## 2020-12-10 RX ADMIN — PHENAZOPYRIDINE HYDROCHLORIDE 200 MG: 200 TABLET ORAL at 09:59

## 2020-12-10 RX ADMIN — METOPROLOL TARTRATE 25 MG: 25 TABLET, FILM COATED ORAL at 05:04

## 2020-12-10 ASSESSMENT — COGNITIVE AND FUNCTIONAL STATUS - GENERAL
MOVING TO AND FROM BED TO CHAIR: A LITTLE
WALKING IN HOSPITAL ROOM: A LITTLE
CLIMB 3 TO 5 STEPS WITH RAILING: A LOT
SUGGESTED CMS G CODE MODIFIER MOBILITY: CK
TURNING FROM BACK TO SIDE WHILE IN FLAT BAD: A LITTLE
STANDING UP FROM CHAIR USING ARMS: A LITTLE
MOVING FROM LYING ON BACK TO SITTING ON SIDE OF FLAT BED: A LITTLE
MOBILITY SCORE: 17

## 2020-12-10 ASSESSMENT — GAIT ASSESSMENTS: GAIT LEVEL OF ASSIST: UNABLE TO PARTICIPATE

## 2020-12-10 NOTE — DISCHARGE INSTRUCTIONS
Discharge Instructions    Discharged to home by [facility] with escort. Discharged via wheelchair, hospital escort: Yes.  Special equipment needed: Not Applicable    Be sure to schedule a follow-up appointment with your primary care doctor or any specialists as instructed.     Discharge Plan:   Diet Plan: Discussed  Activity Level: Discussed  Confirmed Follow up Appointment: Patient to Call and Schedule Appointment  Confirmed Symptoms Management: Discussed  Medication Reconciliation Updated: Yes  Influenza Vaccine Indication: Patient Refuses    I understand that a diet low in cholesterol, fat, and sodium is recommended for good health. Unless I have been given specific instructions below for another diet, I accept this instruction as my diet prescription.   Other diet: Full liquid    Special Instructions: None    · Is patient discharged on Warfarin / Coumadin?   No     Depression / Suicide Risk    As you are discharged from this [facility], it is important to learn how to keep safe from harming yourself.    Recognize the warning signs:  · Abrupt changes in personality, positive or negative- including increase in energy   · Giving away possessions  · Change in eating patterns- significant weight changes-  positive or negative  · Change in sleeping patterns- unable to sleep or sleeping all the time   · Unwillingness or inability to communicate  · Depression  · Unusual sadness, discouragement and loneliness  · Talk of wanting to die  · Neglect of personal appearance   · Rebelliousness- reckless behavior  · Withdrawal from people/activities they love  · Confusion- inability to concentrate     If you or a loved one observes any of these behaviors or has concerns about self-harm, here's what you can do:  · Talk about it- your feelings and reasons for harming yourself  · Remove any means that you might use to hurt yourself (examples: pills, rope, extension cords, firearm)  · Get professional help from the  community (Mental Health, Substance Abuse, psychological counseling)  · Do not be alone:Call your Safe Contact- someone whom you trust who will be there for you.  · Call your local CRISIS HOTLINE 621-8421 or 954-363-9198  · Call your local Children's Mobile Crisis Response Team [place] (932) 102-3262 or www.Med.ly  · Call the toll free National Suicide Prevention Hotlines   · National Suicide Prevention Lifeline 501-542-MPZX (2880)  · National Hope Line Network 800-SUICIDE (335-5741)

## 2020-12-10 NOTE — THERAPY
Physical Therapy   Daily Treatment     Patient Name: Lakhwinder Vega  Age:  75 y.o., Sex:  male  Medical Record #: [id number]  Today's Date: 12/10/2020     Precautions: Fall Risk    Assessment    Pt pleasant, delayed responses requiring extra time to process with mobility. He required Tu and bed features to reach EOB. Upon EOB discussed and educated on pursed lip breathing, pt reporting fatigued. Started with seated exercises for strengthening as pt has decreased mobility since previous tx session. He performed exercises with good carrythrough and rest breaks in between.  Pt reporting starting to feel lightheaded. Rn arrived and checked glucose at 66. Pt continued to report lightheaded provided pt with apple juice and assisted with Tu to return to supine. Unable to perform transfers or gait today due to medical reasons. Educated mobilizing at least to chair while here. Will continue to follow while in house.     Plan    Continue current treatment plan.    DC Equipment Recommendations: Unable to determine at this time  Discharge Recommendations: Recommend post-acute placement for additional physical therapy services prior to discharge home         12/10/20 0950   Gait Analysis   Gait Level Of Assist Unable to Participate   Comments symptomatic with low blood sugar.    Bed Mobility    Supine to Sit Minimal Assist   Sit to Supine Minimal Assist   Scooting Supervised   Rolling Minimal Assist to Rt.;Minimum Assist to Lt.   Skilled Intervention Sequencing;Verbal Cuing   Comments extra time and utilized bed features. He required Tu for due to decreased strength at this time.    Functional Mobility   Sit to Stand Unable to Participate   Short Term Goals    Short Term Goal # 1 Pt will perform gait x150' with FWW at spvin 6tx to improve functional independence.    Goal Outcome # 1 goal not met   Short Term Goal # 2 Pt will ascend/descend x10 stairs at spv in 6tx to improve ability to enter/leave his home.    Goal Outcome  # 2 Goal not met

## 2020-12-10 NOTE — PROGRESS NOTES
Called report to Diaz BELTRÁN at [facility]. Pt with transport team to be transported to [facility] via [facility] van. PIV removed tip in tact. Pt belongs with him including: cell phone, wallet, clothes, discharge instructions. Pt is A/O x4. No needs at this time.

## 2020-12-10 NOTE — DISCHARGE SUMMARY
Discharge Summary    CHIEF COMPLAINT ON ADMISSION  Chief Complaint   Patient presents with   • T-5000 FALL       Reason for Admission  ems     CODE STATUS  Full Code    HPI & HOSPITAL COURSE  Mr. Vega is a 75-year-old male who presented to the emergency department on 12/2/2020 with complaints of fall.  He is unsure if he lost consciousness or not.  This was accompanied by nausea and vomiting x4 days which has subsequently caused poor p.o. intake and severe dehydration.  CT of the head done on admission showed no acute intracranial abnormalities.  A chest x-ray was performed and was significant only for bronchial inflammation indicating bronchitis.  A CT of the abdomen and pelvis without contrast was also performed and revealed fluid-filled distended colonic loops suggesting ileus and/or enteritis.  He was noted to be hypotensive while in the ED and was additionally found to have sepsis in addition to acute kidney injury.  He was subsequently started on IV fluid resuscitation and admitted to the hospital for further evaluation and treatment.  Patient completed a course of antibiotics during this hospital stay.  Sepsis resolved.  Kidney functions gradually improved with a course of hospital stay as well.  Also developed left upper extremity swelling at the peripheral IV line site.  Doppler study was done and negative for any DVT.  Patient continued home dose Eliquis for presumed paroxysmal A. fib.  Electrolytes imbalance was addressed during this hospital stay.  Patient was complaining of dysphagia to solids and barium swallow study was done which was unremarkable.  Apparently [facility] which showed esophagitis and PUD (no records were available).  At that time H pylori antigen came back positive and patient completed a course of triple therapy indicated by his gastroenterologist.  Carafate was added on this hospital stay.  The night prior to discharge she had one episode of  melena.  H&H trended down slightly.  Eliquis was discontinued and patient was placed on IV PPI.  No further episodes of melena noted and is hemoglobin continued to be stable.  Patient was advised to follow-up with his cardiologist regarding anticoagulation and was placed on baby aspirin for now.  Noted to have urinary retention and Porter catheter was placed during this hospital stay.  Upon discharge recommendations were made for SNF staff to remove Porter catheter and attempt voiding trials.  Has been receiving Flomax while in hospital.  Evaluated by physical and patient therapy recommended SNF placement.  Referral was sent and patient was accepted.  Was hemodynamically and clinically stable.  Was cleared for discharge from medical standpoint.            Therefore, he is discharged in fair and stable condition to skilled nursing facility.    The patient met 2-midnight criteria for an inpatient stay at the time of discharge.      FOLLOW UP ITEMS POST DISCHARGE  Follow-up with PCP at SNF as per recommendations  [facility] as [facility]  [facility] as per [facility].    DISCHARGE DIAGNOSES  Principal Problem (Resolved):    Sepsis (HCC) POA: Yes  Active Problems:    Hypokalemia POA: Yes    Debility POA: Yes    Paroxysmal atrial fibrillation (HCC) POA: Yes    Left upper extremity swelling POA: Yes    Dysphagia POA: Yes  Resolved Problems:    Enteritis POA: Yes    ADRIENNE (acute kidney injury) ([facility]) POA: Yes    NSTEMI (non-ST elevated myocardial infarction) ([facility]) POA: Yes      FOLLOW UP  No future appointments.  No follow-up provider specified.    MEDICATIONS ON DISCHARGE     Medication List      START taking these medications      Instructions   aspirin 81 MG EC tablet   Take 1 Tab by mouth every day.  Dose: 81 mg     insulin regular 100 Unit/mL Soln  Commonly known as: HumuLIN R   Inject 1-6 Units under the skin 4 Times a Day,Before Meals and at Bedtime.  Dose: 1-6 Units      metoprolol 25 MG Tabs  Commonly known as: LOPRESSOR   Take 1 Tab by mouth 2 Times a Day.  Dose: 25 mg     mirtazapine 15 MG Tabs  Commonly known as: Remeron   Take 1 Tab by mouth every bedtime.  Dose: 15 mg     potassium chloride SA 20 MEQ Tbcr  Commonly known as: Kdur   Take 2 Tabs by mouth 2 Times a Day for 2 days.  Dose: 40 mEq     sucralfate 1 GM Tabs  Commonly known as: CARAFATE   Take 1 Tab by mouth every 6 hours.  Dose: 1 g     tamsulosin 0.4 MG capsule  Start taking on: December 11, 2020  Commonly known as: FLOMAX   Take 1 Cap by mouth 1/2 hour after breakfast.  Dose: 0.4 mg        CONTINUE taking these medications      Instructions   Alogliptin Benzoate 12.5 MG Tabs   Take 12.5 mg by mouth every day.  Dose: 12.5 mg     atorvastatin 40 MG Tabs  Commonly known as: LIPITOR   Take 40 mg by mouth every evening.  Dose: 40 mg     glipiZIDE 5 MG Tabs  Commonly known as: GLUCOTROL   Take 5 mg by mouth 2 times a day.  Dose: 5 mg     metFORMIN 500 MG Tabs  Commonly known as: GLUCOPHAGE   Take 500 mg by mouth 2 times a day, with meals.  Dose: 500 mg     metroNIDAZOLE 500 MG Tabs  Commonly known as: FLAGYL   Take 500 mg by mouth 2 Times a Day. 14 day course  Dose: 500 mg     pantoprazole 40 MG Tbec  Commonly known as: PROTONIX   Take 40 mg by mouth 2 times a day.  Dose: 40 mg     tetracycline 250 MG Caps  Commonly known as: SUMYCIN   Take 250 mg by mouth 2 times a day. 14 day course  Dose: 250 mg     Vitamin B12 1000 MCG Tbcr   Take 1,000 mcg by mouth every day.  Dose: 1,000 mcg     vitamin D 1000 Unit (25 mcg) Tabs  Commonly known as: cholecalciferol   Take 1,000 Units by mouth every day.  Dose: 1,000 Units        STOP taking these medications    carvedilol 25 MG Tabs  Commonly known as: COREG     Eliquis 5mg Tabs  Generic drug: apixaban     lisinopril-hydrochlorothiazide 20-25 MG per tablet  Commonly known as: PRINZIDE            Allergies  Allergies   Allergen Reactions   • Pcn [Penicillins] Anaphylaxis        DIET  Orders Placed This Encounter   Procedures   • Diet Order Diet: Full Liquid; Tray Modifications (optional): Small Meals     Standing Status:   Standing     Number of Occurrences:   1     Order Specific Question:   Diet:     Answer:   Full Liquid [11]     Order Specific Question:   Tray Modifications (optional)     Answer:   Small Meals       ACTIVITY  As tolerated.  Weight bearing as tolerated    LINES, DRAINS, AND WOUNDS  This is an automated list. Peripheral IVs will be removed prior to discharge.  Peripheral IV 12/03/20 18 G Left Upper arm (Active)   Site Assessment Clean;Dry;Intact 12/10/20 0800   Dressing Type Transparent 12/10/20 0800   Line Status Infusing 12/10/20 0800   Dressing Status Clean;Dry;Intact 12/10/20 0800   Dressing Intervention N/A 12/10/20 0800   Dressing Change Due 12/12/20 12/08/20 2030   Date Primary Tubing Changed 12/07/20 12/06/20 0900   Date Secondary Tubing Changed 12/03/20 12/04/20 2016   NEXT Primary Tubing Change  12/12/20 12/10/20 0800   NEXT Secondary Tubing Change  12/04/20 12/04/20 2016   Infiltration Grading ([facility] 0 12/10/20 0800   Phlebitis Scale ([facility] Only) 0 12/10/20 0800     Urethral Catheter (Active)   Site Assessment Clean;Skin intact 12/10/20 0800   Collection Container Standard drainage bag 12/10/20 0800   Urinary Catheter Care Drainage Bag Below Bladder Level and Not on Floor;Drainage Tubing Properly Secured 12/10/20 0800   Securement Method Securing device (Describe) 12/10/20 0800   Output (mL) 250 mL 12/10/20 0800         Peripheral IV 12/03/20 18 G Left Upper arm (Active)   Site Assessment Clean;Dry;Intact 12/10/20 0800   Dressing Type Transparent 12/10/20 0800   Line Status Infusing 12/10/20 0800   Dressing Status Clean;Dry;Intact 12/10/20 0800   Dressing Intervention N/A 12/10/20 0800   Dressing Change Due 12/12/20 12/08/20 2030   Date Primary Tubing Changed 12/07/20 12/06/20 0900   Date Secondary Tubing Changed 12/03/20 12/04/20 2016   NEXT  Primary Tubing Change  12/12/20 12/10/20 0800   NEXT Secondary Tubing Change  12/04/20 12/04/20 2016   Infiltration Grading ([facility]) 0 12/10/20 0800   Phlebitis Scale ([facility] Only) 0 12/10/20 0800           Urethral Catheter (Active)   Site Assessment Clean;Skin intact 12/10/20 0800   Collection Container Standard drainage bag 12/10/20 0800   Urinary Catheter Care Drainage Bag Below Bladder Level and Not on Floor;Drainage Tubing Properly Secured 12/10/20 0800   Securement Method Securing device (Describe) 12/10/20 0800   Output (mL) 250 mL 12/10/20 0800        MENTAL STATUS ON TRANSFER  Level of Consciousness: Alert  Orientation : Oriented x 4  Speech: Speech Clear    CONSULTATIONS  None    PROCEDURES  None    LABORATORY  Lab Results   Component Value Date    SODIUM 136 12/10/2020    POTASSIUM 3.5 (L) 12/10/2020    CHLORIDE 103 12/10/2020    CO2 28 12/10/2020    GLUCOSE 140 (H) 12/10/2020    BUN 11 12/10/2020    CREATININE 0.92 12/10/2020        Lab Results   Component Value Date    WBC 10.6 12/09/2020    HEMOGLOBIN 9.4 (L) 12/10/2020    HEMATOCRIT 35.1 (L) 12/09/2020    PLATELETCT 136 (L) 12/09/2020        Total time of the discharge process exceeds 34 minutes.

## 2020-12-10 NOTE — CARE PLAN
Problem: Safety  Goal: Will remain free from injury  Outcome: PROGRESSING AS EXPECTED  Goal: Will remain free from falls  Outcome: PROGRESSING AS EXPECTED     Problem: Skin Integrity  Goal: Risk for impaired skin integrity will decrease  Outcome: PROGRESSING AS EXPECTED     Problem: Pain Management  Goal: Pain level will decrease to patient's comfort goal  Outcome: PROGRESSING AS EXPECTED     Patient AxO x4  Respirations normal and unlabored.   Observed patient turning self from side to side without prompting. Bottom pink but blanching. Education given on turning every 2 hours to prevent skin breakdown.   Patient has had no complaints of nausea or abdominal pain so far this shift.   Fall risk protocol in place. Bed locked and in lowest position. Non-skid socks and bed alarm on.  Rounded on hourly. Call light with in reach.

## 2020-12-10 NOTE — DISCHARGE PLANNING
Anticipated Discharge Disposition:   · SNF-[facility]    Action:   · Pt to discharge to [facility] today, [facility]  · PASRR Completed [id number]  · COBRA/transfer packet completed. LSW met with pt at bedside to provide update and obtain signature for COBRA. LSW answered all questions pt had     Barriers to Discharge:   · None    Plan:   ·

## 2020-12-10 NOTE — DISCHARGE PLANNING
Agency/Facility Name: [facility]   Spoke To: Maylin   Outcome: Per Maylin, beds avail today.  Pending medical clearance     -1154   Agency/Facility Name: [facility]   Spoke To: Maylin    Outcome: Per Maylin, PT accepted and can go today     -1221  Received Transport Form @ 1157  Spoke to [place] @ [facility]    Transport is scheduled for 12/28 @1400 going to [facility].    JEREL Lowry notified

## 2020-12-10 NOTE — PROGRESS NOTES
Assume care of pt at 0700. Report received from NOC RN. Pt is A/O x4. Denies pain this morning. Pt is resting in bed. Bed in lowest and locked position, call light in reach, hourly rounding in place. Labs reviewed. Communication board updated. Will continue to monitor.       Denies any nausea/vomiting this morning. Continues to complain pain when swallowing whole food/pills. Continues to have mild burning with urination. Will notify MD.

## 2020-12-24 ENCOUNTER — HOSPITAL ENCOUNTER (INPATIENT)
Facility: MEDICAL CENTER | Age: 75
LOS: 6 days | DRG: 811 | End: 2020-12-30
Attending: EMERGENCY MEDICINE | Admitting: STUDENT IN AN ORGANIZED HEALTH CARE EDUCATION/TRAINING PROGRAM
Payer: COMMERCIAL

## 2020-12-24 DIAGNOSIS — D64.9 ANEMIA, UNSPECIFIED TYPE: ICD-10-CM

## 2020-12-24 DIAGNOSIS — K92.2 GASTROINTESTINAL HEMORRHAGE, UNSPECIFIED GASTROINTESTINAL HEMORRHAGE TYPE: ICD-10-CM

## 2020-12-24 PROBLEM — Z79.4 TYPE 2 DIABETES MELLITUS, WITH LONG-TERM CURRENT USE OF INSULIN (HCC): Status: ACTIVE | Noted: 2020-12-24

## 2020-12-24 PROBLEM — E11.9 TYPE 2 DIABETES MELLITUS, WITH LONG-TERM CURRENT USE OF INSULIN (HCC): Status: ACTIVE | Noted: 2020-12-24

## 2020-12-24 LAB
ABO + RH BLD: NORMAL
ABO GROUP BLD: NORMAL
ALBUMIN SERPL BCP-MCNC: 3.3 G/DL (ref 3.2–4.9)
ALBUMIN/GLOB SERPL: 1.3 G/DL
ALP SERPL-CCNC: 76 U/L (ref 30–99)
ALT SERPL-CCNC: 12 U/L (ref 2–50)
ANION GAP SERPL CALC-SCNC: 15 MMOL/L (ref 7–16)
ANISOCYTOSIS BLD QL SMEAR: ABNORMAL
AST SERPL-CCNC: 19 U/L (ref 12–45)
BARCODED ABORH UBTYP: 5100
BARCODED ABORH UBTYP: 5100
BARCODED PRD CODE UBPRD: NORMAL
BARCODED PRD CODE UBPRD: NORMAL
BARCODED UNIT NUM UBUNT: NORMAL
BARCODED UNIT NUM UBUNT: NORMAL
BASOPHILS # BLD AUTO: 0.2 % (ref 0–1.8)
BASOPHILS # BLD: 0.02 K/UL (ref 0–0.12)
BILIRUB SERPL-MCNC: 0.3 MG/DL (ref 0.1–1.5)
BLD GP AB SCN SERPL QL: NORMAL
BUN SERPL-MCNC: 21 MG/DL (ref 8–22)
BURR CELLS BLD QL SMEAR: NORMAL
CALCIUM SERPL-MCNC: 9 MG/DL (ref 8.5–10.5)
CHLORIDE SERPL-SCNC: 101 MMOL/L (ref 96–112)
CO2 SERPL-SCNC: 21 MMOL/L (ref 20–33)
COMMENT 1642: NORMAL
COMPONENT R 8504R: NORMAL
COMPONENT R 8504R: NORMAL
COVID ORDER STATUS COVID19: NORMAL
CREAT SERPL-MCNC: 1.15 MG/DL (ref 0.5–1.4)
EKG IMPRESSION: NORMAL
EOSINOPHIL # BLD AUTO: 0.04 K/UL (ref 0–0.51)
EOSINOPHIL NFR BLD: 0.4 % (ref 0–6.9)
ERYTHROCYTE [DISTWIDTH] IN BLOOD BY AUTOMATED COUNT: 73.8 FL (ref 35.9–50)
FERRITIN SERPL-MCNC: 338 NG/ML (ref 22–322)
FOLATE SERPL-MCNC: 2.8 NG/ML
GLOBULIN SER CALC-MCNC: 2.6 G/DL (ref 1.9–3.5)
GLUCOSE SERPL-MCNC: 89 MG/DL (ref 65–99)
HCT VFR BLD AUTO: 25.9 % (ref 42–52)
HGB BLD-MCNC: 7.9 G/DL (ref 14–18)
HGB RETIC QN AUTO: 28.4 PG/CELL (ref 29–35)
IMM GRANULOCYTES # BLD AUTO: 0.1 K/UL (ref 0–0.11)
IMM GRANULOCYTES NFR BLD AUTO: 0.9 % (ref 0–0.9)
IMM RETICS NFR: 35.7 % (ref 9.3–17.4)
IRON SATN MFR SERPL: 39 % (ref 15–55)
IRON SERPL-MCNC: 70 UG/DL (ref 50–180)
LYMPHOCYTES # BLD AUTO: 3.03 K/UL (ref 1–4.8)
LYMPHOCYTES NFR BLD: 28.5 % (ref 22–41)
MACROCYTES BLD QL SMEAR: ABNORMAL
MCH RBC QN AUTO: 31.1 PG (ref 27–33)
MCHC RBC AUTO-ENTMCNC: 30.5 G/DL (ref 33.7–35.3)
MCV RBC AUTO: 102 FL (ref 81.4–97.8)
MICROCYTES BLD QL SMEAR: ABNORMAL
MONOCYTES # BLD AUTO: 0.87 K/UL (ref 0–0.85)
MONOCYTES NFR BLD AUTO: 8.2 % (ref 0–13.4)
MORPHOLOGY BLD-IMP: NORMAL
NEUTROPHILS # BLD AUTO: 6.56 K/UL (ref 1.82–7.42)
NEUTROPHILS NFR BLD: 61.8 % (ref 44–72)
NRBC # BLD AUTO: 0 K/UL
NRBC BLD-RTO: 0 /100 WBC
NT-PROBNP SERPL IA-MCNC: 1422 PG/ML (ref 0–125)
OVALOCYTES BLD QL SMEAR: NORMAL
PLATELET # BLD AUTO: 432 K/UL (ref 164–446)
PLATELET BLD QL SMEAR: NORMAL
PMV BLD AUTO: 10.3 FL (ref 9–12.9)
POIKILOCYTOSIS BLD QL SMEAR: NORMAL
POLYCHROMASIA BLD QL SMEAR: NORMAL
POTASSIUM SERPL-SCNC: 4.3 MMOL/L (ref 3.6–5.5)
PRODUCT TYPE UPROD: NORMAL
PRODUCT TYPE UPROD: NORMAL
PROT SERPL-MCNC: 5.9 G/DL (ref 6–8.2)
RBC # BLD AUTO: 2.54 M/UL (ref 4.7–6.1)
RBC BLD AUTO: PRESENT
RETICS # AUTO: 0.27 M/UL (ref 0.04–0.06)
RETICS/RBC NFR: 11.6 % (ref 0.8–2.1)
RH BLD: NORMAL
SODIUM SERPL-SCNC: 137 MMOL/L (ref 135–145)
TIBC SERPL-MCNC: 180 UG/DL (ref 250–450)
TSH SERPL DL<=0.005 MIU/L-ACNC: 2.15 UIU/ML (ref 0.38–5.33)
UIBC SERPL-MCNC: 110 UG/DL (ref 110–370)
UNIT STATUS USTAT: NORMAL
UNIT STATUS USTAT: NORMAL
VIT B12 SERPL-MCNC: 1063 PG/ML (ref 211–911)
WBC # BLD AUTO: 10.6 K/UL (ref 4.8–10.8)

## 2020-12-24 PROCEDURE — 86900 BLOOD TYPING SEROLOGIC ABO: CPT

## 2020-12-24 PROCEDURE — 700105 HCHG RX REV CODE 258: Performed by: STUDENT IN AN ORGANIZED HEALTH CARE EDUCATION/TRAINING PROGRAM

## 2020-12-24 PROCEDURE — 86923 COMPATIBILITY TEST ELECTRIC: CPT

## 2020-12-24 PROCEDURE — P9016 RBC LEUKOCYTES REDUCED: HCPCS

## 2020-12-24 PROCEDURE — 36430 TRANSFUSION BLD/BLD COMPNT: CPT

## 2020-12-24 PROCEDURE — 85025 COMPLETE CBC W/AUTO DIFF WBC: CPT

## 2020-12-24 PROCEDURE — 82607 VITAMIN B-12: CPT

## 2020-12-24 PROCEDURE — 93005 ELECTROCARDIOGRAM TRACING: CPT | Performed by: STUDENT IN AN ORGANIZED HEALTH CARE EDUCATION/TRAINING PROGRAM

## 2020-12-24 PROCEDURE — 86850 RBC ANTIBODY SCREEN: CPT

## 2020-12-24 PROCEDURE — 80053 COMPREHEN METABOLIC PANEL: CPT

## 2020-12-24 PROCEDURE — A9270 NON-COVERED ITEM OR SERVICE: HCPCS | Performed by: STUDENT IN AN ORGANIZED HEALTH CARE EDUCATION/TRAINING PROGRAM

## 2020-12-24 PROCEDURE — 82746 ASSAY OF FOLIC ACID SERUM: CPT

## 2020-12-24 PROCEDURE — 82728 ASSAY OF FERRITIN: CPT

## 2020-12-24 PROCEDURE — 83880 ASSAY OF NATRIURETIC PEPTIDE: CPT

## 2020-12-24 PROCEDURE — 86901 BLOOD TYPING SEROLOGIC RH(D): CPT

## 2020-12-24 PROCEDURE — 770020 HCHG ROOM/CARE - TELE (206)

## 2020-12-24 PROCEDURE — 99223 1ST HOSP IP/OBS HIGH 75: CPT | Performed by: STUDENT IN AN ORGANIZED HEALTH CARE EDUCATION/TRAINING PROGRAM

## 2020-12-24 PROCEDURE — 700102 HCHG RX REV CODE 250 W/ 637 OVERRIDE(OP): Performed by: STUDENT IN AN ORGANIZED HEALTH CARE EDUCATION/TRAINING PROGRAM

## 2020-12-24 PROCEDURE — 99285 EMERGENCY DEPT VISIT HI MDM: CPT

## 2020-12-24 PROCEDURE — 30233N1 TRANSFUSION OF NONAUTOLOGOUS RED BLOOD CELLS INTO PERIPHERAL VEIN, PERCUTANEOUS APPROACH: ICD-10-PCS | Performed by: STUDENT IN AN ORGANIZED HEALTH CARE EDUCATION/TRAINING PROGRAM

## 2020-12-24 PROCEDURE — 83540 ASSAY OF IRON: CPT

## 2020-12-24 PROCEDURE — 83550 IRON BINDING TEST: CPT

## 2020-12-24 PROCEDURE — 36415 COLL VENOUS BLD VENIPUNCTURE: CPT

## 2020-12-24 PROCEDURE — U0003 INFECTIOUS AGENT DETECTION BY NUCLEIC ACID (DNA OR RNA); SEVERE ACUTE RESPIRATORY SYNDROME CORONAVIRUS 2 (SARS-COV-2) (CORONAVIRUS DISEASE [COVID-19]), AMPLIFIED PROBE TECHNIQUE, MAKING USE OF HIGH THROUGHPUT TECHNOLOGIES AS DESCRIBED BY CMS-2020-01-R: HCPCS

## 2020-12-24 PROCEDURE — C9803 HOPD COVID-19 SPEC COLLECT: HCPCS | Performed by: EMERGENCY MEDICINE

## 2020-12-24 PROCEDURE — 84443 ASSAY THYROID STIM HORMONE: CPT

## 2020-12-24 PROCEDURE — 93010 ELECTROCARDIOGRAM REPORT: CPT | Performed by: INTERNAL MEDICINE

## 2020-12-24 PROCEDURE — 85046 RETICYTE/HGB CONCENTRATE: CPT

## 2020-12-24 RX ORDER — ATORVASTATIN CALCIUM 40 MG/1
40 TABLET, FILM COATED ORAL NIGHTLY
Status: DISCONTINUED | OUTPATIENT
Start: 2020-12-24 | End: 2020-12-30 | Stop reason: HOSPADM

## 2020-12-24 RX ORDER — VITAMIN B COMPLEX
1000 TABLET ORAL DAILY
Status: DISCONTINUED | OUTPATIENT
Start: 2020-12-25 | End: 2020-12-30 | Stop reason: HOSPADM

## 2020-12-24 RX ORDER — TAMSULOSIN HYDROCHLORIDE 0.4 MG/1
0.4 CAPSULE ORAL
Status: DISCONTINUED | OUTPATIENT
Start: 2020-12-25 | End: 2020-12-30 | Stop reason: HOSPADM

## 2020-12-24 RX ORDER — LISINOPRIL AND HYDROCHLOROTHIAZIDE 25; 20 MG/1; MG/1
1 TABLET ORAL DAILY
Status: ON HOLD | COMMUNITY
End: 2020-12-30

## 2020-12-24 RX ORDER — BISACODYL 10 MG
10 SUPPOSITORY, RECTAL RECTAL
Status: DISCONTINUED | OUTPATIENT
Start: 2020-12-24 | End: 2020-12-30 | Stop reason: HOSPADM

## 2020-12-24 RX ORDER — SUCRALFATE 1 G/1
1 TABLET ORAL EVERY 6 HOURS
Status: DISCONTINUED | OUTPATIENT
Start: 2020-12-24 | End: 2020-12-30 | Stop reason: HOSPADM

## 2020-12-24 RX ORDER — LABETALOL HYDROCHLORIDE 5 MG/ML
10 INJECTION, SOLUTION INTRAVENOUS EVERY 4 HOURS PRN
Status: DISCONTINUED | OUTPATIENT
Start: 2020-12-24 | End: 2020-12-30

## 2020-12-24 RX ORDER — POLYETHYLENE GLYCOL 3350 17 G/17G
1 POWDER, FOR SOLUTION ORAL
Status: DISCONTINUED | OUTPATIENT
Start: 2020-12-24 | End: 2020-12-30 | Stop reason: HOSPADM

## 2020-12-24 RX ORDER — CARVEDILOL 25 MG/1
25 TABLET ORAL 2 TIMES DAILY WITH MEALS
Status: ON HOLD | COMMUNITY
End: 2020-12-29

## 2020-12-24 RX ORDER — METOPROLOL TARTRATE 1 MG/ML
5 INJECTION, SOLUTION INTRAVENOUS ONCE
Status: ACTIVE | OUTPATIENT
Start: 2020-12-24 | End: 2020-12-25

## 2020-12-24 RX ORDER — AMOXICILLIN 250 MG
2 CAPSULE ORAL 2 TIMES DAILY
Status: DISCONTINUED | OUTPATIENT
Start: 2020-12-24 | End: 2020-12-30 | Stop reason: HOSPADM

## 2020-12-24 RX ORDER — CHOLECALCIFEROL (VITAMIN D3) 125 MCG
1000 CAPSULE ORAL DAILY
Status: DISCONTINUED | OUTPATIENT
Start: 2020-12-25 | End: 2020-12-30 | Stop reason: HOSPADM

## 2020-12-24 RX ORDER — OMEPRAZOLE 20 MG/1
20 CAPSULE, DELAYED RELEASE ORAL 2 TIMES DAILY
Status: DISCONTINUED | OUTPATIENT
Start: 2020-12-24 | End: 2020-12-25

## 2020-12-24 RX ORDER — SODIUM CHLORIDE, SODIUM LACTATE, POTASSIUM CHLORIDE, CALCIUM CHLORIDE 600; 310; 30; 20 MG/100ML; MG/100ML; MG/100ML; MG/100ML
INJECTION, SOLUTION INTRAVENOUS CONTINUOUS
Status: DISCONTINUED | OUTPATIENT
Start: 2020-12-24 | End: 2020-12-29

## 2020-12-24 RX ADMIN — SUCRALFATE 1 G: 1 TABLET ORAL at 20:57

## 2020-12-24 RX ADMIN — SUCRALFATE 1 G: 1 TABLET ORAL at 23:59

## 2020-12-24 RX ADMIN — SODIUM CHLORIDE, POTASSIUM CHLORIDE, SODIUM LACTATE AND CALCIUM CHLORIDE: 600; 310; 30; 20 INJECTION, SOLUTION INTRAVENOUS at 20:57

## 2020-12-24 RX ADMIN — METOPROLOL TARTRATE 25 MG: 25 TABLET, FILM COATED ORAL at 20:57

## 2020-12-24 RX ADMIN — ATORVASTATIN CALCIUM 40 MG: 40 TABLET, FILM COATED ORAL at 20:57

## 2020-12-24 ASSESSMENT — ENCOUNTER SYMPTOMS
BLOOD IN STOOL: 1
CHILLS: 0
PALPITATIONS: 0
HEADACHES: 1
DIARRHEA: 1
EYES NEGATIVE: 1
COUGH: 0
NAUSEA: 0
PSYCHIATRIC NEGATIVE: 1
BACK PAIN: 0
FALLS: 0
SHORTNESS OF BREATH: 0
WEAKNESS: 1
VOMITING: 0
ABDOMINAL PAIN: 0
DIZZINESS: 1
FEVER: 0
HEARTBURN: 1

## 2020-12-24 ASSESSMENT — FIBROSIS 4 INDEX: FIB4 SCORE: 3.47

## 2020-12-24 NOTE — ED PROVIDER NOTES
ED Provider Note    This patient was cared for during the COVID-19 pandemic. History and physical exam may be limited/truncated by the inherent challenges of PPE and the need to decrease staff exposure to novel coronavirus. Some aspects of disease management may be different to protect staff and help slow the spread of disease. I verified that, if possible, the patient was wearing a mask and I was wearing appropriate PPE every time I encountered the patient.       CHIEF COMPLAINT  Chief Complaint   Patient presents with   • Abnormal Labs     hemoglobin 6.5, from facility. Recently seen here for GI bleed. Hx a-fib, took eliquis for afib, stopped recently due to GI bleed       HPI  Lakhwinder Vega is a 75 y.o. male who presents sent from rehab facility for evaluation of a low hemoglobin.  Patient has a history of A. fib.  He was previously on Eliquis he was admitted here recently and taken off this Eliquis when he had evidence of a GI bleed.  [facility] and found to have H. pylori a an ulcer.  He is on Carafate and PPI he was complaining of significant dyspnea on exertion and therefore is hemoglobin was checked and was found to be 6.5 on labs earlier today.  He had persistent black tarry stools and therefore was sent here for evaluation.        REVIEW OF SYSTEMS  positive for dyspnea on exertion weakness, negative for vomiting. All other systems are negative.     PAST MEDICAL HISTORY   has a past medical history of A-fib (HCC) and Diabetes (HCC).    SOCIAL HISTORY  Social History     Tobacco Use   • Smoking status: Never Smoker   • Smokeless tobacco: Never Used   Substance and Sexual Activity   • Alcohol use: Never     Frequency: Never   • Drug use: Never   • Sexual activity: Not on file       SURGICAL HISTORY  patient denies any surgical history    CURRENT MEDICATIONS  Home Medications    **Home medications have not yet been reviewed for this encounter**    "      ALLERGIES  Allergies   Allergen Reactions   • Pcn [Penicillins] Anaphylaxis and Hives     Hives on back of neck       PHYSICAL EXAM  VITAL SIGNS: /61   Pulse (!) 118   Temp 36.3 °C (97.3 °F) (Temporal)   Resp 18   Ht 1.727 m (5' 8\")   Wt 77.6 kg (171 lb)   SpO2 96%   BMI 26.00 kg/m² .  Constitutional: Alert in no apparent distress.  HENT: No signs of trauma, Bilateral external ears normal, Nose normal.   Eyes: Pupils are equal and reactive, Conjunctiva normal, Non-icteric.   Neck: Normal range of motion, No tenderness, Supple, No stridor.   Cardiovascular: Irregularly irregular and tachycardic, no murmurs.   Thorax & Lungs: Normal breath sounds, No respiratory distress, No wheezing, No chest tenderness.   Abdomen: Bowel sounds normal, Soft, No tenderness, No masses, No peritoneal signs.  Rectal: Hemoccult positive  Skin: Warm, Dry, No erythema, No rash.   Musculoskeletal:  no major deformities noted.   Neurologic: Alert,  No focal deficits noted.   Psychiatric: Affect normal, Judgment normal, Mood normal.       DIAGNOSTIC STUDIES / PROCEDURES        LABS  Labs Reviewed   CBC WITH DIFFERENTIAL - Abnormal; Notable for the following components:       Result Value    RBC 2.54 (*)     Hemoglobin 7.9 (*)     Hematocrit 25.9 (*)     .0 (*)     MCHC 30.5 (*)     RDW 73.8 (*)     Monos (Absolute) 0.87 (*)     Anisocytosis 2+ (*)     All other components within normal limits   COMP METABOLIC PANEL - Abnormal; Notable for the following components:    Total Protein 5.9 (*)     All other components within normal limits   PROBRAIN NATRIURETIC PEPTIDE, NT - Abnormal; Notable for the following components:    NT-proBNP 1422 (*)     All other components within normal limits   RETICULOCYTES COUNT - Abnormal; Notable for the following components:    Reticulocyte Count 11.6 (*)     Retic, Absolute 0.27 (*)     Imm. Reticulocyte Fraction 35.7 (*)     Retic Hgb Equivalent 28.4 (*)     All other components within " normal limits   FERRITIN - Abnormal; Notable for the following components:    Ferritin 338.0 (*)     All other components within normal limits   VITAMIN B12 - Abnormal; Notable for the following components:    Vitamin B12 -True Cobalamin 1063 (*)     All other components within normal limits   FOLATE - Abnormal; Notable for the following components:    Folate -Folic Acid 2.8 (*)     All other components within normal limits   COD (ADULT)   ABO RH CONFIRM   ESTIMATED GFR   PERIPHERAL SMEAR REVIEW   PLATELET ESTIMATE   MORPHOLOGY   DIFFERENTIAL COMMENT   COVID/SARS COV-2    Narrative:     Is patient being admitted?->Yes  Does this patient meet criteria for Rush/Cepheid per [facility]  Inpatient Workflow? (See workflow link below)->No  Expected turn around time?->Routine (In-House PCR up to 24  hours)  Have you been in close contact with a person who is suspected  or known to be positive for COVID-19 within the last 30 days  (e.g. last seen that person < 30 days ago)->No   SARS-COV-2, PCR (IN-HOUSE)    Narrative:     Is patient being admitted?->Yes  Does this patient meet criteria for Rush/Cepheid per [facility]  Inpatient Workflow? (See workflow link below)->No  Expected turn around time?->Routine (In-House PCR up to 24  hours)  Have you been in close contact with a person who is suspected  or known to be positive for COVID-19 within the last 30 days  (e.g. last seen that person < 30 days ago)->No   IRON/TOTAL IRON BIND   TSH WITH REFLEX TO FT4   OCCULT BLOOD STOOL   HEMOGLOBIN A1C   RELEASE RED BLOOD CELLS   TRANSFUSE RED BLOOD CELLS-NURSING COMMUNICATION (UNITS)       RADIOLOGY  No orders to display       Medical records reviewed for continuity of care.  Patient was recently admitted here with second through the 10th of this month.  He initially presented for a fall he had poor p.o. intake and severe dehydration.  He was hypotensive and had acute kidney injury.  He was septic.  Patient has a history of paroxysmal A. fib and  is on Eliquis.  He had one episode of melena prior to discharge.  Eliquis was discontinued patient was placed on a PPI he had no further episodes of melena and his hemoglobin was stable.  At that time Eliquis was held.  And he was sent to a skilled nursing facility.    Differential Diagnosis includes but is not limited to: Anemia, GI bleed    COURSE & MEDICAL DECISION MAKING  Pertinent Labs & Imaging studies reviewed. (See chart for details)    This is a 75-year-old gentleman that presents with anemia dyspnea on exertion and GI bleeding.  He is tachycardic A. fib with RVR.  He is not hypotensive.  He does have evidence of ongoing GI bleed on rectal exam.  He is not on any blood thinners at this time.  He has been on Carafate and PPI since his discharge from here a few weeks ago.  Labs from the rehab center showed a hemoglobin of 6.5.    Repeat labs here show a new hemoglobin of 7.9.  This is lower than when he was discharged a few weeks ago he was in the mid nines.  Given that he has symptomatic anemia with A. fib with RVR that is not well controlled at this time I do think he is symptomatic from this blood loss anemia it does seem on the slower side.  However it is ongoing for multiple weeks now.      He will require hospitalization for likely blood transfusion and possible GI evaluation.  I spoke with hospitalist who is agreeable to consult for hospitalization.  Patient will be hospitalized in guarded condition.    FINAL IMPRESSION  1. Anemia, unspecified type     2. Gastrointestinal hemorrhage, unspecified gastrointestinal hemorrhage type         2.   3.    This dictation has been creating using voice recognition software. The accuracy of the dictation is limited the abilities of the software.  I expect there may be some errors of grammar and possibly content. I made every attempt to manually correct the errors within my dictation. However errors related to this voice recognition software may still exist and  should be interpreted within the appropriate context.      The note accurately reflects work and decisions made by me.  Johanny Tolentino M.D.  12/24/2020  9:09 PM

## 2020-12-24 NOTE — ED NOTES
Pt reports he was sent here from his recovery facility after being diagnosed with a GI bleed. Reports that his hemoglobin was low and transported to [facility] for further testing and possible transfusion.

## 2020-12-25 ENCOUNTER — APPOINTMENT (OUTPATIENT)
Dept: RADIOLOGY | Facility: MEDICAL CENTER | Age: 75
DRG: 811 | End: 2020-12-25
Attending: INTERNAL MEDICINE
Payer: COMMERCIAL

## 2020-12-25 PROBLEM — N40.0 BPH (BENIGN PROSTATIC HYPERPLASIA): Status: ACTIVE | Noted: 2020-12-25

## 2020-12-25 PROBLEM — E78.5 HYPERLIPIDEMIA: Status: ACTIVE | Noted: 2020-12-25

## 2020-12-25 PROBLEM — U07.1 COVID-19: Status: ACTIVE | Noted: 2020-12-25

## 2020-12-25 PROBLEM — E87.6 HYPOKALEMIA: Status: RESOLVED | Noted: 2020-12-04 | Resolved: 2020-12-25

## 2020-12-25 PROBLEM — E46 MALNUTRITION (HCC): Status: ACTIVE | Noted: 2020-12-25

## 2020-12-25 PROBLEM — R33.9 URINARY RETENTION: Status: ACTIVE | Noted: 2020-12-25

## 2020-12-25 PROBLEM — E53.8 FOLIC ACID DEFICIENCY: Status: ACTIVE | Noted: 2020-12-25

## 2020-12-25 PROBLEM — M79.89 LEFT UPPER EXTREMITY SWELLING: Status: RESOLVED | Noted: 2020-12-08 | Resolved: 2020-12-25

## 2020-12-25 PROBLEM — N40.0 BPH (BENIGN PROSTATIC HYPERPLASIA): Status: RESOLVED | Noted: 2020-12-25 | Resolved: 2020-12-25

## 2020-12-25 PROBLEM — I10 HYPERTENSION: Status: ACTIVE | Noted: 2020-12-25

## 2020-12-25 LAB
ALBUMIN SERPL BCP-MCNC: 2.6 G/DL (ref 3.2–4.9)
ALBUMIN/GLOB SERPL: 1.3 G/DL
ALP SERPL-CCNC: 67 U/L (ref 30–99)
ALT SERPL-CCNC: 11 U/L (ref 2–50)
ANION GAP SERPL CALC-SCNC: 8 MMOL/L (ref 7–16)
AST SERPL-CCNC: 16 U/L (ref 12–45)
BASOPHILS # BLD AUTO: 0.4 % (ref 0–1.8)
BASOPHILS # BLD: 0.03 K/UL (ref 0–0.12)
BILIRUB SERPL-MCNC: 0.5 MG/DL (ref 0.1–1.5)
BUN SERPL-MCNC: 22 MG/DL (ref 8–22)
CALCIUM SERPL-MCNC: 8.1 MG/DL (ref 8.5–10.5)
CHLORIDE SERPL-SCNC: 102 MMOL/L (ref 96–112)
CHOLEST SERPL-MCNC: 80 MG/DL (ref 100–199)
CO2 SERPL-SCNC: 26 MMOL/L (ref 20–33)
CREAT SERPL-MCNC: 0.96 MG/DL (ref 0.5–1.4)
EOSINOPHIL # BLD AUTO: 0.11 K/UL (ref 0–0.51)
EOSINOPHIL NFR BLD: 1.5 % (ref 0–6.9)
ERYTHROCYTE [DISTWIDTH] IN BLOOD BY AUTOMATED COUNT: 69.1 FL (ref 35.9–50)
ERYTHROCYTE [DISTWIDTH] IN BLOOD BY AUTOMATED COUNT: 70.2 FL (ref 35.9–50)
ERYTHROCYTE [DISTWIDTH] IN BLOOD BY AUTOMATED COUNT: 71.7 FL (ref 35.9–50)
FERRITIN SERPL-MCNC: 257 NG/ML (ref 22–322)
FOLATE SERPL-MCNC: <2 NG/ML
GLOBULIN SER CALC-MCNC: 2 G/DL (ref 1.9–3.5)
GLUCOSE BLD-MCNC: 123 MG/DL (ref 65–99)
GLUCOSE SERPL-MCNC: 120 MG/DL (ref 65–99)
HCT VFR BLD AUTO: 21.5 % (ref 42–52)
HCT VFR BLD AUTO: 23.5 % (ref 42–52)
HCT VFR BLD AUTO: 24.3 % (ref 42–52)
HDLC SERPL-MCNC: 26 MG/DL
HGB BLD-MCNC: 6.9 G/DL (ref 14–18)
HGB BLD-MCNC: 7.4 G/DL (ref 14–18)
HGB BLD-MCNC: 7.6 G/DL (ref 14–18)
IMM GRANULOCYTES # BLD AUTO: 0.15 K/UL (ref 0–0.11)
IMM GRANULOCYTES NFR BLD AUTO: 2 % (ref 0–0.9)
IRON SATN MFR SERPL: 46 % (ref 15–55)
IRON SERPL-MCNC: 62 UG/DL (ref 50–180)
LDLC SERPL CALC-MCNC: 21 MG/DL
LYMPHOCYTES # BLD AUTO: 2.33 K/UL (ref 1–4.8)
LYMPHOCYTES NFR BLD: 30.9 % (ref 22–41)
MCH RBC QN AUTO: 31.1 PG (ref 27–33)
MCH RBC QN AUTO: 31.1 PG (ref 27–33)
MCH RBC QN AUTO: 31.5 PG (ref 27–33)
MCHC RBC AUTO-ENTMCNC: 31.3 G/DL (ref 33.7–35.3)
MCHC RBC AUTO-ENTMCNC: 31.5 G/DL (ref 33.7–35.3)
MCHC RBC AUTO-ENTMCNC: 32.1 G/DL (ref 33.7–35.3)
MCV RBC AUTO: 98.2 FL (ref 81.4–97.8)
MCV RBC AUTO: 98.7 FL (ref 81.4–97.8)
MCV RBC AUTO: 99.6 FL (ref 81.4–97.8)
MONOCYTES # BLD AUTO: 0.56 K/UL (ref 0–0.85)
MONOCYTES NFR BLD AUTO: 7.4 % (ref 0–13.4)
NEUTROPHILS # BLD AUTO: 4.37 K/UL (ref 1.82–7.42)
NEUTROPHILS NFR BLD: 57.8 % (ref 44–72)
NRBC # BLD AUTO: 0 K/UL
NRBC BLD-RTO: 0 /100 WBC
PLATELET # BLD AUTO: 315 K/UL (ref 164–446)
PLATELET # BLD AUTO: 346 K/UL (ref 164–446)
PLATELET # BLD AUTO: 370 K/UL (ref 164–446)
PMV BLD AUTO: 9.6 FL (ref 9–12.9)
PMV BLD AUTO: 9.8 FL (ref 9–12.9)
PMV BLD AUTO: 9.8 FL (ref 9–12.9)
POTASSIUM SERPL-SCNC: 4.3 MMOL/L (ref 3.6–5.5)
PROT SERPL-MCNC: 4.6 G/DL (ref 6–8.2)
RBC # BLD AUTO: 2.19 M/UL (ref 4.7–6.1)
RBC # BLD AUTO: 2.38 M/UL (ref 4.7–6.1)
RBC # BLD AUTO: 2.44 M/UL (ref 4.7–6.1)
SARS-COV-2 RNA RESP QL NAA+PROBE: DETECTED
SODIUM SERPL-SCNC: 136 MMOL/L (ref 135–145)
SPECIMEN SOURCE: ABNORMAL
TIBC SERPL-MCNC: 135 UG/DL (ref 250–450)
TRIGL SERPL-MCNC: 163 MG/DL (ref 0–149)
UIBC SERPL-MCNC: 73 UG/DL (ref 110–370)
VIT B12 SERPL-MCNC: 681 PG/ML (ref 211–911)
WBC # BLD AUTO: 7.2 K/UL (ref 4.8–10.8)
WBC # BLD AUTO: 7.6 K/UL (ref 4.8–10.8)
WBC # BLD AUTO: 8.2 K/UL (ref 4.8–10.8)

## 2020-12-25 PROCEDURE — A9270 NON-COVERED ITEM OR SERVICE: HCPCS | Performed by: NURSE PRACTITIONER

## 2020-12-25 PROCEDURE — 71045 X-RAY EXAM CHEST 1 VIEW: CPT

## 2020-12-25 PROCEDURE — A9270 NON-COVERED ITEM OR SERVICE: HCPCS | Performed by: INTERNAL MEDICINE

## 2020-12-25 PROCEDURE — 83540 ASSAY OF IRON: CPT

## 2020-12-25 PROCEDURE — 700102 HCHG RX REV CODE 250 W/ 637 OVERRIDE(OP): Performed by: STUDENT IN AN ORGANIZED HEALTH CARE EDUCATION/TRAINING PROGRAM

## 2020-12-25 PROCEDURE — 700102 HCHG RX REV CODE 250 W/ 637 OVERRIDE(OP): Performed by: GENERAL PRACTICE

## 2020-12-25 PROCEDURE — 85027 COMPLETE CBC AUTOMATED: CPT | Mod: 91

## 2020-12-25 PROCEDURE — 700102 HCHG RX REV CODE 250 W/ 637 OVERRIDE(OP): Performed by: INTERNAL MEDICINE

## 2020-12-25 PROCEDURE — 80061 LIPID PANEL: CPT

## 2020-12-25 PROCEDURE — 82607 VITAMIN B-12: CPT

## 2020-12-25 PROCEDURE — 700102 HCHG RX REV CODE 250 W/ 637 OVERRIDE(OP): Performed by: NURSE PRACTITIONER

## 2020-12-25 PROCEDURE — 99233 SBSQ HOSP IP/OBS HIGH 50: CPT | Mod: GC | Performed by: INTERNAL MEDICINE

## 2020-12-25 PROCEDURE — A9270 NON-COVERED ITEM OR SERVICE: HCPCS | Performed by: STUDENT IN AN ORGANIZED HEALTH CARE EDUCATION/TRAINING PROGRAM

## 2020-12-25 PROCEDURE — 700105 HCHG RX REV CODE 258: Performed by: STUDENT IN AN ORGANIZED HEALTH CARE EDUCATION/TRAINING PROGRAM

## 2020-12-25 PROCEDURE — 82962 GLUCOSE BLOOD TEST: CPT | Mod: 91

## 2020-12-25 PROCEDURE — 80053 COMPREHEN METABOLIC PANEL: CPT

## 2020-12-25 PROCEDURE — 85025 COMPLETE CBC W/AUTO DIFF WBC: CPT

## 2020-12-25 PROCEDURE — 82746 ASSAY OF FOLIC ACID SERUM: CPT

## 2020-12-25 PROCEDURE — 83550 IRON BINDING TEST: CPT

## 2020-12-25 PROCEDURE — 36415 COLL VENOUS BLD VENIPUNCTURE: CPT

## 2020-12-25 PROCEDURE — 770020 HCHG ROOM/CARE - TELE (206)

## 2020-12-25 PROCEDURE — A9270 NON-COVERED ITEM OR SERVICE: HCPCS | Performed by: GENERAL PRACTICE

## 2020-12-25 PROCEDURE — 82728 ASSAY OF FERRITIN: CPT

## 2020-12-25 RX ORDER — DEXTROSE MONOHYDRATE 25 G/50ML
50 INJECTION, SOLUTION INTRAVENOUS
Status: DISCONTINUED | OUTPATIENT
Start: 2020-12-25 | End: 2020-12-30 | Stop reason: HOSPADM

## 2020-12-25 RX ORDER — LISINOPRIL 20 MG/1
20 TABLET ORAL
Status: DISCONTINUED | OUTPATIENT
Start: 2020-12-25 | End: 2020-12-30 | Stop reason: HOSPADM

## 2020-12-25 RX ORDER — DEXTROSE MONOHYDRATE 25 G/50ML
50 INJECTION, SOLUTION INTRAVENOUS
Status: DISCONTINUED | OUTPATIENT
Start: 2020-12-25 | End: 2020-12-25 | Stop reason: ALTCHOICE

## 2020-12-25 RX ORDER — FOLIC ACID 1 MG/1
1 TABLET ORAL DAILY
Status: DISCONTINUED | OUTPATIENT
Start: 2020-12-25 | End: 2020-12-30 | Stop reason: HOSPADM

## 2020-12-25 RX ORDER — OMEPRAZOLE 20 MG/1
40 CAPSULE, DELAYED RELEASE ORAL 2 TIMES DAILY
Status: DISCONTINUED | OUTPATIENT
Start: 2020-12-25 | End: 2020-12-25

## 2020-12-25 RX ORDER — LISINOPRIL AND HYDROCHLOROTHIAZIDE 25; 20 MG/1; MG/1
1 TABLET ORAL DAILY
Status: DISCONTINUED | OUTPATIENT
Start: 2020-12-25 | End: 2020-12-25

## 2020-12-25 RX ORDER — PEG-3350, SODIUM SULFATE, SODIUM CHLORIDE, POTASSIUM CHLORIDE, SODIUM ASCORBATE AND ASCORBIC ACID 7.5-2.691G
100 KIT ORAL 2 TIMES DAILY
Status: DISCONTINUED | OUTPATIENT
Start: 2020-12-25 | End: 2020-12-25

## 2020-12-25 RX ORDER — INSULIN GLARGINE 100 [IU]/ML
0.2 INJECTION, SOLUTION SUBCUTANEOUS EVERY EVENING
Status: DISCONTINUED | OUTPATIENT
Start: 2020-12-25 | End: 2020-12-25 | Stop reason: CLARIF

## 2020-12-25 RX ORDER — OMEPRAZOLE 20 MG/1
20 CAPSULE, DELAYED RELEASE ORAL 2 TIMES DAILY
Status: DISCONTINUED | OUTPATIENT
Start: 2020-12-25 | End: 2020-12-30 | Stop reason: HOSPADM

## 2020-12-25 RX ORDER — PANTOPRAZOLE SODIUM 40 MG/10ML
40 INJECTION, POWDER, LYOPHILIZED, FOR SOLUTION INTRAVENOUS 2 TIMES DAILY
Status: DISCONTINUED | OUTPATIENT
Start: 2020-12-25 | End: 2020-12-25

## 2020-12-25 RX ORDER — CARVEDILOL 25 MG/1
25 TABLET ORAL 2 TIMES DAILY WITH MEALS
Status: DISCONTINUED | OUTPATIENT
Start: 2020-12-25 | End: 2020-12-28

## 2020-12-25 RX ORDER — HYDROCHLOROTHIAZIDE 25 MG/1
25 TABLET ORAL
Status: DISCONTINUED | OUTPATIENT
Start: 2020-12-25 | End: 2020-12-30

## 2020-12-25 RX ADMIN — HYDROCHLOROTHIAZIDE 25 MG: 25 TABLET ORAL at 10:49

## 2020-12-25 RX ADMIN — ATORVASTATIN CALCIUM 40 MG: 40 TABLET, FILM COATED ORAL at 21:06

## 2020-12-25 RX ADMIN — SODIUM CHLORIDE, POTASSIUM CHLORIDE, SODIUM LACTATE AND CALCIUM CHLORIDE: 600; 310; 30; 20 INJECTION, SOLUTION INTRAVENOUS at 10:52

## 2020-12-25 RX ADMIN — INSULIN LISPRO 1 UNITS: 100 INJECTION, SOLUTION INTRAVENOUS; SUBCUTANEOUS at 18:25

## 2020-12-25 RX ADMIN — LISINOPRIL 20 MG: 20 TABLET ORAL at 10:49

## 2020-12-25 RX ADMIN — SUCRALFATE 1 G: 1 TABLET ORAL at 13:07

## 2020-12-25 RX ADMIN — OMEPRAZOLE 20 MG: 20 CAPSULE, DELAYED RELEASE ORAL at 17:33

## 2020-12-25 RX ADMIN — OMEPRAZOLE 40 MG: 20 CAPSULE, DELAYED RELEASE ORAL at 10:49

## 2020-12-25 RX ADMIN — CYANOCOBALAMIN TAB 500 MCG 1000 MCG: 500 TAB at 06:25

## 2020-12-25 RX ADMIN — CARVEDILOL 25 MG: 25 TABLET, FILM COATED ORAL at 17:32

## 2020-12-25 RX ADMIN — TAMSULOSIN HYDROCHLORIDE 0.4 MG: 0.4 CAPSULE ORAL at 08:36

## 2020-12-25 RX ADMIN — OMEPRAZOLE 20 MG: 20 CAPSULE, DELAYED RELEASE ORAL at 06:25

## 2020-12-25 RX ADMIN — Medication 1000 UNITS: at 06:25

## 2020-12-25 RX ADMIN — SUCRALFATE 1 G: 1 TABLET ORAL at 06:25

## 2020-12-25 RX ADMIN — FOLIC ACID 1 MG: 1 TABLET ORAL at 17:32

## 2020-12-25 RX ADMIN — CARVEDILOL 25 MG: 25 TABLET, FILM COATED ORAL at 10:49

## 2020-12-25 RX ADMIN — SUCRALFATE 1 G: 1 TABLET ORAL at 17:33

## 2020-12-25 ASSESSMENT — ENCOUNTER SYMPTOMS
HEADACHES: 0
PSYCHIATRIC NEGATIVE: 1
RESPIRATORY NEGATIVE: 1
NERVOUS/ANXIOUS: 0
SORE THROAT: 0
EYES NEGATIVE: 1
ABDOMINAL PAIN: 0
BLOOD IN STOOL: 1
CHILLS: 0
SHORTNESS OF BREATH: 0
MYALGIAS: 0
DOUBLE VISION: 0
BLURRED VISION: 0
COUGH: 0
VOMITING: 0
DIARRHEA: 0
WEIGHT LOSS: 1
MUSCULOSKELETAL NEGATIVE: 1
HEARTBURN: 0
DEPRESSION: 0
DIZZINESS: 0
WEAKNESS: 1
FEVER: 0
NAUSEA: 0
CONSTIPATION: 0
CARDIOVASCULAR NEGATIVE: 1
PALPITATIONS: 0

## 2020-12-25 NOTE — ASSESSMENT & PLAN NOTE
During admission to [facility] earlier December 2020., Noted to have urinary retention and Crooks catheter was placed during this hospital stay.  Upon discharge recommendations were made for SNF staff to remove Crooks catheter and attempt voiding trials.  Has been receiving Flomax while in hospital.  Evaluated by physical and patient therapy recommended SNF placement.  -continue crooks and Tamsulosin  -will need outpatient follow up with Urology at the [facility]

## 2020-12-25 NOTE — ASSESSMENT & PLAN NOTE
A1c 7.1% December 2020.  -hold Metformin, Alogliptin, Glipizide, and Humulin  -ISS, hypoglycemic protocol

## 2020-12-25 NOTE — PROGRESS NOTES
Daily Progress Note:     Date of Service: 12/25/2020  Primary Team: [employer] IM Green Team   Attending: Gerber Tabares M.D.   Senior Resident: Dr. Savage  Intern: Dr. Padilla  Contact:  [phone]    Subjective: Overnight, no acute events. The patient denies pain, shortness of breath, nausea, vomiting, dysphagia, but does report black stools with blood, fatigue, weakness, and 40 pound weight loss since September. Reports no prior colonoscopy. Had an EGD in October 2020 at the [facility] in [place] that showed esophagitis and non-bleeding duodenal ulcer. Also treated for H. Pylori with triple therapy for 14 days. No NSAID, ETOH, Tobacco use. NO family history of colon cancer reported.    The patient had an EGD October 29,2020: ulcerative esophagitis grade B, mild narrowing at the GEJ junction s/p dilation, erosive gastritis. Pathology shows no malignancy, chronic gastritis, small clean base duodenal ulcer (2nd part 0.8cm X 0.5cm),and + H Pylori and treated with Metronidazole 500mg BID, Tetracycline 250mg BID and Omeprazole 20mg BID for 14 days. started on Pantoprazole BID afterwards. To the patient was to follow up with  outpt GI [facility] 10-12 weeks.[facility].5 months of >30 pound weight loss, N/V, dysphagia to solids, liquids.     The patient was admitted to [facility] earlier this month. The patient complained of dysphagia to solids and liquids and barium swallow was unremarkable. Carafate was added. The night prior to discharge, the patient had one melanotic stool, hemoglobin downtrended to 9.5. Eliquis was stopped and put on IV PPI. No further episodes of melena reported. Patient put on Aspirin 81mg daily and told to follow up with his Cardiologist. The patient reports he is not taking Aspirin now.         Consultants/Specialty:  Gastroenterology    Review of Systems:   Review of Systems   Constitutional: Positive for malaise/fatigue and weight loss. Negative for chills and fever.    HENT: Negative.  Negative for congestion and sore throat.    Eyes: Negative.  Negative for blurred vision and double vision.   Respiratory: Negative.  Negative for cough and shortness of breath.    Cardiovascular: Negative.  Negative for chest pain and palpitations.   Gastrointestinal: Positive for blood in stool and melena. Negative for abdominal pain, constipation, diarrhea, heartburn, nausea and vomiting.   Genitourinary: Negative.  Negative for dysuria, frequency, hematuria and urgency.   Musculoskeletal: Negative.  Negative for joint pain and myalgias.   Skin: Negative.  Negative for rash.   Neurological: Positive for weakness. Negative for dizziness and headaches.   Psychiatric/Behavioral: Negative.  Negative for depression. The patient is not nervous/anxious.        Objective Data:   Physical Exam:   Vitals:   Temp:  [36.2 °C (97.1 °F)-36.6 °C (97.8 °F)] 36.3 °C (97.3 °F)  Pulse:  [] 102  Resp:  [16-24] 16  BP: (102-148)/(59-80) 148/80  SpO2:  [95 %-100 %] 98 %   Physical Exam  Vitals signs and nursing note reviewed.   Constitutional:       Appearance: Normal appearance.   HENT:      Head: Normocephalic and atraumatic.      Mouth/Throat:      Mouth: Mucous membranes are dry.      Pharynx: No oropharyngeal exudate or posterior oropharyngeal erythema.   Eyes:      Extraocular Movements: Extraocular movements intact.      Conjunctiva/sclera: Conjunctivae normal.      Pupils: Pupils are equal, round, and reactive to light.   Neck:      Musculoskeletal: Neck supple. No muscular tenderness.   Cardiovascular:      Rate and Rhythm: Normal rate and regular rhythm.      Pulses: Normal pulses.      Heart sounds: Normal heart sounds. No murmur. No friction rub. No gallop.    Pulmonary:      Effort: No respiratory distress.      Breath sounds: Normal breath sounds. No stridor. No wheezing, rhonchi or rales.   Abdominal:      General: Abdomen is flat. Bowel sounds are normal. There is no distension.       Palpations: Abdomen is soft.      Tenderness: There is no abdominal tenderness.   Musculoskeletal:         General: No swelling, tenderness, deformity or signs of injury.      Right lower leg: No edema.      Left lower leg: No edema.   Skin:     General: Skin is warm.      Coloration: Skin is not pale.      Findings: No lesion or rash.   Neurological:      General: No focal deficit present.      Mental Status: He is alert and oriented to person, place, and time.   Psychiatric:         Mood and Affect: Mood normal.         Behavior: Behavior normal.           Labs:   Recent Labs     20  1501 20  0605   WBC 10.6 7.6   RBC 2.54* 2.38*   HEMOGLOBIN 7.9* 7.4*   HEMATOCRIT 25.9* 23.5*   .0* 98.7*   MCH 31.1 31.1   RDW 73.8* 70.2*   PLATELETCT 432 370   MPV 10.3 9.8   NEUTSPOLYS 61.80 57.80   LYMPHOCYTES 28.50 30.90   MONOCYTES 8.20 7.40   EOSINOPHILS 0.40 1.50   BASOPHILS 0.20 0.40   RBCMORPHOLO Present  --      Recent Labs     20  1501 20  0605   SODIUM 137 136   POTASSIUM 4.3 4.3   CHLORIDE 101 102   CO2 21 26   GLUCOSE 89 120*   BUN 21 22     Iron: 62  Total iron bindin  % saturation  Unsat Iron bindin    Total cholesterol: 80  Triglycerides: 163  HDL: 26  LDL 21    Ferritin: 257  Folic acid < 2.0  B12: 681  TSH: 2.15  BNP: 1422      Imaging:   No orders to display       Problem Representation: 75 year old male with a history of Afib, HTN, Type 2 Diabetes Mellitus, dysphagia and UGI s/p EGD 2020 showing esophagitis and peptic ulcer disease w/ H. Pylori on pathology and treated for 14 days of triple therapy, presented with symptomatic anemia with HgB 6.5 s/p 1 unit pRBC in the ED.     * Symptomatic anemia  Assessment & Plan  Patient reporting dyspnea on exertion, and orthostatic changes. Guaiac + in the ED. Came from nursing home with HgB 6.5, was 7.9  In the ED, now 7.4 this morning. Had one unit of pRBC transfused yesterday.  -continue IVF  -H/H Q8H   -continue  Omeprazole 20mg BID   -continue Carafate 1gm Q6H  -transfuse if HgB <7  -GI consulted, Dr. Noel, had planned to do an EGD and Colonoscopy tomorrow, but per GI, the patient [facility] or call GI when he has cleared COVID. Endoscopy is not a consideration until he clears COVID.     COVID-19  Assessment & Plan  + test after coming to the floor.  -airborne precautions placed  -per bed manager, patient will self isolate in theon Telemetry 8 where he is now since the patient is asymptomatic.    Paroxysmal atrial fibrillation (HCC)- (present on admission)  Assessment & Plan  Echo 10/27/20:EF 60-65%. Afib: CHADSVASC 4: annual risk of stroke 4.8% (>75, HTN, Dm2).HAS-BLED score 2: moderate risk of bleeding.   -hold Eliquis and all blood thinners  -continue Metoprolol as prescribed  -telemetry      Malnutrition (HCC)  Assessment & Plan  Albumin 2.6  -prealbumin  -nutrition consult    Folic acid deficiency  Assessment & Plan  < 2.0  -add folic acid 1mg daily. Likely due to poor nutrition    Hyperlipidemia  Assessment & Plan  Continue home Lipitor     Hypertension  Assessment & Plan  /80 most recently  -restart home Lisinopril/HCTZ and Carvedilol  -CTM    Type 2 diabetes mellitus, with long-term current use of insulin ([facility])  Assessment & Plan  A1c 7.1% December 2020.  -hold Metformin, Alogliptin, Glipizide, and Humulin  -ISS  -hypoglycemic protocol  -accu checks  -diabetic diet      Debility- (present on admission)  Assessment & Plan  Secondary to symptomatic anemia  PT/OT    Urinary retention  Assessment & Plan  During admission to [facility] earlier December 2020., Noted to have urinary retention and Crooks catheter was placed during this hospital stay.  Upon discharge recommendations were made for SNF staff to remove Crooks catheter and attempt voiding trials.  Has been receiving Flomax while in hospital.  Evaluated by physical and patient therapy recommended SNF placement.  -continue crooks  and Tamsulosin  -will need outpatient follow up with Urology at the [facility]

## 2020-12-25 NOTE — PROGRESS NOTES
Received report from previous nurse, Agustin, regarding prior 12 hours.  POC reviewed with pt, white board updated, pt verbalized understanding, call light within reach.  Pt encouraged to call before getting up.  Bed in lowest position, treaded slippers on.

## 2020-12-25 NOTE — PROGRESS NOTES
Patient's hemoglobin is 7.4.  Current order states to transfuse at or below 8.0.  Called Dr. Padilla to update.  No new orders at this time.  He will round with team in approximately 20 minutes.

## 2020-12-25 NOTE — ASSESSMENT & PLAN NOTE
Echo 10/27/20:EF 60-65%. Afib: CHADSVASC 4: annual risk of stroke 4.8% (>75, HTN, Dm2).HAS-BLED score 2: moderate risk of bleeding.   -hold Eliquis and all blood thinners  -continue Metoprolol as prescribed  -telemetry

## 2020-12-25 NOTE — ASSESSMENT & PLAN NOTE
COVID Positive by PCR after arriving to the floor  On Isolation  Patient Saturating 95% on RA  Not dyspneic and no respiratory complaints

## 2020-12-25 NOTE — H&P
Hospital Medicine History & Physical Note    Date of Service  12/24/2020    Primary Care Physician  No primary care provider on file.    Consultants  None    Code Status  FULL  Chief Complaint  Chief Complaint   Patient presents with   • Abnormal Labs     hemoglobin 6.5, from facility. Recently seen here for GI bleed. Hx ramirez, took eliquis for afib, stopped recently due to GI bleed       History of Presenting Illness  75-year-old male with a past medical history of atrial fibrillation on metoprolol, dyslipidemia on atorvastatin, diabetes on Metformin/glipizide and chronic history of slow GI bleed was transferred from [facility] for symptomatic anemia with hemoglobin below 7.  At the emergency department vital signs showing tachycardia and tachypnea.  Patient saturating well room air.  CBC with macrocytic anemia and elevated RDW.  No leukocytosis.  Dementia without chronic abnormalities.  Patient was admitted to telemetry for symptomatic anemia requiring blood transfusion.    Review of Systems  Review of Systems   Constitutional: Positive for malaise/fatigue. Negative for chills and fever.   HENT: Negative.    Eyes: Negative.    Respiratory: Negative for cough and shortness of breath.    Cardiovascular: Negative for chest pain, palpitations and leg swelling.   Gastrointestinal: Positive for blood in stool, diarrhea and heartburn. Negative for abdominal pain, nausea and vomiting.   Genitourinary: Negative.    Musculoskeletal: Negative for back pain and falls.   Skin: Negative.    Neurological: Positive for dizziness, weakness and headaches.   Endo/Heme/Allergies: Negative.    Psychiatric/Behavioral: Negative.        Past Medical History   has a past medical history of A-fib (HCC) and Diabetes (HCC).    Surgical History   has no past surgical history on file.     Family History  Family history is unknown by patient.     Social History   reports that he has never smoked. He has never used smokeless  tobacco. He reports that he does not drink alcohol or use drugs.    Allergies  Allergies   Allergen Reactions   • Pcn [Penicillins] Anaphylaxis and Hives     Hives on back of neck       Medications  Prior to Admission Medications   Prescriptions Last Dose Informant Patient Reported? Taking?   Alogliptin Benzoate 12.5 MG Tab  Patient's Home Pharmacy Yes No   Sig: Take 12.5 mg by mouth every day.   Cyanocobalamin (VITAMIN B12) 1000 MCG Tab CR  Patient's Home Pharmacy Yes No   Sig: Take 1,000 mcg by mouth every day.   aspirin 81 MG EC tablet   No No   Sig: Take 1 Tab by mouth every day.   atorvastatin (LIPITOR) 40 MG Tab  Patient's Home Pharmacy Yes No   Sig: Take 40 mg by mouth every evening.   glipiZIDE (GLUCOTROL) 5 MG Tab  Patient's Home Pharmacy Yes No   Sig: Take 5 mg by mouth 2 times a day.   insulin regular (HUMULIN R) 100 Unit/mL Solution   No No   Sig: Inject 1-6 Units under the skin 4 Times a Day,Before Meals and at Bedtime.   metFORMIN (GLUCOPHAGE) 500 MG Tab  Patient's Home Pharmacy Yes No   Sig: Take 500 mg by mouth 2 times a day, with meals.   metoprolol (LOPRESSOR) 25 MG Tab   No No   Sig: Take 1 Tab by mouth 2 Times a Day.   metroNIDAZOLE (FLAGYL) 500 MG Tab  Patient's Home Pharmacy Yes No   Sig: Take 500 mg by mouth 2 Times a Day. 14 day course   mirtazapine (REMERON) 15 MG Tab   No No   Sig: Take 1 Tab by mouth every bedtime.   pantoprazole (PROTONIX) 40 MG Tablet Delayed Response  Patient's Home Pharmacy Yes No   Sig: Take 40 mg by mouth 2 times a day.   sucralfate (CARAFATE) 1 GM Tab   No No   Sig: Take 1 Tab by mouth every 6 hours.   tamsulosin (FLOMAX) 0.4 MG capsule   No No   Sig: Take 1 Cap by mouth 1/2 hour after breakfast.   tetracycline (SUMYCIN) 250 MG Cap  Patient's Home Pharmacy Yes No   Sig: Take 250 mg by mouth 2 times a day. 14 day course   vitamin D (CHOLECALCIFEROL) 1000 Unit (25 mcg) Tab  Patient's Home Pharmacy Yes No   Sig: Take 1,000 Units by mouth every day.       Facility-Administered Medications: None       Physical Exam  Temp:  [36.6 °C (97.8 °F)] 36.6 °C (97.8 °F)  Pulse:  [113-114] 113  Resp:  [18-24] 24  BP: (128)/(60) 128/60  SpO2:  [100 %] 100 %    Physical Exam  Constitutional:       General: He is not in acute distress.     Appearance: Normal appearance.   HENT:      Head: Normocephalic and atraumatic.      Nose: Nose normal. No congestion.      Mouth/Throat:      Mouth: Mucous membranes are dry.   Eyes:      Extraocular Movements: Extraocular movements intact.      Pupils: Pupils are equal, round, and reactive to light.   Neck:      Musculoskeletal: Normal range of motion and neck supple.   Cardiovascular:      Rate and Rhythm: Tachycardia present. Rhythm irregular.      Pulses: Normal pulses.      Heart sounds: Normal heart sounds.   Pulmonary:      Effort: Pulmonary effort is normal. No respiratory distress.      Breath sounds: Normal breath sounds. No rales.   Abdominal:      General: Bowel sounds are normal.      Palpations: Abdomen is soft.      Tenderness: There is no abdominal tenderness.   Musculoskeletal: Normal range of motion.         General: No swelling.      Right lower leg: No edema.      Left lower leg: No edema.   Skin:     General: Skin is warm and dry.      Coloration: Skin is pale. Skin is not jaundiced.   Neurological:      General: No focal deficit present.      Mental Status: He is alert and oriented to person, place, and time. Mental status is at baseline.      Cranial Nerves: No cranial nerve deficit.   Psychiatric:         Mood and Affect: Mood normal.         Behavior: Behavior normal.         Thought Content: Thought content normal.         Judgment: Judgment normal.         Laboratory:  Recent Labs     12/24/20  1501   WBC 10.6   RBC 2.54*   HEMOGLOBIN 7.9*   HEMATOCRIT 25.9*   .0*   MCH 31.1   MCHC 30.5*   RDW 73.8*   PLATELETCT 432   MPV 10.3     Recent Labs     12/24/20  1501   SODIUM 137   POTASSIUM 4.3   CHLORIDE 101   CO2  21   GLUCOSE 89   BUN 21   CREATININE 1.15   CALCIUM 9.0     Recent Labs     12/24/20  1501   ALTSGPT 12   ASTSGOT 19   ALKPHOSPHAT 76   TBILIRUBIN 0.3   GLUCOSE 89         No results for input(s): NTPROBNP in the last 72 hours.      No results for input(s): TROPONINT in the last 72 hours.    Imaging:  No orders to display         Assessment/Plan:  I anticipate this patient will require at least two midnights for appropriate medical management, necessitating inpatient admission.    * Symptomatic anemia  Assessment & Plan  Patient reporting dyspnea on exertion, and orthostatic changes  Satting well room air and stable BP  Hemoglobin at 7.9  Guaiac positive at ED  12/10/2020 iron studies suggestive of anemia of chronic disease  Repeat iron studies  Continue home PPI twice daily and sucralfate  Transfuse 1 unit of PRBC as patient symptomatic  H&H every 8 hours  Labs on AM  Day team to consult GI    Type 2 diabetes mellitus, with long-term current use of insulin (HCC)  Assessment & Plan  Glucose in the lower side  Hold diabetes medications for now  Accu-Cheks twice daily    Paroxysmal atrial fibrillation (HCC)- (present on admission)  Assessment & Plan  Hold off anticoagulation for now  Continue home metoprolol    Debility- (present on admission)  Assessment & Plan  Secondary to symptomatic anemia  PT/OT    DVT prophylaxis: SCDs 2/2 GI bleed

## 2020-12-25 NOTE — CONSULTS
Gastroenterology Consult Note:    EDMUND Walker  Date & Time note created:    12/25/2020   9:00 AM     Referring MD:  Dr. Gerber Tabares  Patient ID:  Name:             Lakhwinder Vega   YOB: 1945  Age:                 75 y.o.  male   MRN:               [id number]                                                             Reason for Consult:      1. Anemia  2. Melena    History of Present Illness:    This is a 75-year-old male, past medical history atrial fibrillation on metoprolol, dyslipidemia, diabetes, duodenal ulcer/esophagitis who was transferred from [facility] to ER with symptomatic anemia-hemoglobin 6.5, melenic stool, dyspnea.  ER labs-hemoglobin 7.9.  Hematocrit 25.9.  .0.  RDW 73.8.  Platelet count 432.  BUN 21.  Creatinine 1.15.  He denies abdominal pain, hematemesis, coffee-ground emesis, changes in bowel habits, bright red blood.  Never had a colonoscopy.  He had 2 Cologuard test which were negative.  He ate breakfast this morning.    Previously taking Eliquis for atrial fibrillation. In October 2020, developed anemia and melenic stool, fatigue, unintentional weight loss over 20 pounds. Upper endoscopy performed at the [facility] with findings of duodenal ulcer, esophagitis, H. pylori.  Treated for H. pylori.  After treatment, he did not continue taking PPI medication. (No procedure notes available for review.)    He was recently hospitalized at [facility] with complaints of a fall on 12/2/2020. Four days prior to hospitalization, he developed nausea/vomiting with poor p.o. intake, severe dehydration.  During the hospitalization, he was complaining of dysphagia-barium swallow study-unremarkable.  He had one episode of melenic stool.  Eliquis was discontinued.  He was started on IV PPI and Carafate-no further episodes of melenic stool.  H&H stabilized. No GI consult during this hospitalization.      Review of Systems:      Review of Systems    Constitutional: Positive for malaise/fatigue and weight loss.   HENT: Negative.    Eyes: Negative.    Respiratory: Negative.    Cardiovascular: Negative.    Gastrointestinal: Positive for melena.   Musculoskeletal: Negative.    Skin: Negative.    Neurological: Positive for weakness.             Physical Exam:  Vitals/ General Appearance:   Weight/BMI: Body mass index is 26 kg/m².    Vitals:    12/24/20 2353 12/25/20 0342 12/25/20 0600 12/25/20 0729   BP: 102/59 109/64 105/62 148/80   Pulse: (!) 103 88  (!) 102   Resp: 16 18  16   Temp: 36.2 °C (97.1 °F) 36.3 °C (97.3 °F)  36.3 °C (97.3 °F)   TempSrc: Temporal Temporal  Temporal   SpO2: 95% 95%  98%   Weight:       Height:         Oxygen Therapy:  Pulse Oximetry: 98 %, O2 (LPM): 0, O2 Delivery Device: None - Room Air    Physical Exam   Constitutional: He is oriented to person, place, and time and well-developed, well-nourished, and in no distress.   HENT:   Head: Normocephalic and atraumatic.   Eyes: Pupils are equal, round, and reactive to light.   Neck: Normal range of motion. Neck supple.   Cardiovascular: Normal heart sounds. Tachycardia present.   Pulmonary/Chest: Effort normal and breath sounds normal.   Abdominal: Soft. Normal appearance. There is no abdominal tenderness. There is no rebound.   Neurological: He is alert and oriented to person, place, and time.   Skin: Skin is warm, dry and intact.       Past Medical History:   Past Medical History:   Diagnosis Date   • A-fib (HCC)    • Diabetes (HCC)        Past Surgical History:  No past surgical history on file.    Hospital Medications:    Current Facility-Administered Medications:   •  Notify, , , Once **AND** glucose 4 g chewable tablet 16 g, 16 g, Oral, Q15 MIN PRN **AND** dextrose 50% (D50W) injection 50 mL, 50 mL, Intravenous, Q15 MIN PRN, Mendez Padilla Jr., M.D.  •  [DISCONTINUED] insulin glargine (Lantus) injection, 0.2 Units/kg/day, Subcutaneous, Q EVENING **AND** [DISCONTINUED] insulin  lispro (HumaLOG) injection, 0.2 Units/kg/day, Subcutaneous, TID AC **AND** insulin lispro (HumaLOG) injection, 1-6 Units, Subcutaneous, 4X/DAY ACHS **AND** POC Blood Glucose, , , Q AC AND BEDTIME(S) **AND** NOTIFY MD and PharmD, , , Once **AND** [DISCONTINUED] glucose 4 g chewable tablet 16 g, 16 g, Oral, Q15 MIN PRN **AND** [DISCONTINUED] dextrose 50% (D50W) injection 50 mL, 50 mL, Intravenous, Q15 MIN PRN, Mendez Padilla Jr., M.D.  •  Metoprolol Tartrate (LOPRESSOR) injection 5 mg, 5 mg, Intravenous, Once, Johanny Tolentino M.D.  •  atorvastatin (LIPITOR) tablet 40 mg, 40 mg, Oral, Nightly, Avinash Elizalde M.D., 40 mg at 12/24/20 2057  •  cyanocobalamin (VITAMIN B-12) tablet 1,000 mcg, 1,000 mcg, Oral, DAILY, Avinash Elizalde M.D., 1,000 mcg at 12/25/20 0625  •  omeprazole (PRILOSEC) capsule 20 mg, 20 mg, Oral, BID, Avinash Elizalde M.D., 20 mg at 12/25/20 0625  •  sucralfate (CARAFATE) tablet 1 g, 1 g, Oral, Q6HRS, Avinash Elizalde M.D., 1 g at 12/25/20 0625  •  tamsulosin (FLOMAX) capsule 0.4 mg, 0.4 mg, Oral, AFTER BREAKFAST, Avinash Elizalde M.D., 0.4 mg at 12/25/20 0836  •  vitamin D (cholecalciferol) tablet 1,000 Units, 1,000 Units, Oral, DAILY, Avinash Elizalde M.D., 1,000 Units at 12/25/20 0625  •  senna-docusate (PERICOLACE or SENOKOT S) 8.6-50 MG per tablet 2 Tab, 2 Tab, Oral, BID **AND** polyethylene glycol/lytes (MIRALAX) PACKET 1 Packet, 1 Packet, Oral, QDAY PRN **AND** magnesium hydroxide (MILK OF MAGNESIA) suspension 30 mL, 30 mL, Oral, QDAY PRN **AND** bisacodyl (DULCOLAX) suppository 10 mg, 10 mg, Rectal, QDAY PRN, Avinash Elizalde M.D.  •  lactated ringers infusion, , Intravenous, Continuous, Avinash Elizalde M.D., Last Rate: 83 mL/hr at 12/24/20 2057, New Bag at 12/24/20 2057  •  labetalol (NORMODYNE/TRANDATE) injection 10 mg, 10 mg, Intravenous, Q4HRS PRN, Avinash Elizalde M.D.  •  metoprolol (LOPRESSOR) tablet  25 mg, 25 mg, Oral, BID, Avinash Elizalde M.D., Stopped at 12/25/20 0600    Current Outpatient Medications:  Current Facility-Administered Medications   Medication Dose Route Frequency Provider Last Rate Last Admin   • glucose 4 g chewable tablet 16 g  16 g Oral Q15 MIN PRN Mendez Padilla Jr., M.D.        And   • dextrose 50% (D50W) injection 50 mL  50 mL Intravenous Q15 MIN PRN Mendez Padilla Jr., M.D.       • insulin lispro (HumaLOG) injection  1-6 Units Subcutaneous 4X/DAY ACHS Mendez Padilla Jr., M.D.       • Metoprolol Tartrate (LOPRESSOR) injection 5 mg  5 mg Intravenous Once Johanny Tolentino M.D.       • atorvastatin (LIPITOR) tablet 40 mg  40 mg Oral Nightly Avinash Elizalde M.D.   40 mg at 12/24/20 2057   • cyanocobalamin (VITAMIN B-12) tablet 1,000 mcg  1,000 mcg Oral DAILY Avinash Elizalde M.D.   1,000 mcg at 12/25/20 0625   • omeprazole (PRILOSEC) capsule 20 mg  20 mg Oral BID Avinash Elizalde M.D.   20 mg at 12/25/20 0625   • sucralfate (CARAFATE) tablet 1 g  1 g Oral Q6HRS Avinash Elizalde M.D.   1 g at 12/25/20 0625   • tamsulosin (FLOMAX) capsule 0.4 mg  0.4 mg Oral AFTER BREAKFAST Avinash Elizalde M.D.   0.4 mg at 12/25/20 0836   • vitamin D (cholecalciferol) tablet 1,000 Units  1,000 Units Oral DAILY Avinash Elizalde M.D.   1,000 Units at 12/25/20 0625   • senna-docusate (PERICOLACE or SENOKOT S) 8.6-50 MG per tablet 2 Tab  2 Tab Oral BID Avinash Elizalde M.D.        And   • polyethylene glycol/lytes (MIRALAX) PACKET 1 Packet  1 Packet Oral QDAY PRN Avinash Elizalde M.D.        And   • magnesium hydroxide (MILK OF MAGNESIA) suspension 30 mL  30 mL Oral QDAY PRN Avinash Elizalde M.D.        And   • bisacodyl (DULCOLAX) suppository 10 mg  10 mg Rectal QDAY PRN Avinash Elizalde M.D.       • lactated ringers infusion   Intravenous Continuous Avinash Elizalde M.D. 83 mL/hr at 12/24/20  2057 New Bag at 12/24/20 2057   • labetalol (NORMODYNE/TRANDATE) injection 10 mg  10 mg Intravenous Q4HRS PRN Avinash Elizalde M.D.       • metoprolol (LOPRESSOR) tablet 25 mg  25 mg Oral BID Avinash Elizalde M.D.   Stopped at 12/25/20 0600       Medication Allergy:  Allergies   Allergen Reactions   • Pcn [Penicillins] Anaphylaxis and Hives     Hives on back of neck       Family History:  Family History   Family history unknown: Yes       Social History:  Social History     Socioeconomic History   • Marital status: Single     Spouse name: Not on file   • Number of children: Not on file   • Years of education: Not on file   • Highest education level: Not on file   Occupational History   • Not on file   Social Needs   • Financial resource strain: Not on file   • Food insecurity     Worry: Not on file     Inability: Not on file   • Transportation needs     Medical: Not on file     Non-medical: Not on file   Tobacco Use   • Smoking status: Never Smoker   • Smokeless tobacco: Never Used   Substance and Sexual Activity   • Alcohol use: Never     Frequency: Never   • Drug use: Never   • Sexual activity: Not on file   Lifestyle   • Physical activity     Days per week: Not on file     Minutes per session: Not on file   • Stress: Not on file   Relationships   • Social connections     Talks on phone: Not on file     Gets together: Not on file     Attends Orthodox service: Not on file     Active member of club or organization: Not on file     Attends meetings of clubs or organizations: Not on file     Relationship status: Not on file   • Intimate partner violence     Fear of current or ex partner: Not on file     Emotionally abused: Not on file     Physically abused: Not on file     Forced sexual activity: Not on file   Other Topics Concern   • Not on file   Social History Narrative   • Not on file         MDM (Data Review):     Records reviewed and summarized in current documentation    Lab Data  Review:  Recent Results (from the past 24 hour(s))   CBC WITH DIFFERENTIAL    Collection Time: 12/24/20  3:01 PM   Result Value Ref Range    WBC 10.6 4.8 - 10.8 K/uL    RBC 2.54 (L) 4.70 - 6.10 M/uL    Hemoglobin 7.9 (L) 14.0 - 18.0 g/dL    Hematocrit 25.9 (L) 42.0 - 52.0 %    .0 (H) 81.4 - 97.8 fL    MCH 31.1 27.0 - 33.0 pg    MCHC 30.5 (L) 33.7 - 35.3 g/dL    RDW 73.8 (H) 35.9 - 50.0 fL    Platelet Count 432 164 - 446 K/uL    MPV 10.3 9.0 - 12.9 fL    Neutrophils-Polys 61.80 44.00 - 72.00 %    Lymphocytes 28.50 22.00 - 41.00 %    Monocytes 8.20 0.00 - 13.40 %    Eosinophils 0.40 0.00 - 6.90 %    Basophils 0.20 0.00 - 1.80 %    Immature Granulocytes 0.90 0.00 - 0.90 %    Nucleated RBC 0.00 /100 WBC    Neutrophils (Absolute) 6.56 1.82 - 7.42 K/uL    Lymphs (Absolute) 3.03 1.00 - 4.80 K/uL    Monos (Absolute) 0.87 (H) 0.00 - 0.85 K/uL    Eos (Absolute) 0.04 0.00 - 0.51 K/uL    Baso (Absolute) 0.02 0.00 - 0.12 K/uL    Immature Granulocytes (abs) 0.10 0.00 - 0.11 K/uL    NRBC (Absolute) 0.00 K/uL    Anisocytosis 2+ (A)     Macrocytosis 1+     Microcytosis 1+    COMP METABOLIC PANEL    Collection Time: 12/24/20  3:01 PM   Result Value Ref Range    Sodium 137 135 - 145 mmol/L    Potassium 4.3 3.6 - 5.5 mmol/L    Chloride 101 96 - 112 mmol/L    Co2 21 20 - 33 mmol/L    Anion Gap 15.0 7.0 - 16.0    Glucose 89 65 - 99 mg/dL    Bun 21 8 - 22 mg/dL    Creatinine 1.15 0.50 - 1.40 mg/dL    Calcium 9.0 8.5 - 10.5 mg/dL    AST(SGOT) 19 12 - 45 U/L    ALT(SGPT) 12 2 - 50 U/L    Alkaline Phosphatase 76 30 - 99 U/L    Total Bilirubin 0.3 0.1 - 1.5 mg/dL    Albumin 3.3 3.2 - 4.9 g/dL    Total Protein 5.9 (L) 6.0 - 8.2 g/dL    Globulin 2.6 1.9 - 3.5 g/dL    A-G Ratio 1.3 g/dL   COD (ADULT)    Collection Time: 12/24/20  3:01 PM   Result Value Ref Range    ABO Grouping Only O     Rh Grouping Only POS     Antibody Screen-Cod NEG     Component R       R33                 Red Blood Cells     [id number]   transfused   12/24/20    19:49      Product Type Red Blood Cells LR Pheresis     Dispense Status transfused     Unit Number (Barcoded) [id number]     Product Code (Barcoded) S7428V70     Blood Type (Barcoded) 5100    ESTIMATED GFR    Collection Time: 12/24/20  3:01 PM   Result Value Ref Range    GFR If African American >60 >60 mL/min/1.73 m 2    GFR If Non African American >60 >60 mL/min/1.73 m 2   PERIPHERAL SMEAR REVIEW    Collection Time: 12/24/20  3:01 PM   Result Value Ref Range    Peripheral Smear Review see below    PLATELET ESTIMATE    Collection Time: 12/24/20  3:01 PM   Result Value Ref Range    Plt Estimation Normal    MORPHOLOGY    Collection Time: 12/24/20  3:01 PM   Result Value Ref Range    RBC Morphology Present     Polychromia 1+     Poikilocytosis 1+     Ovalocytes 1+     Echinocytes 1+    DIFFERENTIAL COMMENT    Collection Time: 12/24/20  3:01 PM   Result Value Ref Range    Comments-Diff see below    proBrain Natriuretic Peptide, NT    Collection Time: 12/24/20  3:01 PM   Result Value Ref Range    NT-proBNP 1422 (H) 0 - 125 pg/mL   RETICULOCYTES COUNT    Collection Time: 12/24/20  3:01 PM   Result Value Ref Range    Reticulocyte Count 11.6 (H) 0.8 - 2.1 %    Retic, Absolute 0.27 (H) 0.04 - 0.06 M/uL    Imm. Reticulocyte Fraction 35.7 (H) 9.3 - 17.4 %    Retic Hgb Equivalent 28.4 (L) 29.0 - 35.0 pg/cell   IRON/TOTAL IRON BIND    Collection Time: 12/24/20  3:01 PM   Result Value Ref Range    Iron 70 50 - 180 ug/dL    Total Iron Binding 180 (L) 250 - 450 ug/dL    Unsat Iron Binding 110 110 - 370 ug/dL    % Saturation 39 15 - 55 %   FERRITIN    Collection Time: 12/24/20  3:01 PM   Result Value Ref Range    Ferritin 338.0 (H) 22.0 - 322.0 ng/mL   VITAMIN B12    Collection Time: 12/24/20  3:01 PM   Result Value Ref Range    Vitamin B12 -True Cobalamin 1063 (H) 211 - 911 pg/mL   FOLATE    Collection Time: 12/24/20  3:01 PM   Result Value Ref Range    Folate -Folic Acid 2.8 (A) >4.0 ng/mL   TSH WITH REFLEX TO FT4     Collection Time: 20  3:01 PM   Result Value Ref Range    TSH 2.150 0.380 - 5.330 uIU/mL   COVID/SARS CoV-2 PCR    Collection Time: 20  4:51 PM    Specimen: Nasopharyngeal; Respirate   Result Value Ref Range    COVID Order Status Received    SARS-CoV-2, PCR (In-House)    Collection Time: 20  4:51 PM   Result Value Ref Range    SARS-CoV-2 Source NP Swab    EKG    Collection Time: 20  7:01 PM   Result Value Ref Range    Report       [facility]    Test Date:  2020  Pt Name:    EMANUEL LOCKE              Department: ER  MRN:        [id number]                      Room:       T833  Gender:     Male                         Technician: [facility]  :        1945                   Requested By:MONTSE ZARAGOZA  Order #:    [id number]                    Reading MD: Tone Vitale MD    Measurements  Intervals                                Axis  Rate:       120                          P:          0  MA:         104                          QRS:        -34  QRSD:       94                           T:          181  QT:         388  QTc:        549    Interpretive Statements  SINUS TACHYCARDIA  ventricular couplet  Anterolateral T wave inversions- consider ischemia  Prolonged QTc  Electronically Signed On 2020 19:11:36 PST by Tone Vitale MD     ABO Rh Confirm    Collection Time: 20  7:07 PM   Result Value Ref Range    ABO Rh Confirm O POS    CBC with Differential    Collection Time: 20  6:05 AM   Result Value Ref Range    WBC 7.6 4.8 - 10.8 K/uL    RBC 2.38 (L) 4.70 - 6.10 M/uL    Hemoglobin 7.4 (L) 14.0 - 18.0 g/dL    Hematocrit 23.5 (L) 42.0 - 52.0 %    MCV 98.7 (H) 81.4 - 97.8 fL    MCH 31.1 27.0 - 33.0 pg    MCHC 31.5 (L) 33.7 - 35.3 g/dL    RDW 70.2 (H) 35.9 - 50.0 fL    Platelet Count 370 164 - 446 K/uL    MPV 9.8 9.0 - 12.9 fL    Neutrophils-Polys 57.80 44.00 - 72.00 %    Lymphocytes 30.90 22.00 - 41.00 %    Monocytes 7.40 0.00 - 13.40 %    Eosinophils  1.50 0.00 - 6.90 %    Basophils 0.40 0.00 - 1.80 %    Immature Granulocytes 2.00 (H) 0.00 - 0.90 %    Nucleated RBC 0.00 /100 WBC    Neutrophils (Absolute) 4.37 1.82 - 7.42 K/uL    Lymphs (Absolute) 2.33 1.00 - 4.80 K/uL    Monos (Absolute) 0.56 0.00 - 0.85 K/uL    Eos (Absolute) 0.11 0.00 - 0.51 K/uL    Baso (Absolute) 0.03 0.00 - 0.12 K/uL    Immature Granulocytes (abs) 0.15 (H) 0.00 - 0.11 K/uL    NRBC (Absolute) 0.00 K/uL   Comp Metabolic Panel (CMP)    Collection Time: 12/25/20  6:05 AM   Result Value Ref Range    Sodium 136 135 - 145 mmol/L    Potassium 4.3 3.6 - 5.5 mmol/L    Chloride 102 96 - 112 mmol/L    Co2 26 20 - 33 mmol/L    Anion Gap 8.0 7.0 - 16.0    Glucose 120 (H) 65 - 99 mg/dL    Bun 22 8 - 22 mg/dL    Creatinine 0.96 0.50 - 1.40 mg/dL    Calcium 8.1 (L) 8.5 - 10.5 mg/dL    AST(SGOT) 16 12 - 45 U/L    ALT(SGPT) 11 2 - 50 U/L    Alkaline Phosphatase 67 30 - 99 U/L    Total Bilirubin 0.5 0.1 - 1.5 mg/dL    Albumin 2.6 (L) 3.2 - 4.9 g/dL    Total Protein 4.6 (L) 6.0 - 8.2 g/dL    Globulin 2.0 1.9 - 3.5 g/dL    A-G Ratio 1.3 g/dL   IRON/TOTAL IRON BIND    Collection Time: 12/25/20  6:05 AM   Result Value Ref Range    Iron 62 50 - 180 ug/dL    Total Iron Binding 135 (L) 250 - 450 ug/dL    Unsat Iron Binding 73 (L) 110 - 370 ug/dL    % Saturation 46 15 - 55 %   Lipid Profile    Collection Time: 12/25/20  6:05 AM   Result Value Ref Range    Cholesterol,Tot 80 (L) 100 - 199 mg/dL    Triglycerides 163 (H) 0 - 149 mg/dL    HDL 26 (A) >=40 mg/dL    LDL 21 <100 mg/dL   ESTIMATED GFR    Collection Time: 12/25/20  6:05 AM   Result Value Ref Range    GFR If African American >60 >60 mL/min/1.73 m 2    GFR If Non African American >60 >60 mL/min/1.73 m 2       Imaging/Procedures Review:      No orders to display        MDM (Assessment and Plan):     Patient Active Problem List    Diagnosis Date Noted   • Symptomatic anemia 12/24/2020     Priority: High   • Paroxysmal atrial fibrillation (HCC) 12/08/2020      Priority: High   • Hypertension 12/25/2020   • Hyperlipidemia 12/25/2020   • BPH (benign prostatic hyperplasia) 12/25/2020   • Type 2 diabetes mellitus, with long-term current use of insulin (HCC) 12/24/2020   • Dysphagia 12/09/2020   • Left upper extremity swelling 12/08/2020   • Debility 12/07/2020       This is a pleasant 75-year-old male, transferred from a long-term living facility with symptomatic anemia-hemoglobin 6.5, dyspnea, melenic stool.  Approximately 2 months ago, he had an EGD for similar symptoms performe at the [facility]-findings of duodenal ulcer, esophagitis and positive H. pylori.  He was treated with 2 weeks of antibiotics and PPI medication.  Denies continuing the PPI medication.  Current hemoglobin7.4.  Hematocrit 23.5.  Platelet count 370,000.  Denies abdominal pain, nausea/vomiting, hematochezia.  He had one episode of melenic stool yesterday.  Rectal exam done by the ER physician-positive.  Denies ever having a colonoscopy.    1. Anemia: Hemoglobin 7.4.  One episode of melenic stool.  Concern for possible duodenal ulcer versus esophagitis bleed due to his history.  He may have lower GI bleed albeit very low.  No abdominal pain or bright red blood.  He has never had a colonoscopy. Patient had breakfast this morning.    -Start pantoprazole 40 mg IV push twice daily  -Continue to trend H&H-transfuse if hemoglobin less than 7  -Risk versus benefits of EGD/colonoscopy were discussed with patient.  Risks include heart and lung event due to anesthesia.  Also, risks of EGD/colonoscopy include bleeding, infection, perforation albeit very low.  Patient elected to proceed with both procedures.  Questions were answered.  -Clear liquid diet with no reds  -N.p.o. after midnight  -Movie prep x2    2.  Melenic stool-he had one episode of melenic stool prior to admission.  ER physician did a rectal exam-positive for blood.  Appears to be upper GI bleed however, cannot rule out right colon bleed.    3.  Unintentional weight loss: Patient has lost approx 20 lbs since September. States he does not feel hungry. Beginning of December, had four days of N/V. Recommend proceeding with a colonoscopy for further evaluation.           Thank your for the opportunity to assist in the care of your patient.  Please call for any questions or concerns.    MARY BETH Walker.

## 2020-12-25 NOTE — PROGRESS NOTES
Patient Covid Positive.  Initiated Isolation Protocol for Airborne and Droplet Precautions.  Educated patient.  Notified Charge RN, [facility] medical team, CNA, and Flex nurse.    Isolation Precautions:    Patient is on Airborne and Droplet isolation for Covid Positive.    Patient educated on reason for isolation, how the infection may be transmitted, and how to help prevent transmission to others.     Patient educated that Airborne and Droplet precautions involves staff and visitors wearing PPE, follow  Standard Precautions and perform meticulous hand hygiene in order to prevent transmission of infection. (Contact Precautions: gown and gloves; Special Contact Precautions: gown and gloves, with the added requirement of soap and water for hand hygiene; Droplet Precautions: surgical mask worn by staff and visitors in the room, and worn by the patient when out of the room; Airborne Precautions: involves staff wearing PPE to include an N95 Respirator or Controlled Air Purifying Respirator (CAPR).  Visitors should be limited and may wear an N95 mask).         Patient transport and mobilization on unit. Patient educated that they may leave their room, but prior to exiting, the patient needs to have on a fresh patient gown, ensure the potentially infectious area is covered, perform appropriate hand hygiene immediately prior to exiting the room. (*For Airborne Precautions: Patient educated that time out of the room should be minimized and limited to transport to diagnostic procedures or other activities. Patient is to wear surgical mask when required to leave the patient room).

## 2020-12-25 NOTE — ASSESSMENT & PLAN NOTE
Guaiac + in the ED  Required 2U PRBC during admission however Hgb has been stable since 12/26. Pt denies further melena  Cont PPI and carafate  Will defer EGD for now as seems to have resolved- was not done on admission d/t pt being COVID positive

## 2020-12-26 LAB
ALBUMIN SERPL BCP-MCNC: 2.7 G/DL (ref 3.2–4.9)
ALBUMIN/GLOB SERPL: 1.3 G/DL
ALP SERPL-CCNC: 64 U/L (ref 30–99)
ALT SERPL-CCNC: 14 U/L (ref 2–50)
ANION GAP SERPL CALC-SCNC: 7 MMOL/L (ref 7–16)
AST SERPL-CCNC: 26 U/L (ref 12–45)
BILIRUB SERPL-MCNC: 1.1 MG/DL (ref 0.1–1.5)
BUN SERPL-MCNC: 18 MG/DL (ref 8–22)
CALCIUM SERPL-MCNC: 8.3 MG/DL (ref 8.5–10.5)
CHLORIDE SERPL-SCNC: 100 MMOL/L (ref 96–112)
CO2 SERPL-SCNC: 25 MMOL/L (ref 20–33)
CREAT SERPL-MCNC: 0.93 MG/DL (ref 0.5–1.4)
ERYTHROCYTE [DISTWIDTH] IN BLOOD BY AUTOMATED COUNT: 67.2 FL (ref 35.9–50)
ERYTHROCYTE [DISTWIDTH] IN BLOOD BY AUTOMATED COUNT: 67.4 FL (ref 35.9–50)
ERYTHROCYTE [DISTWIDTH] IN BLOOD BY AUTOMATED COUNT: 69.9 FL (ref 35.9–50)
GLOBULIN SER CALC-MCNC: 2.1 G/DL (ref 1.9–3.5)
GLUCOSE BLD-MCNC: 117 MG/DL (ref 65–99)
GLUCOSE BLD-MCNC: 123 MG/DL (ref 65–99)
GLUCOSE BLD-MCNC: 144 MG/DL (ref 65–99)
GLUCOSE BLD-MCNC: 181 MG/DL (ref 65–99)
GLUCOSE BLD-MCNC: 283 MG/DL (ref 65–99)
GLUCOSE SERPL-MCNC: 117 MG/DL (ref 65–99)
HCT VFR BLD AUTO: 24.4 % (ref 42–52)
HCT VFR BLD AUTO: 27 % (ref 42–52)
HCT VFR BLD AUTO: 27.1 % (ref 42–52)
HEMOCCULT STL QL: POSITIVE
HGB BLD-MCNC: 8 G/DL (ref 14–18)
HGB BLD-MCNC: 8.5 G/DL (ref 14–18)
HGB BLD-MCNC: 8.6 G/DL (ref 14–18)
MAGNESIUM SERPL-MCNC: 1.4 MG/DL (ref 1.5–2.5)
MCH RBC QN AUTO: 30.5 PG (ref 27–33)
MCH RBC QN AUTO: 31.3 PG (ref 27–33)
MCH RBC QN AUTO: 31.5 PG (ref 27–33)
MCHC RBC AUTO-ENTMCNC: 31.4 G/DL (ref 33.7–35.3)
MCHC RBC AUTO-ENTMCNC: 31.9 G/DL (ref 33.7–35.3)
MCHC RBC AUTO-ENTMCNC: 32.8 G/DL (ref 33.7–35.3)
MCV RBC AUTO: 96.1 FL (ref 81.4–97.8)
MCV RBC AUTO: 97.1 FL (ref 81.4–97.8)
MCV RBC AUTO: 98.2 FL (ref 81.4–97.8)
PLATELET # BLD AUTO: 279 K/UL (ref 164–446)
PLATELET # BLD AUTO: 280 K/UL (ref 164–446)
PLATELET # BLD AUTO: 293 K/UL (ref 164–446)
PMV BLD AUTO: 10 FL (ref 9–12.9)
PMV BLD AUTO: 10.1 FL (ref 9–12.9)
PMV BLD AUTO: 10.2 FL (ref 9–12.9)
POTASSIUM SERPL-SCNC: 4 MMOL/L (ref 3.6–5.5)
PREALB SERPL-MCNC: 21.1 MG/DL (ref 18–38)
PROT SERPL-MCNC: 4.8 G/DL (ref 6–8.2)
RBC # BLD AUTO: 2.54 M/UL (ref 4.7–6.1)
RBC # BLD AUTO: 2.75 M/UL (ref 4.7–6.1)
RBC # BLD AUTO: 2.79 M/UL (ref 4.7–6.1)
SODIUM SERPL-SCNC: 132 MMOL/L (ref 135–145)
WBC # BLD AUTO: 5.6 K/UL (ref 4.8–10.8)
WBC # BLD AUTO: 5.8 K/UL (ref 4.8–10.8)
WBC # BLD AUTO: 7.8 K/UL (ref 4.8–10.8)

## 2020-12-26 PROCEDURE — A9270 NON-COVERED ITEM OR SERVICE: HCPCS | Performed by: GENERAL PRACTICE

## 2020-12-26 PROCEDURE — 86923 COMPATIBILITY TEST ELECTRIC: CPT

## 2020-12-26 PROCEDURE — 700105 HCHG RX REV CODE 258: Performed by: STUDENT IN AN ORGANIZED HEALTH CARE EDUCATION/TRAINING PROGRAM

## 2020-12-26 PROCEDURE — 700102 HCHG RX REV CODE 250 W/ 637 OVERRIDE(OP): Performed by: STUDENT IN AN ORGANIZED HEALTH CARE EDUCATION/TRAINING PROGRAM

## 2020-12-26 PROCEDURE — 80053 COMPREHEN METABOLIC PANEL: CPT

## 2020-12-26 PROCEDURE — 99233 SBSQ HOSP IP/OBS HIGH 50: CPT | Performed by: STUDENT IN AN ORGANIZED HEALTH CARE EDUCATION/TRAINING PROGRAM

## 2020-12-26 PROCEDURE — 83735 ASSAY OF MAGNESIUM: CPT

## 2020-12-26 PROCEDURE — P9016 RBC LEUKOCYTES REDUCED: HCPCS

## 2020-12-26 PROCEDURE — 700102 HCHG RX REV CODE 250 W/ 637 OVERRIDE(OP): Performed by: GENERAL PRACTICE

## 2020-12-26 PROCEDURE — 770020 HCHG ROOM/CARE - TELE (206)

## 2020-12-26 PROCEDURE — 82962 GLUCOSE BLOOD TEST: CPT

## 2020-12-26 PROCEDURE — A9270 NON-COVERED ITEM OR SERVICE: HCPCS | Performed by: STUDENT IN AN ORGANIZED HEALTH CARE EDUCATION/TRAINING PROGRAM

## 2020-12-26 PROCEDURE — 85027 COMPLETE CBC AUTOMATED: CPT | Mod: 91

## 2020-12-26 PROCEDURE — 700111 HCHG RX REV CODE 636 W/ 250 OVERRIDE (IP): Performed by: STUDENT IN AN ORGANIZED HEALTH CARE EDUCATION/TRAINING PROGRAM

## 2020-12-26 PROCEDURE — 36430 TRANSFUSION BLD/BLD COMPNT: CPT

## 2020-12-26 PROCEDURE — 84134 ASSAY OF PREALBUMIN: CPT

## 2020-12-26 PROCEDURE — 82272 OCCULT BLD FECES 1-3 TESTS: CPT

## 2020-12-26 PROCEDURE — 36415 COLL VENOUS BLD VENIPUNCTURE: CPT

## 2020-12-26 RX ORDER — MAGNESIUM SULFATE HEPTAHYDRATE 40 MG/ML
4 INJECTION, SOLUTION INTRAVENOUS ONCE
Status: COMPLETED | OUTPATIENT
Start: 2020-12-26 | End: 2020-12-26

## 2020-12-26 RX ORDER — SODIUM CHLORIDE, SODIUM LACTATE, POTASSIUM CHLORIDE, AND CALCIUM CHLORIDE .6; .31; .03; .02 G/100ML; G/100ML; G/100ML; G/100ML
500 INJECTION, SOLUTION INTRAVENOUS ONCE
Status: COMPLETED | OUTPATIENT
Start: 2020-12-26 | End: 2020-12-26

## 2020-12-26 RX ADMIN — ATORVASTATIN CALCIUM 40 MG: 40 TABLET, FILM COATED ORAL at 20:45

## 2020-12-26 RX ADMIN — Medication 1000 UNITS: at 06:05

## 2020-12-26 RX ADMIN — INSULIN LISPRO 3 UNITS: 100 INJECTION, SOLUTION INTRAVENOUS; SUBCUTANEOUS at 13:23

## 2020-12-26 RX ADMIN — MAGNESIUM SULFATE IN WATER 4 G: 40 INJECTION, SOLUTION INTRAVENOUS at 07:41

## 2020-12-26 RX ADMIN — OMEPRAZOLE 20 MG: 20 CAPSULE, DELAYED RELEASE ORAL at 06:05

## 2020-12-26 RX ADMIN — SUCRALFATE 1 G: 1 TABLET ORAL at 18:40

## 2020-12-26 RX ADMIN — SUCRALFATE 1 G: 1 TABLET ORAL at 06:05

## 2020-12-26 RX ADMIN — OMEPRAZOLE 20 MG: 20 CAPSULE, DELAYED RELEASE ORAL at 18:40

## 2020-12-26 RX ADMIN — TAMSULOSIN HYDROCHLORIDE 0.4 MG: 0.4 CAPSULE ORAL at 09:40

## 2020-12-26 RX ADMIN — SUCRALFATE 1 G: 1 TABLET ORAL at 01:35

## 2020-12-26 RX ADMIN — CYANOCOBALAMIN TAB 500 MCG 1000 MCG: 500 TAB at 06:13

## 2020-12-26 RX ADMIN — INSULIN LISPRO 1 UNITS: 100 INJECTION, SOLUTION INTRAVENOUS; SUBCUTANEOUS at 20:45

## 2020-12-26 RX ADMIN — METOPROLOL TARTRATE 25 MG: 25 TABLET, FILM COATED ORAL at 06:13

## 2020-12-26 RX ADMIN — DOCUSATE SODIUM 50 MG AND SENNOSIDES 8.6 MG 2 TABLET: 8.6; 5 TABLET, FILM COATED ORAL at 06:05

## 2020-12-26 RX ADMIN — SODIUM CHLORIDE, POTASSIUM CHLORIDE, SODIUM LACTATE AND CALCIUM CHLORIDE: 600; 310; 30; 20 INJECTION, SOLUTION INTRAVENOUS at 04:07

## 2020-12-26 RX ADMIN — FOLIC ACID 1 MG: 1 TABLET ORAL at 06:05

## 2020-12-26 RX ADMIN — SUCRALFATE 1 G: 1 TABLET ORAL at 13:22

## 2020-12-26 ASSESSMENT — ENCOUNTER SYMPTOMS
GASTROINTESTINAL NEGATIVE: 1
HEADACHES: 1
DIZZINESS: 1
RESPIRATORY NEGATIVE: 1
PSYCHIATRIC NEGATIVE: 1
EYES NEGATIVE: 1
PALPITATIONS: 1

## 2020-12-26 ASSESSMENT — PAIN DESCRIPTION - PAIN TYPE: TYPE: OTHER (COMMENT)

## 2020-12-26 NOTE — PROGRESS NOTES
Monitor summary:     0.16/0.10/0.36     Sinus Rhythm/Sinus Tachycardia 92 - 111     with rPACs, rPVCs

## 2020-12-26 NOTE — CARE PLAN
Problem: Nutritional:  Goal: Achieve adequate nutritional intake  Description: Patient will consume >50% of meals  Outcome: PROGRESSING AS EXPECTED   See RD note for details.

## 2020-12-26 NOTE — PROGRESS NOTES
[facility] Medicine Daily Progress Note    Date of Service  12/26/2020    Chief Complaint  75 y.o. male admitted 12/24/2020 with Symptomatic Anemia    Hospital Course  75-year-old male with a past medical history of atrial fibrillation on metoprolol, dyslipidemia on atorvastatin, diabetes on Metformin/glipizide and chronic history of slow GI bleed was transferred from [facility] for symptomatic anemia with hemoglobin below 7.  At the emergency department vital signs showing tachycardia and tachypnea.  Patient saturating well room air.  CBC with macrocytic anemia and elevated RDW.  No leukocytosis.  Dementia without chronic abnormalities.  Patient was admitted to telemetry for symptomatic anemia requiring blood transfusion.    Interval Problem Update    12/26-Patient examined at bedside. No overnight complaints. FOBT, Iron Studies and B12/Folate still pending. GI saw patient and stated that patient stopped his PPI therapy after completing H. Pylori therapy. Most likely suffering from recurrent esophagitis. Can be managed with oral PPI BID. [facility] and are negative.     Consultants/Specialty  Gastroenterology    Code Status  Full Code    [facility]  [facility]    Review of Systems  Review of Systems   Constitutional: Positive for malaise/fatigue.   HENT: Negative.    Eyes: Negative.    Respiratory: Negative.    Cardiovascular: Positive for palpitations.   Gastrointestinal: Negative.    Genitourinary: Negative.    Skin: Negative.    Neurological: Positive for dizziness and headaches.   Endo/Heme/Allergies: Negative.    Psychiatric/Behavioral: Negative.         Physical Exam  Temp:  [36 °C (96.8 °F)-36.6 °C (97.9 °F)] 36.2 °C (97.1 °F)  Pulse:  [] 74  Resp:  [16-18] 18  BP: ()/(34-66) 104/57  SpO2:  [91 %-98 %] 97 %    Physical Exam  Vitals signs reviewed.   HENT:      Head: Normocephalic and atraumatic.      Right Ear: External ear  normal.      Left Ear: External ear normal.      Nose: Nose normal.      Mouth/Throat:      Mouth: Mucous membranes are moist.   Eyes:      Pupils: Pupils are equal, round, and reactive to light.   Neck:      Musculoskeletal: Neck supple.   Cardiovascular:      Rate and Rhythm: Normal rate and regular rhythm.      Pulses: Normal pulses.      Heart sounds: Normal heart sounds.   Pulmonary:      Effort: Pulmonary effort is normal.      Breath sounds: Normal breath sounds.   Abdominal:      General: Abdomen is flat. Bowel sounds are normal.   Musculoskeletal: Normal range of motion.   Skin:     General: Skin is warm.      Capillary Refill: Capillary refill takes less than 2 seconds.   Neurological:      General: No focal deficit present.      Mental Status: He is alert.   Psychiatric:         Mood and Affect: Mood normal.         Fluids    Intake/Output Summary (Last 24 hours) at 12/26/2020 0911  Last data filed at 12/26/2020 0546  Gross per 24 hour   Intake --   Output 1550 ml   Net -1550 ml       Laboratory  Recent Labs     12/25/20  1446 12/25/20  2228 12/26/20  0524   WBC 8.2 7.2 7.8   RBC 2.44* 2.19* 2.75*   HEMOGLOBIN 7.6* 6.9* 8.6*   HEMATOCRIT 24.3* 21.5* 27.0*   MCV 99.6* 98.2* 98.2*   MCH 31.1 31.5 31.3   MCHC 31.3* 32.1* 31.9*   RDW 71.7* 69.1* 67.2*   PLATELETCT 346 315 279   MPV 9.6 9.8 10.0     Recent Labs     12/24/20  1501 12/25/20  0605 12/26/20  0405   SODIUM 137 136 132*   POTASSIUM 4.3 4.3 4.0   CHLORIDE 101 102 100   CO2 21 26 25   GLUCOSE 89 120* 117*   BUN 21 22 18   CREATININE 1.15 0.96 0.93   CALCIUM 9.0 8.1* 8.3*             Recent Labs     12/25/20  0605   TRIGLYCERIDE 163*   HDL 26*   LDL 21       Imaging  DX-CHEST-PORTABLE (1 VIEW)   Final Result      No evidence of acute cardiopulmonary process.           Assessment/Plan  * Symptomatic anemia  Assessment & Plan  Patient reporting dyspnea on exertion, and orthostatic changes. Guaiac + in the ED. Came from nursing home with HgB 6.5, was 7.9   In the ED, now 7.4 this morning. Had one unit of pRBC transfused yesterday.  -continue IVF  -H/H Q8H   -continue Omeprazole 20mg BID   -continue Carafate 1gm Q6H  -transfuse if HgB <7  -GI consulted, Dr. Noel, had planned to do an EGD and Colonoscopy tomorrow, but per GI, the [facility] or call [facility] when he has cleared COVID. Endoscopy is not a consideration until he clears COVID.     COVID-19  Assessment & Plan  + test after coming to the floor.  -airborne precautions placed  -per bed manager, patient will self isolate in theon Telemetry 8 where he is now since the patient is asymptomatic.    Paroxysmal atrial fibrillation (HCC)- (present on admission)  Assessment & Plan  Echo 10/27/20:EF 60-65%. Afib: CHADSVASC 4: annual risk of stroke 4.8% (>75, HTN, Dm2).HAS-BLED score 2: moderate risk of bleeding.   -hold Eliquis and all blood thinners  -continue Metoprolol as prescribed  -telemetry      Malnutrition (HCC)  Assessment & Plan  Albumin 2.6  -prealbumin  -nutrition consult    Folic acid deficiency  Assessment & Plan  < 2.0  -add folic acid 1mg daily. Likely due to poor nutrition    Hyperlipidemia  Assessment & Plan  Continue home Lipitor     Hypertension  Assessment & Plan  /80 most recently  -restart home Lisinopril/HCTZ and Carvedilol  -CTM    Type 2 diabetes mellitus, with long-term current use of insulin (HCC)  Assessment & Plan  A1c 7.1% December 2020.  -hold Metformin, Alogliptin, Glipizide, and Humulin  -ISS  -hypoglycemic protocol  -accu checks  -diabetic diet      Debility- (present on admission)  Assessment & Plan  Secondary to symptomatic anemia  PT/OT    Urinary retention  Assessment & Plan  During admission to [facility] earlier December 2020., Noted to have urinary retention and Porter catheter was placed during this hospital stay.  Upon discharge recommendations were made for [facility] staff to remove Porter catheter and attempt voiding trials.  Has been receiving  Flomax while in hospital.  Evaluated by physical and patient therapy recommended SNF placement.  -continue crooks and Tamsulosin  -will need outpatient follow up with Urology at the [facility]           VTE prophylaxis: Lovenox

## 2020-12-26 NOTE — PROGRESS NOTES
Patient hgb 6.9  On call physician notified of HGB and low B/P 80/41  Orders for blood put in and monitor B/P

## 2020-12-26 NOTE — DIETARY
"Nutrition services: Day 2 of admit.  Lakhwinder Vega is a 75 y.o. male with admitting DX of symptomatic anemia.   Consult received for poor oral intake. Pt additionally with malnutrition dx.     Attempted to call pt via room phone and cell phone number listed in chart w/o response.     Assessment:  Height: 172.7 cm (5' 8\")  Weight: 77.6 kg (171 lb) via stand up scale  Body mass index is 26 kg/m²., BMI classification: overweight  Diet/Intake: Cardiac; % x1 meal. Per RN pt ate ~50% of breakfast and 50-75% of lunch today.     Evaluation:   1. Per GI note yesterday: \"Patient has lost approx 20 lbs since September. States he does not feel hungry. Beginning of December, had four days of N/V.\" Per MD note yesterday pt noted with 40 lb wt loss since September.   2. Plan was for pt to have EGD/colonoscopy tomorrow, however, this is being held at this time d/t COVID+ status.  3. Per chart review pt wt was 73.4 - 77.3 kg via bed scale on last admit between 12/3 - 12/5. Current wt is slightly increased from admission 3 weeks ago. No other wt hx to evaluate.    4. RD unable to perform nutrition focused physical exam as per current department guidelines dietary staff not permitted to enter droplet isolation rooms at this time.   5. Pt w/ hx of T2DM. Glucose range since admit: 89 - 181 mg/dl.   6. Other labs: sodium 132, albumin 2.7, magnesium 1.4, pre-albumin 21.1 (WNL)  7. Meds: vitamin B-12, folvite, SSI, lactated ringers infusion, prilosec, bowel protocol in place, sucralfate, vitamin D, magnesium sulfate (completed)      Malnutrition Risk: Poor PO intake and wt loss noted per MD notes though unable to fully assess at this time.     Recommendations/Plan:   1. RD to monitor for need to add consistent CHO diet restriction.   2. Encourage intake of meals  3. Document intake of all meals as % taken in ADL's to provide interdisciplinary communication across all shifts.   4. Monitor weight.  5. Nutrition rep will continue to " call patient for ongoing meal and snack preferences.     RD following for adequate PO intake.

## 2020-12-26 NOTE — PROGRESS NOTES
Report given to JEREL Zimmer and patient transported with belongings and chart and medications to [place] [id number].

## 2020-12-27 PROBLEM — R13.10 DYSPHAGIA: Status: RESOLVED | Noted: 2020-12-09 | Resolved: 2020-12-27

## 2020-12-27 LAB
ANION GAP SERPL CALC-SCNC: 9 MMOL/L (ref 7–16)
BASOPHILS # BLD AUTO: 0.5 % (ref 0–1.8)
BASOPHILS # BLD AUTO: 0.9 % (ref 0–1.8)
BASOPHILS # BLD: 0.03 K/UL (ref 0–0.12)
BASOPHILS # BLD: 0.05 K/UL (ref 0–0.12)
BUN SERPL-MCNC: 15 MG/DL (ref 8–22)
CALCIUM SERPL-MCNC: 7.8 MG/DL (ref 8.5–10.5)
CHLORIDE SERPL-SCNC: 100 MMOL/L (ref 96–112)
CO2 SERPL-SCNC: 25 MMOL/L (ref 20–33)
CREAT SERPL-MCNC: 0.82 MG/DL (ref 0.5–1.4)
EOSINOPHIL # BLD AUTO: 0.14 K/UL (ref 0–0.51)
EOSINOPHIL # BLD AUTO: 0.21 K/UL (ref 0–0.51)
EOSINOPHIL NFR BLD: 2.5 % (ref 0–6.9)
EOSINOPHIL NFR BLD: 3.5 % (ref 0–6.9)
ERYTHROCYTE [DISTWIDTH] IN BLOOD BY AUTOMATED COUNT: 67.9 FL (ref 35.9–50)
ERYTHROCYTE [DISTWIDTH] IN BLOOD BY AUTOMATED COUNT: 68.4 FL (ref 35.9–50)
ERYTHROCYTE [DISTWIDTH] IN BLOOD BY AUTOMATED COUNT: 68.6 FL (ref 35.9–50)
ERYTHROCYTE [DISTWIDTH] IN BLOOD BY AUTOMATED COUNT: 69.4 FL (ref 35.9–50)
GLUCOSE BLD-MCNC: 123 MG/DL (ref 65–99)
GLUCOSE BLD-MCNC: 165 MG/DL (ref 65–99)
GLUCOSE BLD-MCNC: 183 MG/DL (ref 65–99)
GLUCOSE BLD-MCNC: 188 MG/DL (ref 65–99)
GLUCOSE BLD-MCNC: 211 MG/DL (ref 65–99)
GLUCOSE BLD-MCNC: 219 MG/DL (ref 65–99)
GLUCOSE SERPL-MCNC: 148 MG/DL (ref 65–99)
HCT VFR BLD AUTO: 25.3 % (ref 42–52)
HCT VFR BLD AUTO: 25.6 % (ref 42–52)
HCT VFR BLD AUTO: 25.9 % (ref 42–52)
HCT VFR BLD AUTO: 27.2 % (ref 42–52)
HGB BLD-MCNC: 8 G/DL (ref 14–18)
HGB BLD-MCNC: 8.2 G/DL (ref 14–18)
HGB BLD-MCNC: 8.3 G/DL (ref 14–18)
HGB BLD-MCNC: 8.7 G/DL (ref 14–18)
IMM GRANULOCYTES # BLD AUTO: 0.12 K/UL (ref 0–0.11)
IMM GRANULOCYTES # BLD AUTO: 0.12 K/UL (ref 0–0.11)
IMM GRANULOCYTES NFR BLD AUTO: 2 % (ref 0–0.9)
IMM GRANULOCYTES NFR BLD AUTO: 2.1 % (ref 0–0.9)
LYMPHOCYTES # BLD AUTO: 1.69 K/UL (ref 1–4.8)
LYMPHOCYTES # BLD AUTO: 2.01 K/UL (ref 1–4.8)
LYMPHOCYTES NFR BLD: 29.8 % (ref 22–41)
LYMPHOCYTES NFR BLD: 33.7 % (ref 22–41)
MCH RBC QN AUTO: 30.7 PG (ref 27–33)
MCH RBC QN AUTO: 30.8 PG (ref 27–33)
MCH RBC QN AUTO: 31.5 PG (ref 27–33)
MCH RBC QN AUTO: 31.9 PG (ref 27–33)
MCHC RBC AUTO-ENTMCNC: 31.6 G/DL (ref 33.7–35.3)
MCHC RBC AUTO-ENTMCNC: 31.7 G/DL (ref 33.7–35.3)
MCHC RBC AUTO-ENTMCNC: 32 G/DL (ref 33.7–35.3)
MCHC RBC AUTO-ENTMCNC: 32.4 G/DL (ref 33.7–35.3)
MCV RBC AUTO: 97 FL (ref 81.4–97.8)
MCV RBC AUTO: 97.3 FL (ref 81.4–97.8)
MCV RBC AUTO: 98.5 FL (ref 81.4–97.8)
MCV RBC AUTO: 98.6 FL (ref 81.4–97.8)
MONOCYTES # BLD AUTO: 0.67 K/UL (ref 0–0.85)
MONOCYTES # BLD AUTO: 0.76 K/UL (ref 0–0.85)
MONOCYTES NFR BLD AUTO: 11.2 % (ref 0–13.4)
MONOCYTES NFR BLD AUTO: 13.4 % (ref 0–13.4)
NEUTROPHILS # BLD AUTO: 2.91 K/UL (ref 1.82–7.42)
NEUTROPHILS # BLD AUTO: 2.93 K/UL (ref 1.82–7.42)
NEUTROPHILS NFR BLD: 49.1 % (ref 44–72)
NEUTROPHILS NFR BLD: 51.3 % (ref 44–72)
NRBC # BLD AUTO: 0 K/UL
NRBC # BLD AUTO: 0 K/UL
NRBC BLD-RTO: 0 /100 WBC
NRBC BLD-RTO: 0 /100 WBC
PLATELET # BLD AUTO: 266 K/UL (ref 164–446)
PLATELET # BLD AUTO: 276 K/UL (ref 164–446)
PLATELET # BLD AUTO: 291 K/UL (ref 164–446)
PLATELET # BLD AUTO: 292 K/UL (ref 164–446)
PMV BLD AUTO: 10 FL (ref 9–12.9)
PMV BLD AUTO: 10.1 FL (ref 9–12.9)
PMV BLD AUTO: 10.2 FL (ref 9–12.9)
PMV BLD AUTO: 10.3 FL (ref 9–12.9)
POTASSIUM SERPL-SCNC: 3.7 MMOL/L (ref 3.6–5.5)
RBC # BLD AUTO: 2.6 M/UL (ref 4.7–6.1)
RBC # BLD AUTO: 2.6 M/UL (ref 4.7–6.1)
RBC # BLD AUTO: 2.67 M/UL (ref 4.7–6.1)
RBC # BLD AUTO: 2.76 M/UL (ref 4.7–6.1)
SODIUM SERPL-SCNC: 134 MMOL/L (ref 135–145)
WBC # BLD AUTO: 5.6 K/UL (ref 4.8–10.8)
WBC # BLD AUTO: 5.7 K/UL (ref 4.8–10.8)
WBC # BLD AUTO: 6 K/UL (ref 4.8–10.8)
WBC # BLD AUTO: 7.7 K/UL (ref 4.8–10.8)

## 2020-12-27 PROCEDURE — 700102 HCHG RX REV CODE 250 W/ 637 OVERRIDE(OP): Performed by: STUDENT IN AN ORGANIZED HEALTH CARE EDUCATION/TRAINING PROGRAM

## 2020-12-27 PROCEDURE — 99233 SBSQ HOSP IP/OBS HIGH 50: CPT | Performed by: STUDENT IN AN ORGANIZED HEALTH CARE EDUCATION/TRAINING PROGRAM

## 2020-12-27 PROCEDURE — 85025 COMPLETE CBC W/AUTO DIFF WBC: CPT | Mod: 91

## 2020-12-27 PROCEDURE — 700102 HCHG RX REV CODE 250 W/ 637 OVERRIDE(OP): Performed by: GENERAL PRACTICE

## 2020-12-27 PROCEDURE — 85027 COMPLETE CBC AUTOMATED: CPT

## 2020-12-27 PROCEDURE — A9270 NON-COVERED ITEM OR SERVICE: HCPCS | Performed by: STUDENT IN AN ORGANIZED HEALTH CARE EDUCATION/TRAINING PROGRAM

## 2020-12-27 PROCEDURE — 80048 BASIC METABOLIC PNL TOTAL CA: CPT

## 2020-12-27 PROCEDURE — A9270 NON-COVERED ITEM OR SERVICE: HCPCS | Performed by: GENERAL PRACTICE

## 2020-12-27 PROCEDURE — 82962 GLUCOSE BLOOD TEST: CPT | Mod: 91

## 2020-12-27 PROCEDURE — 700105 HCHG RX REV CODE 258: Performed by: STUDENT IN AN ORGANIZED HEALTH CARE EDUCATION/TRAINING PROGRAM

## 2020-12-27 PROCEDURE — 36415 COLL VENOUS BLD VENIPUNCTURE: CPT

## 2020-12-27 PROCEDURE — 770020 HCHG ROOM/CARE - TELE (206)

## 2020-12-27 RX ADMIN — INSULIN LISPRO 2 UNITS: 100 INJECTION, SOLUTION INTRAVENOUS; SUBCUTANEOUS at 13:09

## 2020-12-27 RX ADMIN — DOCUSATE SODIUM 50 MG AND SENNOSIDES 8.6 MG 2 TABLET: 8.6; 5 TABLET, FILM COATED ORAL at 05:45

## 2020-12-27 RX ADMIN — METOPROLOL TARTRATE 25 MG: 25 TABLET, FILM COATED ORAL at 05:45

## 2020-12-27 RX ADMIN — TAMSULOSIN HYDROCHLORIDE 0.4 MG: 0.4 CAPSULE ORAL at 08:47

## 2020-12-27 RX ADMIN — SUCRALFATE 1 G: 1 TABLET ORAL at 00:56

## 2020-12-27 RX ADMIN — SODIUM CHLORIDE, POTASSIUM CHLORIDE, SODIUM LACTATE AND CALCIUM CHLORIDE: 600; 310; 30; 20 INJECTION, SOLUTION INTRAVENOUS at 21:06

## 2020-12-27 RX ADMIN — SUCRALFATE 1 G: 1 TABLET ORAL at 05:45

## 2020-12-27 RX ADMIN — SODIUM CHLORIDE, POTASSIUM CHLORIDE, SODIUM LACTATE AND CALCIUM CHLORIDE: 600; 310; 30; 20 INJECTION, SOLUTION INTRAVENOUS at 08:16

## 2020-12-27 RX ADMIN — CYANOCOBALAMIN TAB 500 MCG 1000 MCG: 500 TAB at 05:45

## 2020-12-27 RX ADMIN — INSULIN LISPRO 2 UNITS: 100 INJECTION, SOLUTION INTRAVENOUS; SUBCUTANEOUS at 21:00

## 2020-12-27 RX ADMIN — INSULIN LISPRO 1 UNITS: 100 INJECTION, SOLUTION INTRAVENOUS; SUBCUTANEOUS at 08:50

## 2020-12-27 RX ADMIN — Medication 1000 UNITS: at 05:45

## 2020-12-27 RX ADMIN — SUCRALFATE 1 G: 1 TABLET ORAL at 13:08

## 2020-12-27 RX ADMIN — ATORVASTATIN CALCIUM 40 MG: 40 TABLET, FILM COATED ORAL at 21:00

## 2020-12-27 RX ADMIN — INSULIN LISPRO 1 UNITS: 100 INJECTION, SOLUTION INTRAVENOUS; SUBCUTANEOUS at 17:37

## 2020-12-27 RX ADMIN — OMEPRAZOLE 20 MG: 20 CAPSULE, DELAYED RELEASE ORAL at 17:35

## 2020-12-27 RX ADMIN — FOLIC ACID 1 MG: 1 TABLET ORAL at 05:45

## 2020-12-27 RX ADMIN — SUCRALFATE 1 G: 1 TABLET ORAL at 17:35

## 2020-12-27 RX ADMIN — OMEPRAZOLE 20 MG: 20 CAPSULE, DELAYED RELEASE ORAL at 05:45

## 2020-12-27 ASSESSMENT — ENCOUNTER SYMPTOMS
DIZZINESS: 1
HEADACHES: 1
RESPIRATORY NEGATIVE: 1
PALPITATIONS: 1
EYES NEGATIVE: 1
PSYCHIATRIC NEGATIVE: 1
GASTROINTESTINAL NEGATIVE: 1

## 2020-12-27 NOTE — PROGRESS NOTES
[facility] Medicine Daily Progress Note    Date of Service  12/27/2020    Chief Complaint  75 y.o. male admitted 12/24/2020 with Symptomatic Anemia    Hospital Course  75-year-old male with a past medical history of atrial fibrillation on metoprolol, dyslipidemia on atorvastatin, diabetes on Metformin/glipizide and chronic history of slow GI bleed was [facility] for symptomatic anemia with hemoglobin below 7.  At the emergency department vital signs showing tachycardia and tachypnea.  Patient saturating well room air.  CBC with macrocytic anemia and elevated RDW.  No leukocytosis.  Dementia without chronic abnormalities.  Patient was admitted to telemetry for symptomatic anemia requiring blood transfusion. Patients FOBT came back positive and his Hg continuously dropped after transfusion from 8.6-->8.5-->8.0 transfused 1UPRBC overnight and today is up slightly to 8.2. Did not feel comfortable discharging him as suspicion for UGIB is still strong. Will trend CBC Q6H today and will transfuse if <7. GI will not scope if COVID Positive. Will repeat test in 2 days. If negative, will proceed with EGD. Once Hg stable and no signs of bleeding can be transferred back to the [facility].     Interval Problem Update    12/26-Patient examined at bedside. No overnight complaints. FOBT, Iron Studies and B12/Folate still pending. GI saw patient and stated that patient stopped his PPI therapy after completing H. Pylori therapy. Most likely suffering from recurrent esophagitis. Can be managed with oral PPI BID. [facility] come [facility] and are negative.     12/27-Patients FOBT came back positive and his Hg continuously dropped after transfusion from 8.6-->8.5-->8.0 transfused 1UPRBC overnight and today is up slightly to 8.2. Will trend CBC Q6H today and will transfuse if <7. GI will not scope if COVID Positive. Will repeat test in 2 days. If negative, will proceed with EGD. Once Hg  stable and no signs of bleeding [facility].     Consultants/Specialty  Gastroenterology    Code Status  Full Code    [facility]  [facility]    Review of Systems  Review of Systems   Constitutional: Positive for malaise/fatigue.   HENT: Negative.    Eyes: Negative.    Respiratory: Negative.    Cardiovascular: Positive for palpitations.   Gastrointestinal: Negative.    Genitourinary: Negative.    Skin: Negative.    Neurological: Positive for dizziness and headaches.   Endo/Heme/Allergies: Negative.    Psychiatric/Behavioral: Negative.         Physical Exam  Temp:  [36.3 °C (97.4 °F)-37.1 °C (98.8 °F)] 37.1 °C (98.8 °F)  Pulse:  [73-97] 74  Resp:  [17-18] 17  BP: ()/(48-66) 102/56  SpO2:  [90 %-98 %] 95 %    Physical Exam  Vitals signs reviewed.   HENT:      Head: Normocephalic and atraumatic.      Right Ear: External ear normal.      Left Ear: External ear normal.      Nose: Nose normal.      Mouth/Throat:      Mouth: Mucous membranes are moist.   Eyes:      Pupils: Pupils are equal, round, and reactive to light.   Neck:      Musculoskeletal: Neck supple.   Cardiovascular:      Rate and Rhythm: Normal rate and regular rhythm.      Pulses: Normal pulses.      Heart sounds: Normal heart sounds.   Pulmonary:      Effort: Pulmonary effort is normal.      Breath sounds: Normal breath sounds.   Abdominal:      General: Abdomen is flat. Bowel sounds are normal.   Musculoskeletal: Normal range of motion.   Skin:     General: Skin is warm.      Capillary Refill: Capillary refill takes less than 2 seconds.   Neurological:      General: No focal deficit present.      Mental Status: He is alert.   Psychiatric:         Mood and Affect: Mood normal.         Fluids    Intake/Output Summary (Last 24 hours) at 12/27/2020 0841  Last data filed at 12/27/2020 0600  Gross per 24 hour   Intake --   Output 1200 ml   Net -1200 ml       Laboratory  Recent Labs     12/26/20  2454  12/26/20  2225 12/27/20  0638   WBC 5.8 5.6 5.6   RBC 2.79* 2.54* 2.67*   HEMOGLOBIN 8.5* 8.0* 8.2*   HEMATOCRIT 27.1* 24.4* 25.9*   MCV 97.1 96.1 97.0   MCH 30.5 31.5 30.7   MCHC 31.4* 32.8* 31.7*   RDW 69.9* 67.4* 68.6*   PLATELETCT 293 280 292   MPV 10.1 10.2 10.3     Recent Labs     12/25/20  0605 12/26/20  0405 12/27/20  0638   SODIUM 136 132* 134*   POTASSIUM 4.3 4.0 3.7   CHLORIDE 102 100 100   CO2 26 25 25   GLUCOSE 120* 117* 148*   BUN 22 18 15   CREATININE 0.96 0.93 0.82   CALCIUM 8.1* 8.3* 7.8*             Recent Labs     12/25/20  0605   TRIGLYCERIDE 163*   HDL 26*   LDL 21       Imaging  DX-CHEST-PORTABLE (1 VIEW)   Final Result      No evidence of acute cardiopulmonary process.           Assessment/Plan  * Symptomatic anemia  Assessment & Plan  Patient reporting dyspnea on exertion, and orthostatic changes. Guaiac + in the ED. Came from nursing home with HgB 6.5, was 7.9  In the ED, now 7.4 this morning. Had one unit of pRBC transfused yesterday.  -continue IVF  -H/H Q8H   -continue Omeprazole 20mg BID   -continue Carafate 1gm Q6H  -transfuse if HgB <7  -GI consulted, Dr. Noel, had planned to do an EGD and Colonoscopy tomorrow, but per GI, the patient [facility] or call [facility] when he has cleared COVID. Endoscopy is not a consideration until he clears COVID.     COVID-19  Assessment & Plan  + test after coming to the floor.  -airborne precautions placed  -per bed manager, patient will self isolate in theon Telemetry 8 where he is now since the patient is asymptomatic.    Paroxysmal atrial fibrillation (HCC)- (present on admission)  Assessment & Plan  Echo 10/27/20:EF 60-65%. Afib: CHADSVASC 4: annual risk of stroke 4.8% (>75, HTN, Dm2).HAS-BLED score 2: moderate risk of bleeding.   -hold Eliquis and all blood thinners  -continue Metoprolol as prescribed  -telemetry      Malnutrition (HCC)  Assessment & Plan  Albumin 2.6  -prealbumin  -nutrition consult    Folic acid  deficiency  Assessment & Plan  < 2.0  -add folic acid 1mg daily. Likely due to poor nutrition    Hyperlipidemia  Assessment & Plan  Continue home Lipitor     Hypertension  Assessment & Plan  /80 most recently  -restart home Lisinopril/HCTZ and Carvedilol  -CTM    Type 2 diabetes mellitus, with long-term current use of insulin (HCC)  Assessment & Plan  A1c 7.1% December 2020.  -hold Metformin, Alogliptin, Glipizide, and Humulin  -ISS  -hypoglycemic protocol  -accu checks  -diabetic diet      Debility- (present on admission)  Assessment & Plan  Secondary to symptomatic anemia  PT/OT    Urinary retention  Assessment & Plan  During admission to [facility] earlier December 2020., Noted to have urinary retention and Crooks catheter was placed during this hospital stay.  Upon discharge recommendations were made for SNF staff to remove Crooks catheter and attempt voiding trials.  Has been receiving Flomax while in hospital.  Evaluated by physical and patient therapy recommended SNF placement.  -continue crooks and Tamsulosin  -will need outpatient follow up with Urology at the [facility]         VTE prophylaxis: Lovenox

## 2020-12-27 NOTE — PROGRESS NOTES
Pt able to ambulate x1 assist to bathroom, fatigued. Pt had large black tarry stool, sample sent to lab. MD made aware, no new orders.

## 2020-12-28 LAB
ANION GAP SERPL CALC-SCNC: 8 MMOL/L (ref 7–16)
BUN SERPL-MCNC: 14 MG/DL (ref 8–22)
CALCIUM SERPL-MCNC: 7.8 MG/DL (ref 8.5–10.5)
CHLORIDE SERPL-SCNC: 100 MMOL/L (ref 96–112)
CO2 SERPL-SCNC: 25 MMOL/L (ref 20–33)
CREAT SERPL-MCNC: 0.9 MG/DL (ref 0.5–1.4)
ERYTHROCYTE [DISTWIDTH] IN BLOOD BY AUTOMATED COUNT: 68.8 FL (ref 35.9–50)
ERYTHROCYTE [DISTWIDTH] IN BLOOD BY AUTOMATED COUNT: 70.4 FL (ref 35.9–50)
ERYTHROCYTE [DISTWIDTH] IN BLOOD BY AUTOMATED COUNT: 72.2 FL (ref 35.9–50)
GLUCOSE BLD-MCNC: 133 MG/DL (ref 65–99)
GLUCOSE BLD-MCNC: 143 MG/DL (ref 65–99)
GLUCOSE BLD-MCNC: 206 MG/DL (ref 65–99)
GLUCOSE BLD-MCNC: 209 MG/DL (ref 65–99)
GLUCOSE SERPL-MCNC: 153 MG/DL (ref 65–99)
HCT VFR BLD AUTO: 26.2 % (ref 42–52)
HCT VFR BLD AUTO: 28.1 % (ref 42–52)
HCT VFR BLD AUTO: 29.5 % (ref 42–52)
HGB BLD-MCNC: 8.3 G/DL (ref 14–18)
HGB BLD-MCNC: 8.8 G/DL (ref 14–18)
HGB BLD-MCNC: 9.2 G/DL (ref 14–18)
MCH RBC QN AUTO: 30.9 PG (ref 27–33)
MCH RBC QN AUTO: 31.3 PG (ref 27–33)
MCH RBC QN AUTO: 31.9 PG (ref 27–33)
MCHC RBC AUTO-ENTMCNC: 31.2 G/DL (ref 33.7–35.3)
MCHC RBC AUTO-ENTMCNC: 31.3 G/DL (ref 33.7–35.3)
MCHC RBC AUTO-ENTMCNC: 31.7 G/DL (ref 33.7–35.3)
MCV RBC AUTO: 100 FL (ref 81.4–97.8)
MCV RBC AUTO: 102.4 FL (ref 81.4–97.8)
MCV RBC AUTO: 97.4 FL (ref 81.4–97.8)
PLATELET # BLD AUTO: 264 K/UL (ref 164–446)
PLATELET # BLD AUTO: 270 K/UL (ref 164–446)
PLATELET # BLD AUTO: 274 K/UL (ref 164–446)
PMV BLD AUTO: 10 FL (ref 9–12.9)
PMV BLD AUTO: 10.6 FL (ref 9–12.9)
PMV BLD AUTO: 9.9 FL (ref 9–12.9)
POTASSIUM SERPL-SCNC: 3.6 MMOL/L (ref 3.6–5.5)
RBC # BLD AUTO: 2.69 M/UL (ref 4.7–6.1)
RBC # BLD AUTO: 2.81 M/UL (ref 4.7–6.1)
RBC # BLD AUTO: 2.88 M/UL (ref 4.7–6.1)
SODIUM SERPL-SCNC: 133 MMOL/L (ref 135–145)
WBC # BLD AUTO: 5.2 K/UL (ref 4.8–10.8)
WBC # BLD AUTO: 6 K/UL (ref 4.8–10.8)
WBC # BLD AUTO: 6.5 K/UL (ref 4.8–10.8)

## 2020-12-28 PROCEDURE — 700102 HCHG RX REV CODE 250 W/ 637 OVERRIDE(OP): Performed by: GENERAL PRACTICE

## 2020-12-28 PROCEDURE — 36415 COLL VENOUS BLD VENIPUNCTURE: CPT

## 2020-12-28 PROCEDURE — 700102 HCHG RX REV CODE 250 W/ 637 OVERRIDE(OP): Performed by: STUDENT IN AN ORGANIZED HEALTH CARE EDUCATION/TRAINING PROGRAM

## 2020-12-28 PROCEDURE — 99233 SBSQ HOSP IP/OBS HIGH 50: CPT | Performed by: STUDENT IN AN ORGANIZED HEALTH CARE EDUCATION/TRAINING PROGRAM

## 2020-12-28 PROCEDURE — 700105 HCHG RX REV CODE 258: Performed by: STUDENT IN AN ORGANIZED HEALTH CARE EDUCATION/TRAINING PROGRAM

## 2020-12-28 PROCEDURE — 80048 BASIC METABOLIC PNL TOTAL CA: CPT

## 2020-12-28 PROCEDURE — 770020 HCHG ROOM/CARE - TELE (206)

## 2020-12-28 PROCEDURE — A9270 NON-COVERED ITEM OR SERVICE: HCPCS | Performed by: GENERAL PRACTICE

## 2020-12-28 PROCEDURE — A9270 NON-COVERED ITEM OR SERVICE: HCPCS | Performed by: STUDENT IN AN ORGANIZED HEALTH CARE EDUCATION/TRAINING PROGRAM

## 2020-12-28 PROCEDURE — 85027 COMPLETE CBC AUTOMATED: CPT | Mod: 91

## 2020-12-28 PROCEDURE — 82962 GLUCOSE BLOOD TEST: CPT | Mod: 91

## 2020-12-28 RX ADMIN — INSULIN LISPRO 2 UNITS: 100 INJECTION, SOLUTION INTRAVENOUS; SUBCUTANEOUS at 11:00

## 2020-12-28 RX ADMIN — TAMSULOSIN HYDROCHLORIDE 0.4 MG: 0.4 CAPSULE ORAL at 08:51

## 2020-12-28 RX ADMIN — CYANOCOBALAMIN TAB 500 MCG 1000 MCG: 500 TAB at 05:18

## 2020-12-28 RX ADMIN — SODIUM CHLORIDE, POTASSIUM CHLORIDE, SODIUM LACTATE AND CALCIUM CHLORIDE: 600; 310; 30; 20 INJECTION, SOLUTION INTRAVENOUS at 08:00

## 2020-12-28 RX ADMIN — SUCRALFATE 1 G: 1 TABLET ORAL at 15:50

## 2020-12-28 RX ADMIN — SODIUM CHLORIDE, POTASSIUM CHLORIDE, SODIUM LACTATE AND CALCIUM CHLORIDE: 600; 310; 30; 20 INJECTION, SOLUTION INTRAVENOUS at 22:11

## 2020-12-28 RX ADMIN — FOLIC ACID 1 MG: 1 TABLET ORAL at 05:18

## 2020-12-28 RX ADMIN — Medication 1000 UNITS: at 05:18

## 2020-12-28 RX ADMIN — INSULIN LISPRO 2 UNITS: 100 INJECTION, SOLUTION INTRAVENOUS; SUBCUTANEOUS at 22:11

## 2020-12-28 RX ADMIN — OMEPRAZOLE 20 MG: 20 CAPSULE, DELAYED RELEASE ORAL at 05:18

## 2020-12-28 RX ADMIN — SUCRALFATE 1 G: 1 TABLET ORAL at 06:23

## 2020-12-28 RX ADMIN — ATORVASTATIN CALCIUM 40 MG: 40 TABLET, FILM COATED ORAL at 22:28

## 2020-12-28 ASSESSMENT — ENCOUNTER SYMPTOMS
GASTROINTESTINAL NEGATIVE: 1
HEADACHES: 1
PALPITATIONS: 1
RESPIRATORY NEGATIVE: 1
EYES NEGATIVE: 1
PSYCHIATRIC NEGATIVE: 1
DIZZINESS: 1

## 2020-12-28 NOTE — PROGRESS NOTES
[facility] Medicine Daily Progress Note    Date of Service  12/28/2020    Chief Complaint  75 y.o. male admitted 12/24/2020 with Symptomatic Anemia    Hospital Course  75-year-old male with a past medical history of atrial fibrillation on metoprolol, dyslipidemia on atorvastatin, diabetes on Metformin/glipizide and chronic history of slow GI bleed [facility] symptomatic anemia with hemoglobin below 7.  At the emergency department vital signs showing tachycardia and tachypnea.  Patient saturating well room air.  CBC with macrocytic anemia and elevated RDW.  No leukocytosis.  Dementia without chronic abnormalities.  Patient was admitted to telemetry for symptomatic anemia requiring blood transfusion. Patients FOBT came back positive and his Hg continuously dropped after transfusion from 8.6-->8.5-->8.0 transfused 1UPRBC overnight and today is up slightly to 8.2. Did not feel comfortable discharging him as suspicion for UGIB is still strong. Will trend CBC Q6H today and will transfuse if <7. GI will not scope if COVID Positive. Will repeat test in 2 days. If negative, will proceed with EGD. Once Hg stable and no signs of bleeding can be transferred back to the [facility].     Interval Problem Update    12/26-Patient examined at bedside. No overnight complaints. FOBT, Iron Studies and B12/Folate still pending. GI saw patient and stated that patient stopped his PPI therapy after completing H. Pylori therapy. Most likely suffering from recurrent esophagitis. Can be managed with oral PPI BID. [facility] come [facility] and are negative.     12/27-Patients FOBT came back positive and his Hg continuously dropped after transfusion from 8.6-->8.5-->8.0 transfused 1UPRBC overnight and today is up slightly to 8.2. Will trend CBC Q6H today and will transfuse if <7. GI will not scope if COVID Positive. Will repeat test in 2 days. If negative, will proceed with EGD. Once Hg  stable and no signs of bleeding [facility].     12/28-Patient examined at bedside this morning. In no acute distress and has no overnight complaints. Saturating 94% on RA Hg post transfusion 8.6-->8.5-->8.0-->8.2-->8.2-->8.0-->8.7-->8.8. Will need to be stable for 24 hours before discharge. Will continue trending CBC and will transfuse if Hg<7. Retest for COVID 19 tomorrow 12/29. If negative, patient can get EGD same day for assesment of UGIB and possible ligation/cauterization.     Note: Repeat Hg at 3 PM decreased to 8.3. Will continue trending and will transfuse if <7.     Consultants/Specialty  Gastroenterology    Code Status  Full Code    [facility]  [facility]    Review of Systems  Review of Systems   Constitutional: Positive for malaise/fatigue.   HENT: Negative.    Eyes: Negative.    Respiratory: Negative.    Cardiovascular: Positive for palpitations.   Gastrointestinal: Negative.    Genitourinary: Negative.    Skin: Negative.    Neurological: Positive for dizziness and headaches.   Endo/Heme/Allergies: Negative.    Psychiatric/Behavioral: Negative.         Physical Exam  Temp:  [36.1 °C (97 °F)-36.5 °C (97.7 °F)] 36.1 °C (97 °F)  Pulse:  [85-90] 90  Resp:  [16-18] 16  BP: ()/(53-63) 90/63  SpO2:  [94 %-95 %] 94 %    Physical Exam  Vitals signs reviewed.   HENT:      Head: Normocephalic and atraumatic.      Right Ear: External ear normal.      Left Ear: External ear normal.      Nose: Nose normal.      Mouth/Throat:      Mouth: Mucous membranes are moist.   Eyes:      Pupils: Pupils are equal, round, and reactive to light.   Neck:      Musculoskeletal: Neck supple.   Cardiovascular:      Rate and Rhythm: Normal rate and regular rhythm.      Pulses: Normal pulses.      Heart sounds: Normal heart sounds.   Pulmonary:      Effort: Pulmonary effort is normal.      Breath sounds: Normal breath sounds.   Abdominal:      General: Abdomen is flat. Bowel  sounds are normal.   Musculoskeletal: Normal range of motion.   Skin:     General: Skin is warm.      Capillary Refill: Capillary refill takes less than 2 seconds.   Neurological:      General: No focal deficit present.      Mental Status: He is alert.   Psychiatric:         Mood and Affect: Mood normal.         Fluids    Intake/Output Summary (Last 24 hours) at 12/28/2020 1237  Last data filed at 12/28/2020 0500  Gross per 24 hour   Intake --   Output 1600 ml   Net -1600 ml       Laboratory  Recent Labs     12/27/20  1555 12/27/20  2220 12/28/20  0627   WBC 6.0 7.7 6.0   RBC 2.60* 2.76* 2.81*   HEMOGLOBIN 8.0* 8.7* 8.8*   HEMATOCRIT 25.3* 27.2* 28.1*   MCV 97.3 98.6* 100.0*   MCH 30.8 31.5 31.3   MCHC 31.6* 32.0* 31.3*   RDW 67.9* 68.4* 70.4*   PLATELETCT 291 276 270   MPV 10.2 10.0 10.0     Recent Labs     12/26/20  0405 12/27/20  0638 12/28/20  0118   SODIUM 132* 134* 133*   POTASSIUM 4.0 3.7 3.6   CHLORIDE 100 100 100   CO2 25 25 25   GLUCOSE 117* 148* 153*   BUN 18 15 14   CREATININE 0.93 0.82 0.90   CALCIUM 8.3* 7.8* 7.8*                   Imaging  DX-CHEST-PORTABLE (1 VIEW)   Final Result      No evidence of acute cardiopulmonary process.           Assessment/Plan  * Symptomatic anemia  Assessment & Plan  Patient reporting dyspnea on exertion, and orthostatic changes. Guaiac + in the ED. Came from nursing home with HgB 6.5, was 7.9  In the ED, now 7.4   Hg post transfusion 8.6-->8.5-->8.0-->8.2-->8.2-->8.0-->8.7-->8.8  Will need to be stable for 24 hours before discharge. Will continue trending CBC and will transfuse if Hg<7.   Retest for COVID 19 tomorrow 12/29. If negative, patient can get EGD same day for assesment of UGIB and possible ligation/cauterization.   -H/H Q8H   -continue Omeprazole 20mg BID   -continue Carafate 1gm Q6H  -transfuse if HgB <7  -GI consulted, Dr. Noel, had planned to do an EGD and Colonoscopy tomorrow, but per GI, the [facility] or call  GI when he has cleared COVID. Endoscopy is not a consideration until he clears COVID.     COVID-19  Assessment & Plan  COVID Positive by PCR after arriving to the floor  On Isolation  Patient Saturating 95% on RA  Not dyspneic and no respiratory complaints     Paroxysmal atrial fibrillation (HCC)- (present on admission)  Assessment & Plan  Echo 10/27/20:EF 60-65%. Afib: CHADSVASC 4: annual risk of stroke 4.8% (>75, HTN, Dm2).HAS-BLED score 2: moderate risk of bleeding.   -hold Eliquis and all blood thinners  -continue Metoprolol as prescribed  -telemetry      Malnutrition (HCC)  Assessment & Plan  Albumin 2.6  -prealbumin  -nutrition consult    Folic acid deficiency  Assessment & Plan  < 2.0  -add folic acid 1mg daily. Likely due to poor nutrition    Hyperlipidemia  Assessment & Plan  Continue home Lipitor     Hypertension  Assessment & Plan  /80 most recently  -restart home Lisinopril/HCTZ and Carvedilol  -CTM    Type 2 diabetes mellitus, with long-term current use of insulin ([facility])  Assessment & Plan  A1c 7.1% December 2020.  -hold Metformin, Alogliptin, Glipizide, and Humulin  -ISS  -hypoglycemic protocol  -accu checks  -diabetic diet      Debility- (present on admission)  Assessment & Plan  Secondary to symptomatic anemia  PT/OT    Urinary retention  Assessment & Plan  During admission to [facility] earlier December 2020., Noted to have urinary retention and Crooks catheter was placed during this hospital stay.  Upon discharge recommendations were made for SNF staff to remove Crooks catheter and attempt voiding trials.  Has been receiving Flomax while in hospital.  Evaluated by physical and patient therapy recommended SNF placement.  -continue crooks and Tamsulosin  -will need outpatient follow up with Urology at the [facility]         VTE prophylaxis: SCD's

## 2020-12-28 NOTE — CARE PLAN
Problem: Nutritional:  Goal: Achieve adequate nutritional intake  Description: Patient will consume >50% of meals  Outcome: PROGRESSING AS EXPECTED     Per ADL's, PO > 50% x 4 meals documented in the last 2 days. RD will continue to follow for sufficient intake.

## 2020-12-29 ENCOUNTER — PATIENT OUTREACH (OUTPATIENT)
Dept: HEALTH INFORMATION MANAGEMENT | Facility: OTHER | Age: 75
End: 2020-12-29

## 2020-12-29 LAB
ERYTHROCYTE [DISTWIDTH] IN BLOOD BY AUTOMATED COUNT: 68.1 FL (ref 35.9–50)
GLUCOSE BLD-MCNC: 123 MG/DL (ref 65–99)
GLUCOSE BLD-MCNC: 176 MG/DL (ref 65–99)
HCT VFR BLD AUTO: 28.2 % (ref 42–52)
HGB BLD-MCNC: 8.8 G/DL (ref 14–18)
MCH RBC QN AUTO: 30.9 PG (ref 27–33)
MCHC RBC AUTO-ENTMCNC: 31.2 G/DL (ref 33.7–35.3)
MCV RBC AUTO: 98.9 FL (ref 81.4–97.8)
PLATELET # BLD AUTO: 249 K/UL (ref 164–446)
PMV BLD AUTO: 10 FL (ref 9–12.9)
RBC # BLD AUTO: 2.85 M/UL (ref 4.7–6.1)
WBC # BLD AUTO: 6.1 K/UL (ref 4.8–10.8)

## 2020-12-29 PROCEDURE — 770020 HCHG ROOM/CARE - TELE (206)

## 2020-12-29 PROCEDURE — 700102 HCHG RX REV CODE 250 W/ 637 OVERRIDE(OP): Performed by: INTERNAL MEDICINE

## 2020-12-29 PROCEDURE — 700102 HCHG RX REV CODE 250 W/ 637 OVERRIDE(OP): Performed by: GENERAL PRACTICE

## 2020-12-29 PROCEDURE — 99232 SBSQ HOSP IP/OBS MODERATE 35: CPT | Performed by: HOSPITALIST

## 2020-12-29 PROCEDURE — 85027 COMPLETE CBC AUTOMATED: CPT

## 2020-12-29 PROCEDURE — A9270 NON-COVERED ITEM OR SERVICE: HCPCS | Performed by: INTERNAL MEDICINE

## 2020-12-29 PROCEDURE — 700102 HCHG RX REV CODE 250 W/ 637 OVERRIDE(OP): Performed by: STUDENT IN AN ORGANIZED HEALTH CARE EDUCATION/TRAINING PROGRAM

## 2020-12-29 PROCEDURE — A9270 NON-COVERED ITEM OR SERVICE: HCPCS | Performed by: STUDENT IN AN ORGANIZED HEALTH CARE EDUCATION/TRAINING PROGRAM

## 2020-12-29 PROCEDURE — A9270 NON-COVERED ITEM OR SERVICE: HCPCS | Performed by: GENERAL PRACTICE

## 2020-12-29 PROCEDURE — 36415 COLL VENOUS BLD VENIPUNCTURE: CPT

## 2020-12-29 PROCEDURE — 82962 GLUCOSE BLOOD TEST: CPT

## 2020-12-29 PROCEDURE — 97161 PT EVAL LOW COMPLEX 20 MIN: CPT

## 2020-12-29 PROCEDURE — 97165 OT EVAL LOW COMPLEX 30 MIN: CPT

## 2020-12-29 RX ORDER — FOLIC ACID 1 MG/1
1 TABLET ORAL DAILY
Qty: 30 TAB | Refills: 0 | Status: SHIPPED | OUTPATIENT
Start: 2020-12-30 | End: 2020-12-30

## 2020-12-29 RX ADMIN — FOLIC ACID 1 MG: 1 TABLET ORAL at 05:26

## 2020-12-29 RX ADMIN — ATORVASTATIN CALCIUM 40 MG: 40 TABLET, FILM COATED ORAL at 21:37

## 2020-12-29 RX ADMIN — SUCRALFATE 1 G: 1 TABLET ORAL at 13:00

## 2020-12-29 RX ADMIN — METOPROLOL TARTRATE 25 MG: 25 TABLET, FILM COATED ORAL at 05:26

## 2020-12-29 RX ADMIN — TAMSULOSIN HYDROCHLORIDE 0.4 MG: 0.4 CAPSULE ORAL at 09:40

## 2020-12-29 RX ADMIN — Medication 1000 UNITS: at 05:26

## 2020-12-29 RX ADMIN — CYANOCOBALAMIN TAB 500 MCG 1000 MCG: 500 TAB at 05:26

## 2020-12-29 RX ADMIN — INSULIN LISPRO 1 UNITS: 100 INJECTION, SOLUTION INTRAVENOUS; SUBCUTANEOUS at 14:00

## 2020-12-29 RX ADMIN — INSULIN LISPRO 2 UNITS: 100 INJECTION, SOLUTION INTRAVENOUS; SUBCUTANEOUS at 17:00

## 2020-12-29 RX ADMIN — HYDROCHLOROTHIAZIDE 25 MG: 25 TABLET ORAL at 05:26

## 2020-12-29 RX ADMIN — OMEPRAZOLE 20 MG: 20 CAPSULE, DELAYED RELEASE ORAL at 17:00

## 2020-12-29 RX ADMIN — LISINOPRIL 20 MG: 20 TABLET ORAL at 05:25

## 2020-12-29 RX ADMIN — DOCUSATE SODIUM 50 MG AND SENNOSIDES 8.6 MG 2 TABLET: 8.6; 5 TABLET, FILM COATED ORAL at 05:28

## 2020-12-29 RX ADMIN — INSULIN LISPRO 2 UNITS: 100 INJECTION, SOLUTION INTRAVENOUS; SUBCUTANEOUS at 21:37

## 2020-12-29 RX ADMIN — SUCRALFATE 1 G: 1 TABLET ORAL at 18:00

## 2020-12-29 RX ADMIN — SUCRALFATE 1 G: 1 TABLET ORAL at 05:25

## 2020-12-29 RX ADMIN — OMEPRAZOLE 20 MG: 20 CAPSULE, DELAYED RELEASE ORAL at 05:26

## 2020-12-29 ASSESSMENT — COGNITIVE AND FUNCTIONAL STATUS - GENERAL
SUGGESTED CMS G CODE MODIFIER DAILY ACTIVITY: CJ
TOILETING: A LITTLE
DAILY ACTIVITIY SCORE: 22
HELP NEEDED FOR BATHING: A LITTLE
MOBILITY SCORE: 21
SUGGESTED CMS G CODE MODIFIER MOBILITY: CJ
CLIMB 3 TO 5 STEPS WITH RAILING: A LITTLE
WALKING IN HOSPITAL ROOM: A LITTLE
STANDING UP FROM CHAIR USING ARMS: A LITTLE

## 2020-12-29 ASSESSMENT — ENCOUNTER SYMPTOMS
VOMITING: 0
HEADACHES: 0
DIZZINESS: 0
ABDOMINAL PAIN: 0
PALPITATIONS: 0
CHILLS: 0
FEVER: 0
COUGH: 0
NAUSEA: 0
BLOOD IN STOOL: 0
SHORTNESS OF BREATH: 0

## 2020-12-29 ASSESSMENT — ACTIVITIES OF DAILY LIVING (ADL): TOILETING: INDEPENDENT

## 2020-12-29 ASSESSMENT — GAIT ASSESSMENTS
ASSISTIVE DEVICE: FRONT WHEEL WALKER
GAIT LEVEL OF ASSIST: SUPERVISED
DISTANCE (FEET): 50

## 2020-12-29 NOTE — PROGRESS NOTES
Patient A+04. BS within normal parameters today. No coverage needed. Patient ate breakfast. No complaints of pain.  d/c patient IV fluids. Patient worked with PT/OT today. Patient hgb stable, this morning it was 8.8.  states patient needs to continue PPI, and go to outpatient egd/colonscopy when discharged once covid result is negative. D/c order put in, once patient speaks with social work he can get ready to go back to the [facility] facility. Will continue to monitor patient.  Nirmal Dubon RN

## 2020-12-29 NOTE — PROGRESS NOTES
Patient hgb increased today. Patient ate all this meals. No complaints of pain. Once patient gets a negative covid test then he will have scope done. No other acute complaints at this time.   Nirmal Dubon RN

## 2020-12-29 NOTE — DOCUMENTATION QUERY
[facility]                                                                       Query Response Note      PATIENT:               EMANUEL LOCKE  ACCT #:                  [id number]  MRN:                     [id number]  :                      1945  ADMIT DATE:       2020 2:48 PM  DISCH DATE:          RESPONDING  PROVIDER #:        [id number]           QUERY TEXT:     Unspecified blood loss is documented per the ED provider.  Please specify the acuity of the blood loss.     NOTE:  If an appropriate response is not listed below, please respond with a new note.      The patient's Clinical Indicators include:  Per ED provider- I think he is symptomatic from this blood loss anemia.    FOBT positive;   - H&H-  7.9 & 23.5   H&H- 6.9 & 21.5   H&H- 8.8 & 28.1    Treatment- IV PPI;  PO omeprazole;  carafate; GI consult; iron studies; possible EGD/Colonoscopy if COVID negative    Risks - possible recurrent esophagitis; hx of slow GIB  Options provided:   -- Acute blood loss anemia   -- Chronic blood loss   -- other type of anemia, (please document other type of anemia)   -- Unable to determine      Query created by: Lachelle Mahmood on 2020 1:58 PM    RESPONSE TEXT:    Acute blood loss anemia          Electronically signed by:  STEPHANE LEVIN MD 2020 4:10 PM

## 2020-12-29 NOTE — HOSPITAL COURSE
75-year-old male with past medical history of atrial fibrillation (stopped Eliquis due to prior history of GI bleed), chronic history of slow GI bleed, dyslipidemia, diabetes presenting from outside facility due to symptomatic anemia with hemoglobin of 6.5.  He received 2 units of PRBC transfusion during hospitalization and his hemoglobin has remained stable since 12/26.  GI was consulted on admission however endoscopy was deferred due to patient's incidental COVID-19 positive.

## 2020-12-29 NOTE — THERAPY
"Occupational Therapy   Initial Evaluation     Patient Name: Lakhwinder Vega  Age:  75 y.o., Sex:  male  Medical Record #: [id number]  Today's Date: 12/29/2020     Precautions  Precautions: Fall Risk    Assessment  Patient is 75 y.o. male with a past medical history of atrial fibrillation, dyslipidemia, diabetes, and chronic history of slow GI bleed. Pt was transferred from facility for symptomatic anemia. Pt presents to OT eval appearing to be functioning at baseline, as pt states he feels much better today than he did last week. Pt able to complete toilet transfer with spv, LE dressing with spv, and seated grooming/hygiene with spv. Pt used FWW to ambulate in room/bathroom and demonstrated good balance and safety awareness throughout eval. Pt lives alone and has no social support. If pt is discharged home versus post-acute placement, recommend pt be discharged home with shower chair and FWW for safety. Pt reports he is going to take a taxi to get to grocery store as the bus stop is 4 blocks from his home and he was having difficulty at baseline walking to bus stop. Patient will not be actively followed for occupational therapy services at this time, however may be seen if requested by physician for 1 more visit within 30 days to address any discharge or equipment needs. Recommend home health for continued occupational therapy services.     Plan    Recommend Occupational Therapy for Evaluation only.    DC Equipment Recommendations: Front-Wheel Walker, Tub / Shower Seat  Discharge Recommendations: Recommend home health for continued occupational therapy services.      Subjective    Pt alert, pleasant, and cooperative with OT eval. Pt states, \"I'm feeling so much better now.\"     Objective       12/29/20 1446   Prior Living Situation   Prior Services None   Housing / Facility 3 Story Apartment / Condo   Elevator Yes   Bathroom Set up Walk In Shower   Equipment Owned None   Lives with - Patient's Self Care Capacity " Alone and Able to Care For Self   Comments Pt lives alone and reports no social support. Pt uses bus for transport but states he will use taxi if needed.   Prior Level of ADL Function   Self Feeding Independent   Grooming / Hygiene Independent   Bathing Independent   Dressing Independent   Toileting Independent   Prior Level of IADL Function   Medication Management Independent   Laundry Independent   Kitchen Mobility Independent   Finances Independent   Home Management Independent   Shopping Independent   Prior Level Of Mobility Independent Without Device in Community;Independent Without Device in Home   Driving / Transportation Utilizes Taxi Service for Transportation;Utilizes Public Transportation   Occupation (Pre-Hospital Vocational) Retired Due To Age   Leisure Interests Unable To Determine At This Time   History of Falls   History of Falls Yes   Date of Last Fall   (reason for 12/4 admit)   Cognition    Comments pleasant, cooperative, good insight into deficits   Balance Assessment   Comments seated at SBA, standing at SBA with FWW, no overt LOB   Bed Mobility    Supine to Sit Supervised   ADL Assessment   Grooming Supervision;Seated  (oral care)   Lower Body Dressing Supervision  (socks)   Toileting   (declined need, catheter in place)   Functional Mobility   Sit to Stand Supervised   Toilet Transfers Supervised   Mobility in room/bathroom with FWW with SBA   Activity Tolerance   Comments no overt signs/symptoms or complaints of fatigue   Patient / Family Goals   Patient / Family Goal #1 To go home

## 2020-12-30 VITALS
OXYGEN SATURATION: 96 % | HEIGHT: 68 IN | RESPIRATION RATE: 17 BRPM | WEIGHT: 171 LBS | BODY MASS INDEX: 25.91 KG/M2 | DIASTOLIC BLOOD PRESSURE: 58 MMHG | HEART RATE: 98 BPM | TEMPERATURE: 98.8 F | SYSTOLIC BLOOD PRESSURE: 103 MMHG

## 2020-12-30 LAB
ANION GAP SERPL CALC-SCNC: 11 MMOL/L (ref 7–16)
BUN SERPL-MCNC: 11 MG/DL (ref 8–22)
CALCIUM SERPL-MCNC: 8 MG/DL (ref 8.5–10.5)
CHLORIDE SERPL-SCNC: 98 MMOL/L (ref 96–112)
CO2 SERPL-SCNC: 23 MMOL/L (ref 20–33)
CREAT SERPL-MCNC: 0.73 MG/DL (ref 0.5–1.4)
ERYTHROCYTE [DISTWIDTH] IN BLOOD BY AUTOMATED COUNT: 66.1 FL (ref 35.9–50)
GLUCOSE BLD-MCNC: 120 MG/DL (ref 65–99)
GLUCOSE BLD-MCNC: 170 MG/DL (ref 65–99)
GLUCOSE BLD-MCNC: 238 MG/DL (ref 65–99)
GLUCOSE BLD-MCNC: 246 MG/DL (ref 65–99)
GLUCOSE BLD-MCNC: 304 MG/DL (ref 65–99)
GLUCOSE SERPL-MCNC: 197 MG/DL (ref 65–99)
HCT VFR BLD AUTO: 32.2 % (ref 42–52)
HGB BLD-MCNC: 9.9 G/DL (ref 14–18)
MCH RBC QN AUTO: 30.4 PG (ref 27–33)
MCHC RBC AUTO-ENTMCNC: 30.7 G/DL (ref 33.7–35.3)
MCV RBC AUTO: 98.8 FL (ref 81.4–97.8)
PLATELET # BLD AUTO: 206 K/UL (ref 164–446)
PMV BLD AUTO: 11.4 FL (ref 9–12.9)
POTASSIUM SERPL-SCNC: 3.7 MMOL/L (ref 3.6–5.5)
RBC # BLD AUTO: 3.26 M/UL (ref 4.7–6.1)
SODIUM SERPL-SCNC: 132 MMOL/L (ref 135–145)
WBC # BLD AUTO: 5.6 K/UL (ref 4.8–10.8)

## 2020-12-30 PROCEDURE — A9270 NON-COVERED ITEM OR SERVICE: HCPCS | Performed by: GENERAL PRACTICE

## 2020-12-30 PROCEDURE — A9270 NON-COVERED ITEM OR SERVICE: HCPCS | Performed by: INTERNAL MEDICINE

## 2020-12-30 PROCEDURE — 99239 HOSP IP/OBS DSCHRG MGMT >30: CPT | Performed by: STUDENT IN AN ORGANIZED HEALTH CARE EDUCATION/TRAINING PROGRAM

## 2020-12-30 PROCEDURE — 36415 COLL VENOUS BLD VENIPUNCTURE: CPT

## 2020-12-30 PROCEDURE — A9270 NON-COVERED ITEM OR SERVICE: HCPCS | Performed by: STUDENT IN AN ORGANIZED HEALTH CARE EDUCATION/TRAINING PROGRAM

## 2020-12-30 PROCEDURE — 700102 HCHG RX REV CODE 250 W/ 637 OVERRIDE(OP): Performed by: STUDENT IN AN ORGANIZED HEALTH CARE EDUCATION/TRAINING PROGRAM

## 2020-12-30 PROCEDURE — 85027 COMPLETE CBC AUTOMATED: CPT

## 2020-12-30 PROCEDURE — 700102 HCHG RX REV CODE 250 W/ 637 OVERRIDE(OP): Performed by: GENERAL PRACTICE

## 2020-12-30 PROCEDURE — 700102 HCHG RX REV CODE 250 W/ 637 OVERRIDE(OP): Performed by: INTERNAL MEDICINE

## 2020-12-30 PROCEDURE — 80048 BASIC METABOLIC PNL TOTAL CA: CPT

## 2020-12-30 PROCEDURE — 82962 GLUCOSE BLOOD TEST: CPT

## 2020-12-30 RX ORDER — LISINOPRIL 20 MG/1
20 TABLET ORAL DAILY
Qty: 30 TAB | Status: ON HOLD
Start: 2020-12-30 | End: 2021-01-04 | Stop reason: SDUPTHER

## 2020-12-30 RX ORDER — FOLIC ACID 1 MG/1
1 TABLET ORAL DAILY
Qty: 30 TAB | Refills: 0 | Status: ON HOLD
Start: 2020-12-30 | End: 2021-01-04 | Stop reason: SDUPTHER

## 2020-12-30 RX ADMIN — SUCRALFATE 1 G: 1 TABLET ORAL at 12:00

## 2020-12-30 RX ADMIN — METOPROLOL TARTRATE 25 MG: 25 TABLET, FILM COATED ORAL at 17:53

## 2020-12-30 RX ADMIN — DOCUSATE SODIUM 50 MG AND SENNOSIDES 8.6 MG 2 TABLET: 8.6; 5 TABLET, FILM COATED ORAL at 06:21

## 2020-12-30 RX ADMIN — DOCUSATE SODIUM 50 MG AND SENNOSIDES 8.6 MG 2 TABLET: 8.6; 5 TABLET, FILM COATED ORAL at 17:53

## 2020-12-30 RX ADMIN — SUCRALFATE 1 G: 1 TABLET ORAL at 17:53

## 2020-12-30 RX ADMIN — INSULIN LISPRO 1 UNITS: 100 INJECTION, SOLUTION INTRAVENOUS; SUBCUTANEOUS at 17:57

## 2020-12-30 RX ADMIN — SUCRALFATE 1 G: 1 TABLET ORAL at 01:00

## 2020-12-30 RX ADMIN — LISINOPRIL 20 MG: 20 TABLET ORAL at 06:21

## 2020-12-30 RX ADMIN — SUCRALFATE 1 G: 1 TABLET ORAL at 06:22

## 2020-12-30 RX ADMIN — TAMSULOSIN HYDROCHLORIDE 0.4 MG: 0.4 CAPSULE ORAL at 08:39

## 2020-12-30 RX ADMIN — METOPROLOL TARTRATE 25 MG: 25 TABLET, FILM COATED ORAL at 06:22

## 2020-12-30 RX ADMIN — HYDROCHLOROTHIAZIDE 25 MG: 25 TABLET ORAL at 06:22

## 2020-12-30 RX ADMIN — CYANOCOBALAMIN TAB 500 MCG 1000 MCG: 500 TAB at 06:21

## 2020-12-30 RX ADMIN — FOLIC ACID 1 MG: 1 TABLET ORAL at 06:22

## 2020-12-30 RX ADMIN — SUCRALFATE 1 G: 1 TABLET ORAL at 12:02

## 2020-12-30 RX ADMIN — INSULIN LISPRO 2 UNITS: 100 INJECTION, SOLUTION INTRAVENOUS; SUBCUTANEOUS at 11:00

## 2020-12-30 RX ADMIN — OMEPRAZOLE 20 MG: 20 CAPSULE, DELAYED RELEASE ORAL at 17:53

## 2020-12-30 RX ADMIN — DOCUSATE SODIUM 50 MG AND SENNOSIDES 8.6 MG 2 TABLET: 8.6; 5 TABLET, FILM COATED ORAL at 17:54

## 2020-12-30 RX ADMIN — OMEPRAZOLE 20 MG: 20 CAPSULE, DELAYED RELEASE ORAL at 06:21

## 2020-12-30 RX ADMIN — Medication 1000 UNITS: at 06:21

## 2020-12-30 NOTE — DISCHARGE PLANNING
Anticipated Discharge Disposition:   [facility]    Action:    PASRR:  [id number]    Pt admitted from [facility] SNF for symptomatic anemia, Covid-19, parox afib, malnutrition, folic acid deficiency, urinary retention, type 2 DM.    Pt medically clear per Dr. Rueda.  Pt agreeable to return to [facility].  Choice form faxed to [facility].  Transport form faxed to [facility].  T to  patient today between 1830 and 1900.    Barriers to Discharge:    None    Plan:    [place]    Care Transition Team Assessment    Information Source  Orientation : Oriented x 4  Information Given By: Patient  Who is responsible for making decisions for patient? : Patient    Readmission Evaluation  Is this a readmission?: Yes - unplanned readmission    Elopement Risk  Legal Hold: No  Ambulatory or Self Mobile in Wheelchair: No-Not an Elopement Risk    Interdisciplinary Discharge Planning  Lives with - Patient's Self Care Capacity: Alone and Able to Care For Self  Housing / Facility: 2 Story Apartment / Condo  Prior Services: None    Discharge Preparedness  What is your plan after discharge?: Skilled nursing facility  Prior Functional Level: Ambulatory, Needs Assist with Activities of Daily Living, Needs Assist with Medication Management  Difficulity with ADLs: Bathing, Dressing, Walking  Difficulity with IADLs: Cooking, Driving, Laundry, Managing medication, Shopping    Functional Assesment  Prior Functional Level: Ambulatory, Needs Assist with Activities of Daily Living, Needs Assist with Medication Management    Finances  Financial Barriers to Discharge: No  Prescription Coverage: Yes    Vision / Hearing Impairment  Right Eye Vision: Impaired  Left Eye Vision: Impaired  Hearing Impairment: Both Ears         Advance Directive  Advance Directive?: None    Domestic Abuse  Have you ever been the victim of abuse or violence?: No         Discharge Risks or Barriers  Discharge risks or barriers?: Lives alone, no community  support  Patient risk factors: Lives alone and no community support    Anticipated Discharge Information  Discharge Disposition: D/T to SNF with Medicare cert in anticipation of skilled care (03)  Discharge Address: [address] [place] [id number]  Discharge Contact Phone Number: [phone]

## 2020-12-30 NOTE — DISCHARGE PLANNING
Received Transport Form @ 1500  Spoke to May @ GMT    Transport is scheduled for 12/30 @3037-3926 going to [facility].  Trip # [id number]    Melanie at [facility] notified.

## 2020-12-30 NOTE — DISCHARGE PLANNING
Agency/Facility Name: [facility]  Outcome: Left vmail for admissions inquiring if pt will be able to return to facility on 12/4.    1440  Karon from [facility] notified that pt is now m/c. Requested for [facility] to send SNF referral.    1455  Per Karon, pt accepted. Requested for [facility] to set up transport around 1800 tonight.

## 2020-12-30 NOTE — DISCHARGE SUMMARY
Discharge Summary    CHIEF COMPLAINT ON ADMISSION  Chief Complaint   Patient presents with   • Abnormal Labs     hemoglobin 6.5, from facility. Recently seen here for GI bleed. Hx lolita-erika, took eliquis for afib, stopped recently due to GI bleed       Reason for Admission  Symptomatic Anemia    CODE STATUS  Full Code    HPI & HOSPITAL COURSE  75-year-old male with past medical history of atrial fibrillation (stopped Eliquis due to prior history of GI bleed), chronic history of slow GI bleed, dyslipidemia, diabetes presenting from outside facility due to symptomatic anemia with hemoglobin of 6.5.  He received 2 units of PRBC transfusion during hospitalization and his hemoglobin has remained stable since 12/26.  GI was consulted on admission however endoscopy was deferred due to patient's incidental COVID-19 positive.  He had no signs nor symptoms of pulmonary COVID19 and did not warrant dexamethasone nor remdesivir. A crooks catheter was placed due to urinary retention, which can be removed at next LOC or deferred to urology follow up.    Therefore, he is discharged in fair and stable condition to skilled nursing facility.    The patient met 2-midnight criteria for an inpatient stay at the time of discharge.      FOLLOW UP ITEMS POST DISCHARGE  1. Chronic Blood Loss Anemia - no GIB during admission. Outpatient endoscopy with EGD and Colonoscopy indicated  2. Atrial fibrillation - resume DOAC at follow-up after discharge if hemoglobin stable and no signs of bleeding .  3. Urinary retention - ongoing tamsulosin therapy and attempt to remove crooks at SNF. If recurrent urinary retention, a referral to urology for outpatient removal is indicated.    DISCHARGE DIAGNOSES  Principal Problem:    Symptomatic anemia POA: Yes  Active Problems:    Paroxysmal atrial fibrillation (HCC) POA: Yes    COVID-19 POA: Yes    Debility POA: Yes    Type 2 diabetes mellitus, with long-term current use of insulin (HCC) POA: Yes    Hypertension  POA: Yes    Hyperlipidemia POA: Yes    Folic acid deficiency POA: Yes    Malnutrition (HCC) POA: Yes    Urinary retention POA: Yes  Resolved Problems:    BPH (benign prostatic hyperplasia) POA: Unknown      FOLLOW UP  No future appointments.  [facility]  [address] [address]  [phone]  Schedule an appointment as soon as possible for a visit in 1 week  with your primary care provider.       MEDICATIONS ON DISCHARGE     Medication List      START taking these medications      Instructions   folic acid 1 MG Tabs  Commonly known as: FOLVITE   Take 1 Tab by mouth every day.  Dose: 1 mg     lisinopril 20 MG Tabs  Commonly known as: PRINIVIL   Take 1 Tab by mouth every day.  Dose: 20 mg        CONTINUE taking these medications      Instructions   Alogliptin Benzoate 12.5 MG Tabs   Take 12.5 mg by mouth every day.  Dose: 12.5 mg     atorvastatin 40 MG Tabs  Commonly known as: LIPITOR   Take 40 mg by mouth every evening.  Dose: 40 mg     glipiZIDE 5 MG Tabs  Commonly known as: GLUCOTROL   Take 5 mg by mouth 2 times a day.  Dose: 5 mg     glucose 4 g chewable tablet   Chew 15 g as needed for Low Blood Sugar.  Dose: 15 g     insulin regular 100 Unit/mL Soln  Commonly known as: HumuLIN R   Inject 1-6 Units under the skin 4 Times a Day,Before Meals and at Bedtime.  Dose: 1-6 Units     metFORMIN 500 MG Tabs  Commonly known as: GLUCOPHAGE   Take 500 mg by mouth 2 times a day, with meals.  Dose: 500 mg     metoprolol 25 MG Tabs  Commonly known as: LOPRESSOR   Take 1 Tab by mouth 2 Times a Day.  Dose: 25 mg     mirtazapine 15 MG Tabs  Commonly known as: Remeron   Take 1 Tab by mouth every bedtime.  Dose: 15 mg     pantoprazole 40 MG Tbec  Commonly known as: PROTONIX   Take 40 mg by mouth 2 times a day.  Dose: 40 mg     sucralfate 1 GM Tabs  Commonly known as: CARAFATE   Take 1 Tab by mouth every 6 hours.  Dose: 1 g     tamsulosin 0.4 MG capsule  Commonly known as: FLOMAX   Take 1 Cap by mouth  1/2 hour after breakfast.  Dose: 0.4 mg     Vitamin B12 1000 MCG Tbcr   Take 1,000 mcg by mouth every day.  Dose: 1,000 mcg     vitamin D 1000 Unit (25 mcg) Tabs  Commonly known as: cholecalciferol   Take 1,000 Units by mouth every day.  Dose: 1,000 Units        STOP taking these medications    apixaban 5mg Tabs  Commonly known as: ELIQUIS     aspirin 81 MG EC tablet     carvedilol 25 MG Tabs  Commonly known as: COREG     lisinopril-hydrochlorothiazide 20-25 MG per tablet  Commonly known as: PRINZIDE     metroNIDAZOLE 500 MG Tabs  Commonly known as: FLAGYL     tetracycline 250 MG Caps  Commonly known as: SUMYCIN            Allergies  Allergies   Allergen Reactions   • Pcn [Penicillins] Anaphylaxis and Hives     Hives on back of neck       DIET  Orders Placed This Encounter   Procedures   • Diet Order Diet: Cardiac     Standing Status:   Standing     Number of Occurrences:   1     Order Specific Question:   Diet:     Answer:   Cardiac [6]       ACTIVITY  As tolerated.  Weight bearing as tolerated    LINES, DRAINS, AND WOUNDS  This is an automated list. Peripheral IVs will be removed prior to discharge.  Peripheral IV 12/26/20 20 G Anterior;Left Forearm (Active)   Site Assessment Clean;Intact;Dry 12/30/20 1401   Dressing Type Transparent 12/30/20 1401   Line Status Saline locked;Flushed 12/30/20 1401   Dressing Status Clean;Dry;Intact 12/30/20 1401   Dressing Intervention N/A 12/30/20 1401   Infiltration Grading ([facility], [facility]) 0 12/29/20 1222   Phlebitis Scale ([facility]) 0 12/29/20 1222     Urethral Catheter (Active)   Site Assessment Clean;Skin intact 12/30/20 1402   Collection Container Standard drainage bag 12/30/20 1402   Urinary Catheter Care Tamper Evident Seal in Place;Drainage Tube Extended;Drainage Bag Not Overfilled;Drainage Tubing Properly Secured;Drainage Bag Below Bladder Level and Not on Floor;Cath Care Done with Soap & Water 12/30/20 1402   Securement Method Securing device (Describe) 12/30/20 1402    Output (mL) 1200 mL 12/30/20 0400         Peripheral IV 12/26/20 20 G Anterior;Left Forearm (Active)   Site Assessment Clean;Intact;Dry 12/30/20 1401   Dressing Type Transparent 12/30/20 1401   Line Status Saline locked;Flushed 12/30/20 1401   Dressing Status Clean;Dry;Intact 12/30/20 1401   Dressing Intervention N/A 12/30/20 1401   Infiltration Grading ([facility] 0 12/29/20 1222   Phlebitis Scale ([facility] Only) 0 12/29/20 1222           Urethral Catheter (Active)   Site Assessment Clean;Skin intact 12/30/20 1402   Collection Container Standard drainage bag 12/30/20 1402   Urinary Catheter Care Tamper Evident Seal in Place;Drainage Tube Extended;Drainage Bag Not Overfilled;Drainage Tubing Properly Secured;Drainage Bag Below Bladder Level and Not on Floor;Cath Care Done with Soap & Water 12/30/20 1402   Securement Method Securing device (Describe) 12/30/20 1402   Output (mL) 1200 mL 12/30/20 0400        MENTAL STATUS ON TRANSFER  Level of Consciousness: Alert  Orientation : Oriented x 4  Speech: Speech Clear    CONSULTATIONS  Gastroenterology    PROCEDURES  None    LABORATORY  Lab Results   Component Value Date    SODIUM 132 (L) 12/30/2020    POTASSIUM 3.7 12/30/2020    CHLORIDE 98 12/30/2020    CO2 23 12/30/2020    GLUCOSE 197 (H) 12/30/2020    BUN 11 12/30/2020    CREATININE 0.73 12/30/2020        Lab Results   Component Value Date    WBC 5.6 12/30/2020    HEMOGLOBIN 9.9 (L) 12/30/2020    HEMATOCRIT 32.2 (L) 12/30/2020    PLATELETCT 206 12/30/2020        Total time of the discharge process exceeds 35 minutes.

## 2020-12-30 NOTE — THERAPY
"Physical Therapy   Initial Evaluation     Patient Name: Lakhwinder Vega  Age:  75 y.o., Sex:  male  Medical Record #: [id number]  Today's Date: 12/29/2020     Precautions: Fall Risk    Assessment  Patient is 75 y.o. male that presented to acute 12/24 with symptomatic anemia. PMHx significant for Afib, DLD, DM, chronic slow GIB. He appears to be near baseline functional mobility, reported activity tolerance much improved as compared to a few days prior. He ambulated approximately 50ft with FWW and supervision. He demonstrated good insight into deficits and verbalized strategies for performing mobility at home safely. Patient will not be actively followed for physical therapy services at this time, however may be seen if requested by physician for 1 more visit within 30 days to address any discharge or equipment needs.    Plan    Recommend Physical Therapy for Evaluation only    DC Equipment Recommendations: Front-Wheel Walker  Discharge Recommendations: Recommend home health for continued physical therapy services       Subjective    \"I think it will be smart for me to use a walker for a little bit when I get home.\"     Objective       12/29/20 1601   Total Time Spent   Total Time Spent (Mins) 21   Charge Group   PT Evaluation PT Evaluation Low   Initial Contact Note    Initial Contact Note Order Received and Verified, Physical Therapy Evaluation in Progress with Full Report to Follow.   Precautions   Precautions Fall Risk   Vitals   O2 (LPM) 0   O2 Delivery Device None - Room Air   Pain 0 - 10 Group   Therapist Pain Assessment   (no pain during session)   Prior Living Situation   Prior Services None   Housing / Facility 2 Story Apartment / Condo   Steps Into Home   (flight, has elevator)   Steps In Home 0   Elevator Yes   Bathroom Set up Walk In Shower   Equipment Owned None   Lives with - Patient's Self Care Capacity Alone and Able to Care For Self   Comments Patient reported he could ask for help from neighbors but " unknown if they can assist   Prior Level of Functional Mobility   Bed Mobility Independent   Transfer Status Independent   Ambulation Independent   Distance Ambulation (Feet)   (community)   Assistive Devices Used None   Stairs Other (Comments)  (avoids)   Comments Patient reported walk to bus stop has been difficult lately   History of Falls   History of Falls Yes   Cognition    Cognition / Consciousness WDL   Level of Consciousness Alert   Comments pleasant, cooperative, good insight into deficits   Passive ROM Lower Body   Passive ROM Lower Body WDL   Comments not formally tested, WFL for mobiltiy   Active ROM Lower Body    Active ROM Lower Body  WDL   Comments as above   Strength Lower Body   Lower Body Strength  WDL   Comments as above   Sensation Lower Body   Lower Extremity Sensation   Not Tested   Lower Body Muscle Tone   Lower Body Muscle Tone  WDL   Neurological Concerns   Neurological Concerns No   Coordination Lower Body    Coordination Lower Body  WDL   Balance Assessment   Sitting Balance (Static) Good   Sitting Balance (Dynamic) Good   Standing Balance (Static) Fair +   Standing Balance (Dynamic) Fair   Weight Shift Sitting Good   Weight Shift Standing Good   Comments supervision sitting EOB SBA in standing with FWW   Gait Analysis   Gait Level Of Assist Supervised   Assistive Device Front Wheel Walker   Distance (Feet) 50   # of Times Distance was Traveled 1   Deviation   (decreased eulogio)   # of Stairs Climbed 0   Weight Bearing Status no restrictions   Vision Deficits Impacting Mobility NT   Comments cues for walker management   Bed Mobility    Supine to Sit Supervised   Sit to Supine   (NT, left in chair)   Scooting Supervised  (seated)   Functional Mobility   Sit to Stand Supervised   Bed, Chair, Wheelchair Transfer Supervised   Transfer Method Stand Step   How much difficulty does the patient currently have...   Turning over in bed (including adjusting bedclothes, sheets and blankets)? 4    Sitting down on and standing up from a chair with arms (e.g., wheelchair, bedside commode, etc.) 4   Moving from lying on back to sitting on the side of the bed? 4   How much help from another person does the patient currently need...   Moving to and from a bed to a chair (including a wheelchair)? 3   Need to walk in a hospital room? 3   Climbing 3-5 steps with a railing? 3   6 clicks Mobility Score 21   Activity Tolerance   Sitting in Chair left in chair   Sitting Edge of Bed 5 min   Standing 10 min   Comments no pain, fatigue, SOB, dizziness   Edema / Skin Assessment   Edema / Skin  Not Assessed   Education Group   Education Provided Role of Physical Therapist   Role of Physical Therapist Patient Response Patient;Acceptance;Explanation;Verbal Demonstration   Anticipated Discharge Equipment and Recommendations   DC Equipment Recommendations Front-Wheel Walker   Discharge Recommendations Recommend home health for continued physical therapy services   Interdisciplinary Plan of Care Collaboration   IDT Collaboration with  Nursing   Patient Position at End of Therapy Seated;Call Light within Reach;Tray Table within Reach;Phone within Reach   Collaboration Comments RN aware of visit, response, DC recs   Session Information   Date / Session Number  12/29 - DC needs only   Priority 0

## 2020-12-30 NOTE — PROGRESS NOTES
Bedside report received.  Assessment complete.  A&O x 4. Patient calls appropriately.  Patient with stand-by assist. FWW used, unsteady gait.  Bed alarm in place.   Patient has denies pain.  Denies N&V. Tolerating diabetic diet.  Patient denies SOB.  SCD's refused.  Review plan with of care with patient. Call light and personal belongings with in reach. Hourly rounding in place. All needs met at this time.

## 2020-12-31 NOTE — DOCUMENTATION QUERY
[facility]                                                                       Query Response Note      PATIENT:               EMANUEL LOCKE  ACCT #:                  [id number]  MRN:                     [id number]  :                      1945  ADMIT DATE:       2020 2:48 PM  DISCH DATE:        2020 8:59 PM  RESPONDING  PROVIDER #:        [id number]           QUERY TEXT:    There is conflicting documentation in the medical record     Acute blood loss anemia is answered per the prior query response.  Chronic blood loss anemia is documented per the DC summary    Based on treatment, clinical findings and risk factors, can this documentation be further clarified?     NOTE:  If an appropriate response is not listed below, please respond with a new note.      The patient's Clinical Indicators include:  Per ED provider- I think he is symptomatic from this blood loss anemia; he does have evidence of ongoing GI bleed on rectal exam.  FOBT positive;   - H&H-  7.9 & 23.5   H&H- 6.9 & 21.5   H&H- 8.8 & 28.1    Per DC summary- Chronic blood loss anemia - no GIB during admission    Treatment - IV PPI;  PO omeprazole;  carafate; GI consult; iron studies; serial labs; possible EGD/Colonoscopy if COVID negative    Risks - Risks - possible recurrent esophagitis; hx of slow GIB    Thank You,  Lachelle Mahmood RN, CCDS, CDIP, CCS  Clinical  Lead  Connect via [employer]  Options provided:   -- Acute blood loss anemia   -- Acute on chronic blood loss anemia   -- Chronic blood loss anemia   -- Unable to determine      Query created by: Lachelle Mahmood on 2020 6:34 AM    RESPONSE TEXT:    Acute on chronic blood loss anemia          Electronically signed by:  JOAN ADAME MD 2020 9:32 AM

## 2020-12-31 NOTE — DISCHARGE INSTRUCTIONS
Discharge Instructions    Discharged to other by medical transportation with escort. Discharged via wheelchair, hospital escort: Yes.  Special equipment needed: Walker    Be sure to schedule a follow-up appointment with your primary care doctor or any specialists as instructed.     Discharge Plan:   Diet Plan: Discussed  Activity Level: Discussed  Confirmed Follow up Appointment: Patient to Call and Schedule Appointment  Confirmed Symptoms Management: Discussed  Medication Reconciliation Updated: Yes  Influenza Vaccine Indication: Patient Refuses    I understand that a diet low in cholesterol, fat, and sodium is recommended for good health. Unless I have been given specific instructions below for another diet, I accept this instruction as my diet prescription.   Other diet: cardiac diet    Special Instructions: NoneINSTRUCTIONS FOR COVID-19 OR ANY OTHER INFECTIOUS RESPIRATORY ILLNESSES    The Centers for Disease Control and Prevention (CDC) states that early indications for COVID-19 include cough, shortness of breath, difficulty breathing, or at least two of the following symptoms: chills, shaking with chills, muscle pain, headache, sore throat, and loss of taste or smell. Symptoms can range from mild to severe and may appear up to two weeks after exposure to the virus.    The practice of self-isolation and quarantine helps protect the public and your family by  preventing exposure to people who have or may have a contagious disease. Please follow the prevention steps below as based on CDC guidelines:    WHEN TO STOP ISOLATION: Persons with COVID-19 or any other infectious respiratory illness who have symptoms and were advised to care for themselves at home may discontinue home isolation under the following conditions:  · At least 24 hours have passed since recovery defined as resolution of fever without the use of fever-reducing medications; AND,  · Improvement in respiratory symptoms (e.g., cough, shortness of  breath); AND,  · At least 10 days have passed since symptoms first appeared and have had no subsequent illness.    MONITOR YOUR SYMPTOMS: If your illness is worsening, seek prompt medical attention. If you have a medical emergency and need to call 911, notify the dispatch personnel that you have, or are being evaluated for confirmed or suspected COVID-19 or another infectious respiratory illness. Wear a facemask if possible.    ACTIVITY RESTRICTION: restrict activities outside your home, except for getting medical care. Do not go to work, school, or public areas. Avoid using public transportation, ride-sharing, or taxis.    SCHEDULED MEDICAL APPOINTMENTS: Notify your provider that you have, or are being evaluated for, confirmed or suspected COVID-19 or another infectious respiratory. This will help the healthcare provider’s office safely take care of you and keep other people from getting exposed or infected.    FACEMASKS, when to wear: Anytime you are away from your home or around other people or pets. If you are unable to wear one, maintain a minimum of 6 feet distancing from others.    LIVING ENVIRONMENT: Stay in a separate room from other people and pets. If possible, use a separate bathroom, have someone else care for your pets and avoid sharing household items. Any items used should be washed thoroughly with soap and water. Clean all “high-touch” surfaces every day. Use a household cleaning spray or wipe, according to the label instructions. High touch surfaces include (but are not limited to) counters, tabletops, doorknobs, bathroom fixtures, toilets, phones, keyboards, tablets, and bedside tables.     HAND WASHING: Frequently wash hands with soap and water for at least 20 seconds,  especially after blowing your nose, coughing, or sneezing; going to the bathroom; before and after interacting with pets; and before and after eating or preparing food. If hands are visibly dirty use soap and water. If soap and  water are not available, use an alcohol-based hand  with at least 60% alcohol. Avoid touching your eyes, nose, and mouth with unwashed hands. Cover your coughs and sneezes with a tissue. Throw used tissues in a lined trash can. Immediately wash your hands.    ACTIVE/FACILITATED SELF-MONITORING: Follow instructions provided by your local health department or health professionals, as appropriate. When working with your local health department check their available hours.    [facility]   Phone Number   [place] [phone]   [place], [place] [phone]   [place] Call 211   [place] [phone]     IF YOU HAVE CONFIRMED POSITIVE COVID-19:    Those who have completely recovered from COVID-19 may have immune-boosting antibodies in their plasma--called “convalescent plasma”--that could be used to treat critically ill COVID19 patients.    [facility] is excited to begin working with [facility] on collecting convalescent plasma from  people who have recovered from COVID-19 as part of a program to treat patients infected with the virus. This FDA-approved “emergency investigational new drug” is a special blood product containing antibodies that may give patients an extra boost to fight the virus.    To be eligible to donate convalescent plasma, you must have a prior COVID-19 diagnosis documented by a laboratory test (or a positive test result for SARS-CoV-2 antibodies) and meet additional eligibility requirements.    If you are interested in donating convalescent plasma or have any additional questions, please contact the [facility] Convalescent Plasma  at [phone] or via e-mail at [email or web address].    · Is patient discharged on Warfarin / Coumadin?   No       Depression / Suicide Risk    As you are discharged from this [facility], it is important to learn how to keep safe from harming yourself.    Recognize the warning signs:  · Abrupt changes in  personality, positive or negative- including increase in energy   · Giving away possessions  · Change in eating patterns- significant weight changes-  positive or negative  · Change in sleeping patterns- unable to sleep or sleeping all the time   · Unwillingness or inability to communicate  · Depression  · Unusual sadness, discouragement and loneliness  · Talk of wanting to die  · Neglect of personal appearance   · Rebelliousness- reckless behavior  · Withdrawal from people/activities they love  · Confusion- inability to concentrate     If you or a loved one observes any of these behaviors or has concerns about self-harm, here's what you can do:  · Talk about it- your feelings and reasons for harming yourself  · Remove any means that you might use to hurt yourself (examples: pills, rope, extension cords, firearm)  · Get professional help from the community (Mental Health, Substance Abuse, psychological counseling)  · Do not be alone:Call your Safe Contact- someone whom you trust who will be there for you.  · Call your local CRISIS HOTLINE 503-0176 or 565-635-3698  · Call your local Children's Mobile Crisis Response Team [place] (210) 824-5643 or www.[employer]  · Call the toll free National Suicide Prevention Hotlines   · National Suicide Prevention Lifeline 780-115-QPJN (1026)  · National Hope Line Network 800-SUICIDE (807-7832)

## 2021-01-02 ENCOUNTER — APPOINTMENT (OUTPATIENT)
Dept: RADIOLOGY | Facility: MEDICAL CENTER | Age: 76
DRG: 640 | End: 2021-01-02
Attending: EMERGENCY MEDICINE
Payer: COMMERCIAL

## 2021-01-02 ENCOUNTER — HOSPITAL ENCOUNTER (INPATIENT)
Facility: MEDICAL CENTER | Age: 76
LOS: 2 days | DRG: 640 | End: 2021-01-04
Attending: EMERGENCY MEDICINE | Admitting: INTERNAL MEDICINE
Payer: COMMERCIAL

## 2021-01-02 DIAGNOSIS — E86.0 DEHYDRATION: ICD-10-CM

## 2021-01-02 DIAGNOSIS — R53.81 DEBILITY: ICD-10-CM

## 2021-01-02 DIAGNOSIS — R79.89 ELEVATED TROPONIN: ICD-10-CM

## 2021-01-02 DIAGNOSIS — U07.1 COVID-19: ICD-10-CM

## 2021-01-02 DIAGNOSIS — R06.00 ACUTE DYSPNEA: ICD-10-CM

## 2021-01-02 DIAGNOSIS — N17.9 ACUTE KIDNEY INJURY (NONTRAUMATIC) (HCC): ICD-10-CM

## 2021-01-02 DIAGNOSIS — R00.0 SINUS TACHYCARDIA BY ELECTROCARDIOGRAM: ICD-10-CM

## 2021-01-02 PROBLEM — E87.20 LACTIC ACIDOSIS: Status: ACTIVE | Noted: 2021-01-02

## 2021-01-02 LAB
ABO GROUP BLD: NORMAL
ALBUMIN SERPL BCP-MCNC: 2.7 G/DL (ref 3.2–4.9)
ALBUMIN SERPL BCP-MCNC: 3.1 G/DL (ref 3.2–4.9)
ALBUMIN/GLOB SERPL: 0.9 G/DL
ALBUMIN/GLOB SERPL: 1 G/DL
ALP SERPL-CCNC: 67 U/L (ref 30–99)
ALP SERPL-CCNC: 76 U/L (ref 30–99)
ALT SERPL-CCNC: 29 U/L (ref 2–50)
ALT SERPL-CCNC: 38 U/L (ref 2–50)
ANION GAP SERPL CALC-SCNC: 15 MMOL/L (ref 7–16)
ANION GAP SERPL CALC-SCNC: 19 MMOL/L (ref 7–16)
ANISOCYTOSIS BLD QL SMEAR: ABNORMAL
APPEARANCE UR: CLEAR
APTT PPP: 30.7 SEC (ref 24.7–36)
AST SERPL-CCNC: 47 U/L (ref 12–45)
AST SERPL-CCNC: 59 U/L (ref 12–45)
BASOPHILS # BLD AUTO: 0.4 % (ref 0–1.8)
BASOPHILS # BLD AUTO: 0.4 % (ref 0–1.8)
BASOPHILS # BLD: 0.03 K/UL (ref 0–0.12)
BASOPHILS # BLD: 0.03 K/UL (ref 0–0.12)
BILIRUB SERPL-MCNC: 0.4 MG/DL (ref 0.1–1.5)
BILIRUB SERPL-MCNC: 0.5 MG/DL (ref 0.1–1.5)
BILIRUB UR QL STRIP.AUTO: NEGATIVE
BLD GP AB SCN SERPL QL: NORMAL
BUN SERPL-MCNC: 35 MG/DL (ref 8–22)
BUN SERPL-MCNC: 38 MG/DL (ref 8–22)
BURR CELLS BLD QL SMEAR: NORMAL
CALCIUM SERPL-MCNC: 7.8 MG/DL (ref 8.4–10.2)
CALCIUM SERPL-MCNC: 7.9 MG/DL (ref 8.4–10.2)
CHLORIDE SERPL-SCNC: 100 MMOL/L (ref 96–112)
CHLORIDE SERPL-SCNC: 102 MMOL/L (ref 96–112)
CK SERPL-CCNC: 77 U/L (ref 0–154)
CO2 SERPL-SCNC: 15 MMOL/L (ref 20–33)
CO2 SERPL-SCNC: 17 MMOL/L (ref 20–33)
COLOR UR: YELLOW
COMMENT 1642: NORMAL
CREAT SERPL-MCNC: 1.86 MG/DL (ref 0.5–1.4)
CREAT SERPL-MCNC: 2.05 MG/DL (ref 0.5–1.4)
CRP SERPL HS-MCNC: 0.73 MG/DL (ref 0–0.75)
EKG IMPRESSION: NORMAL
EOSINOPHIL # BLD AUTO: 0 K/UL (ref 0–0.51)
EOSINOPHIL # BLD AUTO: 0.01 K/UL (ref 0–0.51)
EOSINOPHIL NFR BLD: 0 % (ref 0–6.9)
EOSINOPHIL NFR BLD: 0.1 % (ref 0–6.9)
ERYTHROCYTE [DISTWIDTH] IN BLOOD BY AUTOMATED COUNT: 60 FL (ref 35.9–50)
ERYTHROCYTE [DISTWIDTH] IN BLOOD BY AUTOMATED COUNT: 66.7 FL (ref 35.9–50)
GLOBULIN SER CALC-MCNC: 3 G/DL (ref 1.9–3.5)
GLOBULIN SER CALC-MCNC: 3 G/DL (ref 1.9–3.5)
GLUCOSE BLD-MCNC: 24 MG/DL (ref 65–99)
GLUCOSE BLD-MCNC: 62 MG/DL (ref 65–99)
GLUCOSE BLD-MCNC: 76 MG/DL (ref 65–99)
GLUCOSE BLD-MCNC: 91 MG/DL (ref 65–99)
GLUCOSE SERPL-MCNC: 104 MG/DL (ref 65–99)
GLUCOSE SERPL-MCNC: 46 MG/DL (ref 65–99)
GLUCOSE UR STRIP.AUTO-MCNC: NEGATIVE MG/DL
HCT VFR BLD AUTO: 30.2 % (ref 42–52)
HCT VFR BLD AUTO: 37.2 % (ref 42–52)
HGB BLD-MCNC: 11.1 G/DL (ref 14–18)
HGB BLD-MCNC: 9.8 G/DL (ref 14–18)
HYPOCHROMIA BLD QL SMEAR: ABNORMAL
IMM GRANULOCYTES # BLD AUTO: 0.05 K/UL (ref 0–0.11)
IMM GRANULOCYTES # BLD AUTO: 0.06 K/UL (ref 0–0.11)
IMM GRANULOCYTES NFR BLD AUTO: 0.7 % (ref 0–0.9)
IMM GRANULOCYTES NFR BLD AUTO: 0.8 % (ref 0–0.9)
INR PPP: 1.05 (ref 0.87–1.13)
KETONES UR STRIP.AUTO-MCNC: NEGATIVE MG/DL
LACTATE BLD-SCNC: 2 MMOL/L (ref 0.5–2)
LACTATE BLD-SCNC: 2.9 MMOL/L (ref 0.5–2)
LACTATE BLD-SCNC: 5.4 MMOL/L (ref 0.5–2)
LEUKOCYTE ESTERASE UR QL STRIP.AUTO: NEGATIVE
LIPASE SERPL-CCNC: 61 U/L (ref 7–58)
LYMPHOCYTES # BLD AUTO: 1.87 K/UL (ref 1–4.8)
LYMPHOCYTES # BLD AUTO: 1.94 K/UL (ref 1–4.8)
LYMPHOCYTES NFR BLD: 23.5 % (ref 22–41)
LYMPHOCYTES NFR BLD: 27.7 % (ref 22–41)
MACROCYTES BLD QL SMEAR: ABNORMAL
MCH RBC QN AUTO: 30.7 PG (ref 27–33)
MCH RBC QN AUTO: 31 PG (ref 27–33)
MCHC RBC AUTO-ENTMCNC: 29.8 G/DL (ref 33.7–35.3)
MCHC RBC AUTO-ENTMCNC: 32.5 G/DL (ref 33.7–35.3)
MCV RBC AUTO: 102.8 FL (ref 81.4–97.8)
MCV RBC AUTO: 95.6 FL (ref 81.4–97.8)
MICRO URNS: NORMAL
MONOCYTES # BLD AUTO: 0.56 K/UL (ref 0–0.85)
MONOCYTES # BLD AUTO: 0.56 K/UL (ref 0–0.85)
MONOCYTES NFR BLD AUTO: 7 % (ref 0–13.4)
MONOCYTES NFR BLD AUTO: 8 % (ref 0–13.4)
NEUTROPHILS # BLD AUTO: 4.43 K/UL (ref 1.82–7.42)
NEUTROPHILS # BLD AUTO: 5.43 K/UL (ref 1.82–7.42)
NEUTROPHILS NFR BLD: 63.2 % (ref 44–72)
NEUTROPHILS NFR BLD: 68.2 % (ref 44–72)
NITRITE UR QL STRIP.AUTO: NEGATIVE
NRBC # BLD AUTO: 0 K/UL
NRBC # BLD AUTO: 0 K/UL
NRBC BLD-RTO: 0 /100 WBC
NRBC BLD-RTO: 0 /100 WBC
OVALOCYTES BLD QL SMEAR: NORMAL
PH UR STRIP.AUTO: 5 [PH] (ref 5–8)
PLATELET # BLD AUTO: 167 K/UL (ref 164–446)
PLATELET # BLD AUTO: 199 K/UL (ref 164–446)
PLATELET BLD QL SMEAR: NORMAL
PMV BLD AUTO: 11.1 FL (ref 9–12.9)
PMV BLD AUTO: 11.4 FL (ref 9–12.9)
POIKILOCYTOSIS BLD QL SMEAR: NORMAL
POLYCHROMASIA BLD QL SMEAR: NORMAL
POTASSIUM SERPL-SCNC: 4.5 MMOL/L (ref 3.6–5.5)
POTASSIUM SERPL-SCNC: 4.8 MMOL/L (ref 3.6–5.5)
PROCALCITONIN SERPL-MCNC: 0.17 NG/ML
PROT SERPL-MCNC: 5.7 G/DL (ref 6–8.2)
PROT SERPL-MCNC: 6.1 G/DL (ref 6–8.2)
PROT UR QL STRIP: NEGATIVE MG/DL
PROTHROMBIN TIME: 13.4 SEC (ref 12–14.6)
RBC # BLD AUTO: 3.16 M/UL (ref 4.7–6.1)
RBC # BLD AUTO: 3.62 M/UL (ref 4.7–6.1)
RBC BLD AUTO: PRESENT
RBC UR QL AUTO: NEGATIVE
RH BLD: NORMAL
SODIUM SERPL-SCNC: 134 MMOL/L (ref 135–145)
SODIUM SERPL-SCNC: 134 MMOL/L (ref 135–145)
SP GR UR REFRACTOMETRY: 1.02
TROPONIN T SERPL-MCNC: 283 NG/L (ref 6–19)
TROPONIN T SERPL-MCNC: 292 NG/L (ref 6–19)
TROPONIN T SERPL-MCNC: 337 NG/L (ref 6–19)
WBC # BLD AUTO: 7 K/UL (ref 4.8–10.8)
WBC # BLD AUTO: 8 K/UL (ref 4.8–10.8)

## 2021-01-02 PROCEDURE — 87186 SC STD MICRODIL/AGAR DIL: CPT

## 2021-01-02 PROCEDURE — 84484 ASSAY OF TROPONIN QUANT: CPT | Mod: 91

## 2021-01-02 PROCEDURE — 700105 HCHG RX REV CODE 258: Performed by: EMERGENCY MEDICINE

## 2021-01-02 PROCEDURE — 83605 ASSAY OF LACTIC ACID: CPT | Mod: 91

## 2021-01-02 PROCEDURE — 99285 EMERGENCY DEPT VISIT HI MDM: CPT

## 2021-01-02 PROCEDURE — 87040 BLOOD CULTURE FOR BACTERIA: CPT

## 2021-01-02 PROCEDURE — 700102 HCHG RX REV CODE 250 W/ 637 OVERRIDE(OP): Performed by: INTERNAL MEDICINE

## 2021-01-02 PROCEDURE — 81003 URINALYSIS AUTO W/O SCOPE: CPT

## 2021-01-02 PROCEDURE — 87077 CULTURE AEROBIC IDENTIFY: CPT

## 2021-01-02 PROCEDURE — A9270 NON-COVERED ITEM OR SERVICE: HCPCS | Performed by: INTERNAL MEDICINE

## 2021-01-02 PROCEDURE — 85025 COMPLETE CBC W/AUTO DIFF WBC: CPT | Mod: 91

## 2021-01-02 PROCEDURE — 99223 1ST HOSP IP/OBS HIGH 75: CPT | Performed by: INTERNAL MEDICINE

## 2021-01-02 PROCEDURE — 82962 GLUCOSE BLOOD TEST: CPT

## 2021-01-02 PROCEDURE — 86901 BLOOD TYPING SEROLOGIC RH(D): CPT

## 2021-01-02 PROCEDURE — 770020 HCHG ROOM/CARE - TELE (206)

## 2021-01-02 PROCEDURE — 94760 N-INVAS EAR/PLS OXIMETRY 1: CPT

## 2021-01-02 PROCEDURE — 82550 ASSAY OF CK (CPK): CPT

## 2021-01-02 PROCEDURE — 700105 HCHG RX REV CODE 258: Performed by: INTERNAL MEDICINE

## 2021-01-02 PROCEDURE — 93005 ELECTROCARDIOGRAM TRACING: CPT | Performed by: EMERGENCY MEDICINE

## 2021-01-02 PROCEDURE — 86850 RBC ANTIBODY SCREEN: CPT

## 2021-01-02 PROCEDURE — 85730 THROMBOPLASTIN TIME PARTIAL: CPT

## 2021-01-02 PROCEDURE — 86900 BLOOD TYPING SEROLOGIC ABO: CPT

## 2021-01-02 PROCEDURE — 36415 COLL VENOUS BLD VENIPUNCTURE: CPT

## 2021-01-02 PROCEDURE — 85610 PROTHROMBIN TIME: CPT

## 2021-01-02 PROCEDURE — 84145 PROCALCITONIN (PCT): CPT

## 2021-01-02 PROCEDURE — 700101 HCHG RX REV CODE 250: Performed by: INTERNAL MEDICINE

## 2021-01-02 PROCEDURE — 83690 ASSAY OF LIPASE: CPT

## 2021-01-02 PROCEDURE — 80053 COMPREHEN METABOLIC PANEL: CPT | Mod: 91

## 2021-01-02 PROCEDURE — 87086 URINE CULTURE/COLONY COUNT: CPT

## 2021-01-02 PROCEDURE — 86140 C-REACTIVE PROTEIN: CPT

## 2021-01-02 PROCEDURE — 71045 X-RAY EXAM CHEST 1 VIEW: CPT

## 2021-01-02 RX ORDER — SUCRALFATE 1 G/1
1 TABLET ORAL EVERY 6 HOURS
Status: DISCONTINUED | OUTPATIENT
Start: 2021-01-02 | End: 2021-01-04 | Stop reason: HOSPADM

## 2021-01-02 RX ORDER — SODIUM CHLORIDE 9 MG/ML
1000 INJECTION, SOLUTION INTRAVENOUS ONCE
Status: COMPLETED | OUTPATIENT
Start: 2021-01-02 | End: 2021-01-02

## 2021-01-02 RX ORDER — DEXTROSE AND SODIUM CHLORIDE 5; .9 G/100ML; G/100ML
INJECTION, SOLUTION INTRAVENOUS CONTINUOUS
Status: DISCONTINUED | OUTPATIENT
Start: 2021-01-02 | End: 2021-01-04

## 2021-01-02 RX ORDER — ACETAMINOPHEN 325 MG/1
650 TABLET ORAL EVERY 4 HOURS PRN
Status: ON HOLD | COMMUNITY
End: 2021-01-04 | Stop reason: SDUPTHER

## 2021-01-02 RX ORDER — ZINC SULFATE 50(220)MG
220 CAPSULE ORAL DAILY
Status: DISCONTINUED | OUTPATIENT
Start: 2021-01-03 | End: 2021-01-04 | Stop reason: HOSPADM

## 2021-01-02 RX ORDER — LISINOPRIL 20 MG/1
20 TABLET ORAL DAILY
Status: DISCONTINUED | OUTPATIENT
Start: 2021-01-03 | End: 2021-01-04 | Stop reason: HOSPADM

## 2021-01-02 RX ORDER — SODIUM CHLORIDE 9 MG/ML
INJECTION, SOLUTION INTRAVENOUS CONTINUOUS
Status: DISCONTINUED | OUTPATIENT
Start: 2021-01-02 | End: 2021-01-03

## 2021-01-02 RX ORDER — TAMSULOSIN HYDROCHLORIDE 0.4 MG/1
0.4 CAPSULE ORAL
Status: DISCONTINUED | OUTPATIENT
Start: 2021-01-03 | End: 2021-01-04 | Stop reason: HOSPADM

## 2021-01-02 RX ORDER — INSULIN LISPRO 100 [IU]/ML
2-10 INJECTION, SOLUTION INTRAVENOUS; SUBCUTANEOUS
Status: ON HOLD | COMMUNITY
End: 2021-01-04 | Stop reason: SDUPTHER

## 2021-01-02 RX ORDER — MIRTAZAPINE 15 MG/1
15 TABLET, ORALLY DISINTEGRATING ORAL
Status: DISCONTINUED | OUTPATIENT
Start: 2021-01-02 | End: 2021-01-04 | Stop reason: HOSPADM

## 2021-01-02 RX ORDER — ACETAMINOPHEN 325 MG/1
650 TABLET ORAL EVERY 6 HOURS PRN
Status: DISCONTINUED | OUTPATIENT
Start: 2021-01-02 | End: 2021-01-04 | Stop reason: HOSPADM

## 2021-01-02 RX ORDER — ONDANSETRON 2 MG/ML
4 INJECTION INTRAMUSCULAR; INTRAVENOUS EVERY 4 HOURS PRN
Status: DISCONTINUED | OUTPATIENT
Start: 2021-01-02 | End: 2021-01-04 | Stop reason: HOSPADM

## 2021-01-02 RX ORDER — BISACODYL 10 MG
10 SUPPOSITORY, RECTAL RECTAL
Status: DISCONTINUED | OUTPATIENT
Start: 2021-01-02 | End: 2021-01-04 | Stop reason: HOSPADM

## 2021-01-02 RX ORDER — SODIUM CHLORIDE, SODIUM LACTATE, POTASSIUM CHLORIDE, AND CALCIUM CHLORIDE .6; .31; .03; .02 G/100ML; G/100ML; G/100ML; G/100ML
30 INJECTION, SOLUTION INTRAVENOUS
Status: DISCONTINUED | OUTPATIENT
Start: 2021-01-02 | End: 2021-01-04 | Stop reason: HOSPADM

## 2021-01-02 RX ORDER — AMOXICILLIN 250 MG
2 CAPSULE ORAL 2 TIMES DAILY
Status: DISCONTINUED | OUTPATIENT
Start: 2021-01-02 | End: 2021-01-04 | Stop reason: HOSPADM

## 2021-01-02 RX ORDER — ZINC SULFATE 50(220)MG
220 CAPSULE ORAL DAILY
Status: ON HOLD | COMMUNITY
End: 2021-01-04

## 2021-01-02 RX ORDER — ONDANSETRON 4 MG/1
4 TABLET, ORALLY DISINTEGRATING ORAL EVERY 4 HOURS PRN
Status: DISCONTINUED | OUTPATIENT
Start: 2021-01-02 | End: 2021-01-04 | Stop reason: HOSPADM

## 2021-01-02 RX ORDER — POLYETHYLENE GLYCOL 3350 17 G/17G
1 POWDER, FOR SOLUTION ORAL
Status: DISCONTINUED | OUTPATIENT
Start: 2021-01-02 | End: 2021-01-04 | Stop reason: HOSPADM

## 2021-01-02 RX ORDER — DEXTROSE MONOHYDRATE 25 G/50ML
50 INJECTION, SOLUTION INTRAVENOUS
Status: DISCONTINUED | OUTPATIENT
Start: 2021-01-02 | End: 2021-01-03

## 2021-01-02 RX ADMIN — SUCRALFATE 1 G: 1 TABLET ORAL at 16:08

## 2021-01-02 RX ADMIN — DEXTROSE MONOHYDRATE 50 ML: 25 INJECTION, SOLUTION INTRAVENOUS at 17:05

## 2021-01-02 RX ADMIN — SODIUM CHLORIDE 1000 ML: 9 INJECTION, SOLUTION INTRAVENOUS at 12:47

## 2021-01-02 RX ADMIN — MIRTAZAPINE 15 MG: 15 TABLET, ORALLY DISINTEGRATING ORAL at 20:31

## 2021-01-02 RX ADMIN — SODIUM CHLORIDE 1000 ML: 9 INJECTION, SOLUTION INTRAVENOUS at 20:11

## 2021-01-02 RX ADMIN — SUCRALFATE 1 G: 1 TABLET ORAL at 23:54

## 2021-01-02 RX ADMIN — METOPROLOL TARTRATE 25 MG: 25 TABLET, FILM COATED ORAL at 17:49

## 2021-01-02 RX ADMIN — DEXTROSE AND SODIUM CHLORIDE: 5; 900 INJECTION, SOLUTION INTRAVENOUS at 17:26

## 2021-01-02 RX ADMIN — SODIUM CHLORIDE: 9 INJECTION, SOLUTION INTRAVENOUS at 16:02

## 2021-01-02 ASSESSMENT — ENCOUNTER SYMPTOMS
ABDOMINAL PAIN: 0
COUGH: 0
SORE THROAT: 0
DIZZINESS: 0
SENSORY CHANGE: 0
MYALGIAS: 0
DEPRESSION: 0
SPEECH CHANGE: 0
DIARRHEA: 0
HEADACHES: 0
CONSTIPATION: 0
WEAKNESS: 0
VOMITING: 0
NERVOUS/ANXIOUS: 0
PHOTOPHOBIA: 0
SHORTNESS OF BREATH: 1
CLAUDICATION: 0
NAUSEA: 0
HEARTBURN: 0
CHILLS: 0
FEVER: 0
BLURRED VISION: 0
INSOMNIA: 0

## 2021-01-02 ASSESSMENT — PAIN DESCRIPTION - PAIN TYPE: TYPE: ACUTE PAIN

## 2021-01-02 ASSESSMENT — FIBROSIS 4 INDEX
FIB4 SCORE: 3.7
FIB4 SCORE: 2.6

## 2021-01-02 NOTE — ED TRIAGE NOTES
Pt to ed from [facility].  Pt was admitted to [facility] with gi bleed, unable to scope due to covid positive.  Pt c/o decreased po intake and worsening weakness

## 2021-01-02 NOTE — ED NOTES
Fior from Lab called with critical result of Troponin 337 at 1150. Critical lab result read back to fior.   Dr. Colon notified of critical lab result at 1150.  Critical lab result read back by Dr. Colon.

## 2021-01-02 NOTE — H&P
Hospital Medicine History & Physical Note    Date of Service  1/2/2021    Primary Care Physician  Pcp Pt States None    Consultants  None  Elevated troponin discussed with Cardiology, trend troponin and monitor on tele.    Code Status  Full Code    Chief Complaint  Chief Complaint   Patient presents with   • Weakness       History of Presenting Illness  75 y.o. male who presented 1/2/2021 with worsening fatigue and complaints of trouble breathing while at skilled nursing recovering from GI bleeding and concurrent COVID.  He states he has been feeling more and fatigued and short of breath but he has been eating and drinking an minimally since admission.  He was here recently with GI bleeding and taken off his anticoagulation Eliquis for atrial fibrillation.  He has a chronic indwelling crooks without evidence of infection on UA.  H/h has improved since discharge.    Review of Systems  Review of Systems   Constitutional: Positive for malaise/fatigue. Negative for chills and fever.   HENT: Negative for congestion and sore throat.    Eyes: Negative for blurred vision and photophobia.   Respiratory: Positive for shortness of breath (mild). Negative for cough.    Cardiovascular: Negative for chest pain, claudication and leg swelling.   Gastrointestinal: Negative for abdominal pain, constipation, diarrhea, heartburn, melena, nausea and vomiting.   Genitourinary: Negative for dysuria and hematuria.   Musculoskeletal: Negative for joint pain and myalgias.   Skin: Negative for itching and rash.   Neurological: Negative for dizziness, sensory change, speech change, weakness and headaches.   Psychiatric/Behavioral: Negative for depression. The patient is not nervous/anxious and does not have insomnia.        Past Medical History   has a past medical history of A-fib (HCC), COVID-19, Diabetes (HCC), and GI (gastrointestinal bleed).    Surgical History   has no past surgical history on file.     Family History  Family history is  unknown by patient.     Social History   reports that he has never smoked. He has never used smokeless tobacco. He reports that he does not drink alcohol or use drugs.    Allergies  Allergies   Allergen Reactions   • Pcn [Penicillins] Anaphylaxis and Hives     Hives on back of neck       Medications  Prior to Admission Medications   Prescriptions Last Dose Informant Patient Reported? Taking?   Alogliptin Benzoate 12.5 MG Tab 1/2/2021 at 0800 MAR from Other Facility Yes No   Sig: Take 12.5 mg by mouth every day.   Cyanocobalamin (VITAMIN B12) 1000 MCG Tab CR 1/2/2021 at 0800 MAR from Other Facility Yes No   Sig: Take 1,000 mcg by mouth every day.   Multiple Vitamin (MULTI-VITAMIN PO) 1/2/2021 at 0800 MAR from Other Facility Yes Yes   Sig: Take 1 Tab by mouth every day.   acetaminophen (TYLENOL) 325 MG Tab 1/2/2021 at AM MAR from Other Facility Yes Yes   Sig: Take 650 mg by mouth every four hours as needed.   atorvastatin (LIPITOR) 40 MG Tab 1/1/2021 at 2000 MAR from Other Facility Yes No   Sig: Take 40 mg by mouth every evening.   folic acid (FOLVITE) 1 MG Tab 1/2/2021 at 0800 MAR from Other Facility No No   Sig: Take 1 Tab by mouth every day.   glipiZIDE (GLUCOTROL) 5 MG Tab 1/2/2021 at 0730 MAR from Other Facility Yes No   Sig: Take 5 mg by mouth 2 times a day.   insulin lispro (HUMALOG KWIKPEN) 100 UNIT/ML Solution Pen-injector injection PEN 1/2/2021 at 0630 MAR from Other Facility Yes Yes   Sig: Inject 2-10 Units under the skin 3 times a day before meals. Sliding Scale  150-200= 2 units  201-250= 4 units  251-300= 6 units  301-350= 8 units  351-400= 10 units   insulin regular (HUMULIN R) 100 Unit/mL Solution NOT TAKING at NOT TAKING MAR from Other Facility No No   Sig: Inject 1-6 Units under the skin 4 Times a Day,Before Meals and at Bedtime.   Patient not taking: Reported on 1/2/2021   lisinopril (PRINIVIL) 20 MG Tab 1/2/2021 at 0800 MAR from Other Facility No No   Sig: Take 1 Tab by mouth every day.   metFORMIN  (GLUCOPHAGE) 500 MG Tab 1/2/2021 at 0700 MAR from Other Facility Yes No   Sig: Take 500 mg by mouth 2 times a day, with meals.   metoprolol (LOPRESSOR) 25 MG Tab 1/2/2021 at 0800 MAR from Other Facility No No   Sig: Take 1 Tab by mouth 2 Times a Day.   mirtazapine (REMERON) 15 MG Tab 1/1/2021 at 2000 MAR from Other Facility No No   Sig: Take 1 Tab by mouth every bedtime.   sucralfate (CARAFATE) 1 GM Tab 1/2/2021 at 0600 MAR from Other Facility No No   Sig: Take 1 Tab by mouth every 6 hours.   tamsulosin (FLOMAX) 0.4 MG capsule 1/2/2021 at 0800 MAR from Other Facility No No   Sig: Take 1 Cap by mouth 1/2 hour after breakfast.   vitamin D (CHOLECALCIFEROL) 1000 Unit (25 mcg) Tab 1/2/2021 at 0800 MAR from Other Facility Yes No   Sig: Take 1,000 Units by mouth every day.   zinc sulfate (ZINCATE) 220 (50 Zn) MG Cap 1/2/2021 at 0800 MAR from Other Facility Yes Yes   Sig: Take 220 mg by mouth every day.      Facility-Administered Medications: None       Physical Exam  Temp:  [37.2 °C (99 °F)] 37.2 °C (99 °F)  Pulse:  [] 102  Resp:  [18-28] 18  BP: (107-135)/(68-70) 114/68  SpO2:  [91 %-99 %] 99 %    Physical Exam  Vitals signs and nursing note reviewed.   Constitutional:       General: He is not in acute distress.     Appearance: Normal appearance.   HENT:      Head: Normocephalic and atraumatic.   Eyes:      General: No scleral icterus.     Extraocular Movements: Extraocular movements intact.   Neck:      Musculoskeletal: Normal range of motion and neck supple.   Cardiovascular:      Rate and Rhythm: Normal rate and regular rhythm.      Pulses: Normal pulses.      Heart sounds: Normal heart sounds. No murmur.   Pulmonary:      Effort: Pulmonary effort is normal. No respiratory distress.      Breath sounds: Normal breath sounds. No wheezing, rhonchi or rales.   Abdominal:      General: Abdomen is flat. Bowel sounds are normal. There is no distension.      Palpations: Abdomen is soft.      Tenderness: There is no  rebound.   Musculoskeletal:         General: No swelling or tenderness.   Lymphadenopathy:      Cervical: No cervical adenopathy.   Skin:     Coloration: Skin is not jaundiced.      Findings: No erythema.   Neurological:      General: No focal deficit present.      Mental Status: He is alert and oriented to person, place, and time. Mental status is at baseline.      Cranial Nerves: No cranial nerve deficit.   Psychiatric:         Mood and Affect: Mood normal.         Behavior: Behavior normal.         Laboratory:  Recent Labs     01/02/21  1105   WBC 7.0   RBC 3.16*   HEMOGLOBIN 9.8*   HEMATOCRIT 30.2*   MCV 95.6   MCH 31.0   MCHC 32.5*   RDW 60.0*   PLATELETCT 167   MPV 11.4     Recent Labs     01/02/21  1105   SODIUM 134*   POTASSIUM 4.5   CHLORIDE 102   CO2 17*   GLUCOSE 46*   BUN 38*   CREATININE 2.05*   CALCIUM 7.8*     Recent Labs     01/02/21  1105   ALTSGPT 29   ASTSGOT 47*   ALKPHOSPHAT 67   TBILIRUBIN 0.4   LIPASE 61*   GLUCOSE 46*     Recent Labs     01/02/21  1105   APTT 30.7   INR 1.05     No results for input(s): NTPROBNP in the last 72 hours.      Recent Labs     01/02/21  1105   TROPONINT 337*       Imaging:  DX-CHEST-PORTABLE (1 VIEW)   Final Result      No acute cardiopulmonary abnormality.            Assessment/Plan:  I anticipate this patient will require at least two midnights for appropriate medical management, necessitating inpatient admission.    COVID-19- (present on admission)  Assessment & Plan  Currently on room air  Was at SNF recovering from this      Acute kidney injury (HCC)  Assessment & Plan  Cr baseline less than 1.0  Currently 2.05  Aggressive IV hydration and trend  Patient is dehydrated.      Lactic acidosis  Assessment & Plan  Patient is not septic  Likely elevated secondary to dehydration.      Elevated troponin  Assessment & Plan  In setting of significant acute kidney injury  337 - discussed with cardiology, continue to trend, no intervention at this time, no heparin, no  ASA  Avoid ASA/heparin given recent GI bleed      Dehydration  Assessment & Plan  IV hydration        Hyperlipidemia- (present on admission)  Assessment & Plan  Lipitor      Hypertension- (present on admission)  Assessment & Plan  Continue with home medications      Type 2 diabetes mellitus, with long-term current use of insulin (HCC)- (present on admission)  Assessment & Plan  SSI, diabetic diet  Hold glipizide and metformin for now.      Debility- (present on admission)  Assessment & Plan  PT/OT evaluations  Poor PO intake since he transferred to SNF.      Urinary retention- (present on admission)  Assessment & Plan  Chronic indwelling crooks  UA without evidence of infection

## 2021-01-02 NOTE — ASSESSMENT & PLAN NOTE
In setting of significant acute kidney injury  337 - discussed with cardiology, continue to trend, no intervention at this time, no heparin, no ASA  Avoid ASA/heparin given recent GI bleed

## 2021-01-02 NOTE — ED PROVIDER NOTES
ED Provider Note    ED Provider Note    Scribed for Vj Colon MD by Vj Colon M.D.. 1/2/2021, 10:51 AM.    Primary care provider: Pcp Pt States None  Means of arrival: EMS  History obtained from: Patient  History limited by: None    CHIEF COMPLAINT  Chief Complaint   Patient presents with   • Weakness       HPI  Lakhwinder Vega is a 75 y.o. male who presents to the Emergency Department for evaluation of generalized fatigue and weakness.  Patient has been hospitalized over the [place] holiday for significant anemia secondary to GI bleed.  Found at that time to be COVID-19 positive.  Patient has no respiratory symptoms, however at the skilled nursing facility where he has been rehabbing he notes generalized weakness and fatigue getting worse since hospital discharge.  He notes today subjective fever, T-max at facility 99.  He is tachycardic and appears pale.  Patient notes no apparent acute blood in stool though he was transfused for the GI bleed over previous hospitalization.  Notes diarrhea at the facility improving with Imodium.  Patient also has a urinary retention a Porter catheter was placed last month, this is still in place upon arrival and there is somewhat foul odor with regard to the urine.  Patient notes no dyspnea but he does appear to be mildly tachypneic.  Denies a cough, denies any acute vomiting.  Patient had been anticoagulated for his history of atrial fibrillation but this has been held since the GI bleed.    REVIEW OF SYSTEMS  Pertinent positives include subjective fever, fatigue, malaise, poor appetite, mild dyspnea, exercise intolerance, diarrhea, Porter catheter in place. Pertinent negatives include no chest pain, no cough, no vomiting.  All other systems reviewed and negative.    PAST MEDICAL HISTORY   has a past medical history of A-fib (HCC), COVID-19, Diabetes (HCC), and GI (gastrointestinal bleed).    SURGICAL HISTORY  patient denies any surgical  history    SOCIAL HISTORY  Social History     Tobacco Use   • Smoking status: Never Smoker   • Smokeless tobacco: Never Used   Substance Use Topics   • Alcohol use: Never     Frequency: Never   • Drug use: Never      Social History     Substance and Sexual Activity   Drug Use Never       FAMILY HISTORY  Family History   Family history unknown: Yes       CURRENT MEDICATIONS  Home Medications     Reviewed by Liliya George (Pharmacy Tech) on 01/02/21 at 1050  Med List Status: Complete   Medication Last Dose Status   acetaminophen (TYLENOL) 325 MG Tab 1/2/2021 Active   Alogliptin Benzoate 12.5 MG Tab 1/2/2021 Active   atorvastatin (LIPITOR) 40 MG Tab 1/1/2021 Active   Cyanocobalamin (VITAMIN B12) 1000 MCG Tab CR 1/2/2021 Active   folic acid (FOLVITE) 1 MG Tab 1/2/2021 Active   glipiZIDE (GLUCOTROL) 5 MG Tab 1/2/2021 Active   insulin lispro (HUMALOG KWIKPEN) 100 UNIT/ML Solution Pen-injector injection PEN 1/2/2021 Active   insulin regular (HUMULIN R) 100 Unit/mL Solution NOT TAKING Active   lisinopril (PRINIVIL) 20 MG Tab 1/2/2021 Active   metFORMIN (GLUCOPHAGE) 500 MG Tab 1/2/2021 Active   metoprolol (LOPRESSOR) 25 MG Tab 1/2/2021 Active   mirtazapine (REMERON) 15 MG Tab 1/1/2021 Active   Multiple Vitamin (MULTI-VITAMIN PO) 1/2/2021 Active   sucralfate (CARAFATE) 1 GM Tab 1/2/2021 Active   tamsulosin (FLOMAX) 0.4 MG capsule 1/2/2021 Active   vitamin D (CHOLECALCIFEROL) 1000 Unit (25 mcg) Tab 1/2/2021 Active   zinc sulfate (ZINCATE) 220 (50 Zn) MG Cap 1/2/2021 Active                ALLERGIES  Allergies   Allergen Reactions   • Pcn [Penicillins] Anaphylaxis and Hives     Hives on back of neck       PHYSICAL EXAM  VITAL SIGNS: /68   Pulse (!) 106   Temp 37.2 °C (99 °F)   Resp (!) 25   Wt 75 kg (165 lb 5.5 oz)   SpO2 96%   BMI 25.14 kg/m²     General: Alert, mild acute distress  Skin: Warm, dry, pale  Head: Normocephalic, atraumatic  Neck: Trachea midline, no tenderness  Eye: PERRL, conjunctival  pallor  ENMT: Oral mucosa pale and dry, no pharyngeal erythema or exudate  Cardiovascular: S1, S2, mildly tachycardic, otherwise regular rate and rhythm, No murmur, Normal peripheral perfusion  Respiratory: Lungs CTA, respirations are tachypneic, no retractions, no rales, no rhonchi, no accessory muscle use, breath sounds are equal  Gastrointestinal: Soft, nontender, non distended  Musculoskeletal: No swelling, no deformity  Neurological: Alert and oriented to person, place, time, and situation  Lymphatics: No lymphadenopathy  Psychiatric: Cooperative, appropriate mood & affect      DIAGNOSTIC STUDIES/PROCEDURES    LABS  Results for orders placed or performed during the hospital encounter of 01/02/21   CBC WITH DIFFERENTIAL   Result Value Ref Range    WBC 7.0 4.8 - 10.8 K/uL    RBC 3.16 (L) 4.70 - 6.10 M/uL    Hemoglobin 9.8 (L) 14.0 - 18.0 g/dL    Hematocrit 30.2 (L) 42.0 - 52.0 %    MCV 95.6 81.4 - 97.8 fL    MCH 31.0 27.0 - 33.0 pg    MCHC 32.5 (L) 33.7 - 35.3 g/dL    RDW 60.0 (H) 35.9 - 50.0 fL    Platelet Count 167 164 - 446 K/uL    MPV 11.4 9.0 - 12.9 fL    Neutrophils-Polys 63.20 44.00 - 72.00 %    Lymphocytes 27.70 22.00 - 41.00 %    Monocytes 8.00 0.00 - 13.40 %    Eosinophils 0.00 0.00 - 6.90 %    Basophils 0.40 0.00 - 1.80 %    Immature Granulocytes 0.70 0.00 - 0.90 %    Nucleated RBC 0.00 /100 WBC    Neutrophils (Absolute) 4.43 1.82 - 7.42 K/uL    Lymphs (Absolute) 1.94 1.00 - 4.80 K/uL    Monos (Absolute) 0.56 0.00 - 0.85 K/uL    Eos (Absolute) 0.00 0.00 - 0.51 K/uL    Baso (Absolute) 0.03 0.00 - 0.12 K/uL    Immature Granulocytes (abs) 0.05 0.00 - 0.11 K/uL    NRBC (Absolute) 0.00 K/uL   COMP METABOLIC PANEL   Result Value Ref Range    Sodium 134 (L) 135 - 145 mmol/L    Potassium 4.5 3.6 - 5.5 mmol/L    Chloride 102 96 - 112 mmol/L    Co2 17 (L) 20 - 33 mmol/L    Anion Gap 15.0 7.0 - 16.0    Glucose 46 (L) 65 - 99 mg/dL    Bun 38 (H) 8 - 22 mg/dL    Creatinine 2.05 (H) 0.50 - 1.40 mg/dL    Calcium 7.8  (L) 8.4 - 10.2 mg/dL    AST(SGOT) 47 (H) 12 - 45 U/L    ALT(SGPT) 29 2 - 50 U/L    Alkaline Phosphatase 67 30 - 99 U/L    Total Bilirubin 0.4 0.1 - 1.5 mg/dL    Albumin 2.7 (L) 3.2 - 4.9 g/dL    Total Protein 5.7 (L) 6.0 - 8.2 g/dL    Globulin 3.0 1.9 - 3.5 g/dL    A-G Ratio 0.9 g/dL   TROPONIN   Result Value Ref Range    Troponin T 337 (H) 6 - 19 ng/L   LIPASE   Result Value Ref Range    Lipase 61 (H) 7 - 58 U/L   APTT   Result Value Ref Range    APTT 30.7 24.7 - 36.0 sec   PROTHROMBIN TIME (INR)   Result Value Ref Range    PT 13.4 12.0 - 14.6 sec    INR 1.05 0.87 - 1.13   COD (ADULT)   Result Value Ref Range    ABO Grouping Only O     Rh Grouping Only POS     Antibody Screen-Cod NEG    URINALYSIS    Specimen: Urine   Result Value Ref Range    Color Yellow     Character Clear     Ph 5.0 5.0 - 8.0    Glucose Negative Negative mg/dL    Ketones Negative Negative mg/dL    Protein Negative Negative mg/dL    Bilirubin Negative Negative    Nitrite Negative Negative    Leukocyte Esterase Negative Negative    Occult Blood Negative Negative    Micro Urine Req see below    CRP QUANTITIVE (NON-CARDIAC)   Result Value Ref Range    Stat C-Reactive Protein 0.73 0.00 - 0.75 mg/dL   PROCALCITONIN   Result Value Ref Range    Procalcitonin 0.17 <0.25 ng/mL   LACTIC ACID   Result Value Ref Range    Lactic Acid 2.0 0.5 - 2.0 mmol/L   REFRACTOMETER SG   Result Value Ref Range    Specific Gravity 1.018    ESTIMATED GFR   Result Value Ref Range    GFR If  38 (A) >60 mL/min/1.73 m 2    GFR If Non  32 (A) >60 mL/min/1.73 m 2   EKG (NOW)   Result Value Ref Range    Report       [facility] Emergency Dept.    Test Date:  2021  Pt Name:    EMANUEL LOCKE              Department: [facility]  MRN:        [id number]                      [facility]  Gender:     Male                         Technician: [id number]  :        1945                   Requested By:JAYDON DUARTE  KUSH  Order #:    [id number]                    Reading MD:    Measurements  Intervals                                Axis  Rate:       122                          P:          35  CT:         121                          QRS:        -71  QRSD:       98                           T:          181  QT:         332  QTc:        473    Interpretive Statements  Sinus tachycardia  Left anterior fascicular block  Abnormal R-wave progression, late transition  Abnrm T, probable ischemia, anterolateral lds  Compared to ECG 12/24/2020 19:01:16  Left anterior fascicular block now present  Ventricular premature complex(es) no longer present  Possible ischemia still present        All labs reviewed by me, acute kidney injury noted, elevated troponin, otherwise unremarkable.    EKG  12 Lead EKG obtained at 1054 and interpreted by me to show:  Rhythm: Sinus tachycardia  Rate: 122  Axis: Normal  Intervals: Normal  Q Waves: Normal  No diagnostic ST segment elevation    Clinical Impression: Normal EKG  Compared to December 24, 2020    RADIOLOGY  DX-CHEST-PORTABLE (1 VIEW)   Final Result      No acute cardiopulmonary abnormality.        The radiologist's interpretation of all radiological studies have been reviewed by me.    COURSE & MEDICAL DECISION MAKING  Pertinent Labs & Imaging studies reviewed. (See chart for details)    10:51 AM - Patient seen and examined at bedside. Patient will be treated with fluid resuscitation; Ordered septic work-up and coags to evaluate his symptoms. The differential diagnoses include but are not limited to: Symptomatic anemia, septicemia, UTI, GI bleed, ACS    1210: Patient reassessed, updated with work-up results.  Thankfully no evidence of sepsis nor significant anemia.  Patient does have T wave inversions but these are not new compared the previous EKG, troponin is however sharply elevated.  He notes exercise intolerance and dyspnea and given the elevated troponin is certainly could be consistent  with NSTEMI.  At this point however given recent GI bleed I think it is reasonable to hold aspirin/anticoagulation.  Will consult with cardiology.  Evidence of acute kidney injury noted.  I have ordered a liter of normal saline    0029: I spoke with the on-call cardiologist Dr. Kiran, he concurs risk of anticoagulation outweighs any benefit.  Recommends trending cardiac enzymes.     HYDRATION: Based on the patient's presentation of Dehydration and Tachycardia the patient was given IV fluids. IV Hydration was used because oral hydration was not as rapid as required. Upon recheck following hydration, the patient was Doing somewhat better, tachycardia improving, heart rate currently 102.    Patient Vitals for the past 24 hrs:   BP Temp Pulse Resp SpO2 Weight   01/02/21 1218 -- -- (!) 106 (!) 25 96 % --   01/02/21 1119 -- -- (!) 120 (!) 28 97 % --   01/02/21 1032 107/68 37.2 °C (99 °F) (!) 118 20 98 % 75 kg (165 lb 5.5 oz)         Decision Making:  This is a 75 y.o. year old male who presents with generalized fatigue and mild dyspnea for the last week.  He is tachypneic on my assessment but not hypoxic.  Lungs are clear and x-ray imaging is thankfully unremarkable.  Patient is Covid positive, thankfully no evidence suggestive of Covid pneumonia.  No lactic acidosis, no leukocytosis; no evidence of septicemia.  He is dyspneic and has T wave inversions which certainly could be concerning for ACS however EKG is unchanged from previous, he denies chest pain, certainly not typical for non-ST elevation MI.  Of note patient has evidence of acute kidney injury, likely from dehydration given his clinical exam.  I have initiated both oral and IV fluid resuscitation here in the ED.  I suspect potentially the troponin is elevated secondary to the acute kidney injury, cardiac enzymes will need to be trended.  I consulted with cardiology who concurs risks of aspirin/anticoagulation outweigh any benefit given low suspicion for NSTEMI  and given recent GI bleed.    Patient is admitted to telemetry level of care under the care of the hospitalist Dr. Moise in improved but guarded condition    FINAL IMPRESSION  1. Elevated troponin    2. Acute dyspnea    3. Sinus tachycardia by electrocardiogram    4. Acute kidney injury (nontraumatic) (HCC)    5. Dehydration          Vj MONTIEL M.D. (Scribe), am scribing for, and in the presence of, Vj Colon MD.    Electronically signed by: Vj Colon M.D. (Scribe), 1/2/2021    Vj MONTIEL MD personally performed the services described in this documentation, as scribed by Vj Colon M.D. in my presence, and it is both accurate and complete    The note accurately reflects work and decisions made by me.  Vj Colon M.D.  1/2/2021  12:37 PM

## 2021-01-03 PROBLEM — E16.2 HYPOGLYCEMIA: Status: ACTIVE | Noted: 2021-01-03

## 2021-01-03 LAB
ANION GAP SERPL CALC-SCNC: 14 MMOL/L (ref 7–16)
BASOPHILS # BLD AUTO: 0.4 % (ref 0–1.8)
BASOPHILS # BLD: 0.02 K/UL (ref 0–0.12)
BUN SERPL-MCNC: 30 MG/DL (ref 8–22)
CALCIUM SERPL-MCNC: 7.3 MG/DL (ref 8.4–10.2)
CHLORIDE SERPL-SCNC: 104 MMOL/L (ref 96–112)
CO2 SERPL-SCNC: 15 MMOL/L (ref 20–33)
CREAT SERPL-MCNC: 1.39 MG/DL (ref 0.5–1.4)
EOSINOPHIL # BLD AUTO: 0.01 K/UL (ref 0–0.51)
EOSINOPHIL NFR BLD: 0.2 % (ref 0–6.9)
ERYTHROCYTE [DISTWIDTH] IN BLOOD BY AUTOMATED COUNT: 61 FL (ref 35.9–50)
ERYTHROCYTE [DISTWIDTH] IN BLOOD BY AUTOMATED COUNT: 63.9 FL (ref 35.9–50)
GLUCOSE BLD-MCNC: 127 MG/DL (ref 65–99)
GLUCOSE BLD-MCNC: 138 MG/DL (ref 65–99)
GLUCOSE BLD-MCNC: 58 MG/DL (ref 65–99)
GLUCOSE BLD-MCNC: 78 MG/DL (ref 65–99)
GLUCOSE BLD-MCNC: 91 MG/DL (ref 65–99)
GLUCOSE SERPL-MCNC: 74 MG/DL (ref 65–99)
HCT VFR BLD AUTO: 27.6 % (ref 42–52)
HCT VFR BLD AUTO: 29.7 % (ref 42–52)
HGB BLD-MCNC: 8.8 G/DL (ref 14–18)
HGB BLD-MCNC: 9.2 G/DL (ref 14–18)
IMM GRANULOCYTES # BLD AUTO: 0.03 K/UL (ref 0–0.11)
IMM GRANULOCYTES NFR BLD AUTO: 0.6 % (ref 0–0.9)
LACTATE BLD-SCNC: 1.8 MMOL/L (ref 0.5–2)
LACTATE BLD-SCNC: 1.8 MMOL/L (ref 0.5–2)
LACTATE BLD-SCNC: 2.6 MMOL/L (ref 0.5–2)
LYMPHOCYTES # BLD AUTO: 1.35 K/UL (ref 1–4.8)
LYMPHOCYTES NFR BLD: 25 % (ref 22–41)
MCH RBC QN AUTO: 30.6 PG (ref 27–33)
MCH RBC QN AUTO: 30.7 PG (ref 27–33)
MCHC RBC AUTO-ENTMCNC: 31 G/DL (ref 33.7–35.3)
MCHC RBC AUTO-ENTMCNC: 31.9 G/DL (ref 33.7–35.3)
MCV RBC AUTO: 95.8 FL (ref 81.4–97.8)
MCV RBC AUTO: 99 FL (ref 81.4–97.8)
MONOCYTES # BLD AUTO: 0.47 K/UL (ref 0–0.85)
MONOCYTES NFR BLD AUTO: 8.7 % (ref 0–13.4)
NEUTROPHILS # BLD AUTO: 3.53 K/UL (ref 1.82–7.42)
NEUTROPHILS NFR BLD: 65.1 % (ref 44–72)
NRBC # BLD AUTO: 0 K/UL
NRBC BLD-RTO: 0 /100 WBC
PLATELET # BLD AUTO: 152 K/UL (ref 164–446)
PLATELET # BLD AUTO: 157 K/UL (ref 164–446)
PMV BLD AUTO: 11.4 FL (ref 9–12.9)
PMV BLD AUTO: 11.5 FL (ref 9–12.9)
POTASSIUM SERPL-SCNC: 4.4 MMOL/L (ref 3.6–5.5)
RBC # BLD AUTO: 2.88 M/UL (ref 4.7–6.1)
RBC # BLD AUTO: 3 M/UL (ref 4.7–6.1)
SODIUM SERPL-SCNC: 133 MMOL/L (ref 135–145)
WBC # BLD AUTO: 5.4 K/UL (ref 4.8–10.8)
WBC # BLD AUTO: 5.8 K/UL (ref 4.8–10.8)

## 2021-01-03 PROCEDURE — 770020 HCHG ROOM/CARE - TELE (206)

## 2021-01-03 PROCEDURE — 85027 COMPLETE CBC AUTOMATED: CPT

## 2021-01-03 PROCEDURE — 83605 ASSAY OF LACTIC ACID: CPT | Mod: 91

## 2021-01-03 PROCEDURE — A9270 NON-COVERED ITEM OR SERVICE: HCPCS | Performed by: INTERNAL MEDICINE

## 2021-01-03 PROCEDURE — 700105 HCHG RX REV CODE 258: Performed by: INTERNAL MEDICINE

## 2021-01-03 PROCEDURE — 85025 COMPLETE CBC W/AUTO DIFF WBC: CPT

## 2021-01-03 PROCEDURE — 700102 HCHG RX REV CODE 250 W/ 637 OVERRIDE(OP): Performed by: INTERNAL MEDICINE

## 2021-01-03 PROCEDURE — 80048 BASIC METABOLIC PNL TOTAL CA: CPT

## 2021-01-03 PROCEDURE — 82962 GLUCOSE BLOOD TEST: CPT

## 2021-01-03 PROCEDURE — 99232 SBSQ HOSP IP/OBS MODERATE 35: CPT | Performed by: STUDENT IN AN ORGANIZED HEALTH CARE EDUCATION/TRAINING PROGRAM

## 2021-01-03 RX ORDER — DEXTROSE MONOHYDRATE 25 G/50ML
50 INJECTION, SOLUTION INTRAVENOUS
Status: DISCONTINUED | OUTPATIENT
Start: 2021-01-03 | End: 2021-01-04 | Stop reason: HOSPADM

## 2021-01-03 RX ADMIN — ACETAMINOPHEN 650 MG: 325 TABLET, FILM COATED ORAL at 01:42

## 2021-01-03 RX ADMIN — SUCRALFATE 1 G: 1 TABLET ORAL at 06:08

## 2021-01-03 RX ADMIN — SUCRALFATE 1 G: 1 TABLET ORAL at 17:01

## 2021-01-03 RX ADMIN — ZINC SULFATE 220 MG (50 MG) CAPSULE 220 MG: CAPSULE at 06:00

## 2021-01-03 RX ADMIN — MIRTAZAPINE 15 MG: 15 TABLET, ORALLY DISINTEGRATING ORAL at 20:50

## 2021-01-03 RX ADMIN — METOPROLOL TARTRATE 25 MG: 25 TABLET, FILM COATED ORAL at 06:08

## 2021-01-03 RX ADMIN — SENNOSIDES-DOCUSATE SODIUM TAB 8.6-50 MG 2 TABLET: 8.6-5 TAB at 06:07

## 2021-01-03 RX ADMIN — METOPROLOL TARTRATE 25 MG: 25 TABLET, FILM COATED ORAL at 17:02

## 2021-01-03 RX ADMIN — SENNOSIDES-DOCUSATE SODIUM TAB 8.6-50 MG 2 TABLET: 8.6-5 TAB at 17:01

## 2021-01-03 RX ADMIN — DEXTROSE AND SODIUM CHLORIDE: 5; 900 INJECTION, SOLUTION INTRAVENOUS at 21:33

## 2021-01-03 RX ADMIN — ACETAMINOPHEN 650 MG: 325 TABLET, FILM COATED ORAL at 17:01

## 2021-01-03 RX ADMIN — SUCRALFATE 1 G: 1 TABLET ORAL at 12:06

## 2021-01-03 RX ADMIN — TAMSULOSIN HYDROCHLORIDE 0.4 MG: 0.4 CAPSULE ORAL at 07:47

## 2021-01-03 RX ADMIN — DEXTROSE AND SODIUM CHLORIDE 1000 ML: 5; 900 INJECTION, SOLUTION INTRAVENOUS at 12:07

## 2021-01-03 RX ADMIN — LISINOPRIL 20 MG: 20 TABLET ORAL at 06:08

## 2021-01-03 ASSESSMENT — ENCOUNTER SYMPTOMS
FOCAL WEAKNESS: 0
SHORTNESS OF BREATH: 0
SORE THROAT: 0
NAUSEA: 0
WEAKNESS: 1
VOMITING: 0
COUGH: 0
ABDOMINAL PAIN: 0
CONSTIPATION: 1
DIARRHEA: 0

## 2021-01-03 NOTE — PROGRESS NOTES
Hospital Medicine Daily Progress Note    Date of Service  1/3/2021    Chief Complaint  75 y.o. male admitted 1/2/2021 with fatigue and shortness of breath    Hospital Course  75 y.o. male who presented 1/2/2021 with worsening fatigue and complaints of trouble breathing while at skilled nursing recovering from GI bleeding and concurrent COVID.  He states he has been feeling more and fatigued and short of breath but he has been eating and drinking an minimally since admission.  He was here recently with GI bleeding and taken off his anticoagulation Eliquis for atrial fibrillation.  He has a chronic indwelling crooks without evidence of infection on UA.  H/h has improved since discharge.    Interval Problem Update  Overnight patient's hemoglobin decreased from 11.1-8.8.  Repeat CBC.  Patient has had multiple episodes of hypoglycemia.  He does report poor p.o. intake which she attributes to his loss of taste.  He denies any difficulty swallowing.  Suspect that his hypoglycemia is partially due to poor oral intake and also due to his glipizide.  Glipizide was discontinued on admission and he continues on D5.  Troponin has been level at 292.  Suspect the elevated troponin is partially due to the patient's ADRIENNE and also possibly from demand ischemia from acute blood loss.  T-max 101.7, /65.    Consultants/Specialty  Cardiology    Code Status  Full Code    Disposition  Once medically cleared [facility] will likely [facility]    Review of Systems  Review of Systems   Constitutional: Positive for malaise/fatigue.   HENT: Negative for sore throat.    Respiratory: Negative for cough and shortness of breath.    Cardiovascular: Negative for chest pain.   Gastrointestinal: Positive for constipation. Negative for abdominal pain, diarrhea, nausea and vomiting.   Genitourinary: Positive for dysuria.   Neurological: Positive for weakness. Negative for focal weakness.   All other systems reviewed and are  negative.       Physical Exam  Temp:  [36.3 °C (97.3 °F)-38.7 °C (101.7 °F)] 36.4 °C (97.5 °F)  Pulse:  [] 94  Resp:  [16-28] 17  BP: (101-142)/(54-79) 118/54  SpO2:  [91 %-99 %] 99 %    Physical Exam  Vitals signs and nursing note reviewed.   Constitutional:       General: He is not in acute distress.     Appearance: Normal appearance. He is ill-appearing (Chronic). He is not toxic-appearing.   HENT:      Head: Normocephalic and atraumatic.   Eyes:      General: No scleral icterus.        Right eye: No discharge.         Left eye: No discharge.      Conjunctiva/sclera: Conjunctivae normal.   Cardiovascular:      Rate and Rhythm: Normal rate and regular rhythm.      Heart sounds: No murmur.   Pulmonary:      Effort: No respiratory distress.      Breath sounds: Normal breath sounds. No wheezing or rales.   Abdominal:      General: Abdomen is flat. There is no distension.      Tenderness: There is no abdominal tenderness. There is no guarding.   Musculoskeletal:         General: No tenderness.      Right lower leg: No edema.      Left lower leg: No edema.   Neurological:      Mental Status: He is alert and oriented to person, place, and time.      Cranial Nerves: No cranial nerve deficit.   Psychiatric:         Mood and Affect: Mood normal.         Thought Content: Thought content normal.         Judgment: Judgment normal.         Fluids    Intake/Output Summary (Last 24 hours) at 1/3/2021 1046  Last data filed at 1/3/2021 0757  Gross per 24 hour   Intake 1240 ml   Output 1145 ml   Net 95 ml       Laboratory  Recent Labs     01/02/21  1105 01/02/21  1851 01/03/21  0300   WBC 7.0 8.0 5.8   RBC 3.16* 3.62* 2.88*   HEMOGLOBIN 9.8* 11.1* 8.8*   HEMATOCRIT 30.2* 37.2* 27.6*   MCV 95.6 102.8* 95.8   MCH 31.0 30.7 30.6   MCHC 32.5* 29.8* 31.9*   RDW 60.0* 66.7* 61.0*   PLATELETCT 167 199 152*   MPV 11.4 11.1 11.5     Recent Labs     01/02/21  1105 01/02/21  1851 01/03/21  0300   SODIUM 134* 134* 133*   POTASSIUM 4.5  4.8 4.4   CHLORIDE 102 100 104   CO2 17* 15* 15*   GLUCOSE 46* 104* 74   BUN 38* 35* 30*   CREATININE 2.05* 1.86* 1.39   CALCIUM 7.8* 7.9* 7.3*     Recent Labs     01/02/21  1105   APTT 30.7   INR 1.05               Imaging  DX-CHEST-PORTABLE (1 VIEW)   Final Result      No acute cardiopulmonary abnormality.           Assessment/Plan  COVID-19- (present on admission)  Assessment & Plan  Currently on room air  Was at SNF recovering from this      Normocytic anemia  Assessment & Plan  Hemoglobin on admission was 11.1 this morning was 8.8.  No active signs of bleeding however patient's recent history of GI bleed as a possible cause for this.  Also may be due to to dilutional effect from IV fluids.  We will repeat the hemoglobin.    Hypoglycemia  Assessment & Plan  Patient has had multiple episodes of hypoglycemia since admission.  Suspect this is likely secondary to his glipizide which will be discontinued.  Recommend that this not be restarted on the patient since he is elderly and has a high risk of hypoglycemia with sulfonylurea medications  Continue D5 and monitor closely for hypoglycemia once glipizide washes out.    Acute kidney injury (HCC)  Assessment & Plan  Cr baseline less than 1.0  Currently 2.05  Aggressive IV hydration and trend  Patient is dehydrated.      Lactic acidosis  Assessment & Plan  Patient is not septic  Likely elevated secondary to dehydration.      Elevated troponin  Assessment & Plan  In setting of significant acute kidney injury  337 - discussed with cardiology, continue to trend, no intervention at this time, no heparin, no ASA  Avoid ASA/heparin given recent GI bleed      Dehydration  Assessment & Plan  IV hydration        Hyperlipidemia- (present on admission)  Assessment & Plan  Lipitor      Hypertension- (present on admission)  Assessment & Plan  Continue with home medications      Type 2 diabetes mellitus, with long-term current use of insulin (HCC)- (present on admission)  Assessment  & Plan  SSI, diabetic diet  Hold glipizide and metformin for now.      Debility- (present on admission)  Assessment & Plan  PT/OT evaluations  Poor PO intake since he transferred to SNF.      Urinary retention- (present on admission)  Assessment & Plan  Chronic indwelling crooks  UA without evidence of infection         VTE prophylaxis: SCDs

## 2021-01-03 NOTE — ASSESSMENT & PLAN NOTE
Patient has had multiple episodes of hypoglycemia since admission.  Suspect this is likely secondary to his glipizide which will be discontinued.  Recommend that this not be restarted on the patient since he is elderly and has a high risk of hypoglycemia with sulfonylurea medications  Continue D5 and monitor closely for hypoglycemia once glipizide washes out.

## 2021-01-03 NOTE — PROGRESS NOTES
Patient admitted to 301-2. Patient lethargic, and oriented X3. Medications given (See MAR). Denies pain, nausea, constipation, diarrhea. Assessment completed. SR on monitor. Call bell within reach. Patient in no acute distress when this RN left room. Bed alarm on.

## 2021-01-03 NOTE — ASSESSMENT & PLAN NOTE
Hemoglobin on admission was 11.1 this morning was 8.8.  No active signs of bleeding however patient's recent history of GI bleed as a possible cause for this.  Also may be due to to dilutional effect from IV fluids.  We will repeat the hemoglobin.

## 2021-01-03 NOTE — ED NOTES
Pt found to be fsbs 24 upon routine check. Pt given 50ml d50 per MAR. Pt is alert and oriented at this time. Pt answered questions appropriately. Will recheck sugar per policy.

## 2021-01-04 VITALS
TEMPERATURE: 99.4 F | RESPIRATION RATE: 16 BRPM | HEART RATE: 102 BPM | HEIGHT: 68 IN | SYSTOLIC BLOOD PRESSURE: 122 MMHG | WEIGHT: 161.16 LBS | BODY MASS INDEX: 24.42 KG/M2 | DIASTOLIC BLOOD PRESSURE: 62 MMHG | OXYGEN SATURATION: 95 %

## 2021-01-04 LAB
ANION GAP SERPL CALC-SCNC: 14 MMOL/L (ref 7–16)
BASOPHILS # BLD AUTO: 0.5 % (ref 0–1.8)
BASOPHILS # BLD: 0.03 K/UL (ref 0–0.12)
BUN SERPL-MCNC: 20 MG/DL (ref 8–22)
CALCIUM SERPL-MCNC: 8 MG/DL (ref 8.4–10.2)
CHLORIDE SERPL-SCNC: 105 MMOL/L (ref 96–112)
CO2 SERPL-SCNC: 18 MMOL/L (ref 20–33)
CREAT SERPL-MCNC: 1 MG/DL (ref 0.5–1.4)
EOSINOPHIL # BLD AUTO: 0.01 K/UL (ref 0–0.51)
EOSINOPHIL NFR BLD: 0.2 % (ref 0–6.9)
ERYTHROCYTE [DISTWIDTH] IN BLOOD BY AUTOMATED COUNT: 63.6 FL (ref 35.9–50)
GLUCOSE BLD-MCNC: 100 MG/DL (ref 65–99)
GLUCOSE BLD-MCNC: 139 MG/DL (ref 65–99)
GLUCOSE SERPL-MCNC: 118 MG/DL (ref 65–99)
HCT VFR BLD AUTO: 33.4 % (ref 42–52)
HGB BLD-MCNC: 10.3 G/DL (ref 14–18)
IMM GRANULOCYTES # BLD AUTO: 0.04 K/UL (ref 0–0.11)
IMM GRANULOCYTES NFR BLD AUTO: 0.7 % (ref 0–0.9)
LYMPHOCYTES # BLD AUTO: 1.34 K/UL (ref 1–4.8)
LYMPHOCYTES NFR BLD: 22.8 % (ref 22–41)
MCH RBC QN AUTO: 30.7 PG (ref 27–33)
MCHC RBC AUTO-ENTMCNC: 30.8 G/DL (ref 33.7–35.3)
MCV RBC AUTO: 99.7 FL (ref 81.4–97.8)
MONOCYTES # BLD AUTO: 0.46 K/UL (ref 0–0.85)
MONOCYTES NFR BLD AUTO: 7.8 % (ref 0–13.4)
NEUTROPHILS # BLD AUTO: 4.01 K/UL (ref 1.82–7.42)
NEUTROPHILS NFR BLD: 68 % (ref 44–72)
NRBC # BLD AUTO: 0 K/UL
NRBC BLD-RTO: 0 /100 WBC
PLATELET # BLD AUTO: 155 K/UL (ref 164–446)
PMV BLD AUTO: 11.5 FL (ref 9–12.9)
POTASSIUM SERPL-SCNC: 4.3 MMOL/L (ref 3.6–5.5)
RBC # BLD AUTO: 3.35 M/UL (ref 4.7–6.1)
SODIUM SERPL-SCNC: 137 MMOL/L (ref 135–145)
WBC # BLD AUTO: 5.9 K/UL (ref 4.8–10.8)

## 2021-01-04 PROCEDURE — A9270 NON-COVERED ITEM OR SERVICE: HCPCS | Performed by: INTERNAL MEDICINE

## 2021-01-04 PROCEDURE — 85025 COMPLETE CBC W/AUTO DIFF WBC: CPT

## 2021-01-04 PROCEDURE — 99239 HOSP IP/OBS DSCHRG MGMT >30: CPT | Performed by: STUDENT IN AN ORGANIZED HEALTH CARE EDUCATION/TRAINING PROGRAM

## 2021-01-04 PROCEDURE — 80048 BASIC METABOLIC PNL TOTAL CA: CPT

## 2021-01-04 PROCEDURE — 700102 HCHG RX REV CODE 250 W/ 637 OVERRIDE(OP): Performed by: INTERNAL MEDICINE

## 2021-01-04 PROCEDURE — 82962 GLUCOSE BLOOD TEST: CPT | Mod: 91

## 2021-01-04 RX ORDER — LISINOPRIL 20 MG/1
20 TABLET ORAL DAILY
Qty: 30 TAB | Status: SHIPPED
Start: 2021-01-04

## 2021-01-04 RX ORDER — INSULIN LISPRO 100 [IU]/ML
2-10 INJECTION, SOLUTION INTRAVENOUS; SUBCUTANEOUS
Status: SHIPPED
Start: 2021-01-04

## 2021-01-04 RX ORDER — FOLIC ACID 1 MG/1
1 TABLET ORAL DAILY
Qty: 30 TAB | Refills: 0 | Status: SHIPPED
Start: 2021-01-04

## 2021-01-04 RX ORDER — SUCRALFATE 1 G/1
1 TABLET ORAL EVERY 6 HOURS
Qty: 120 TAB | Refills: 3 | Status: SHIPPED
Start: 2021-01-04

## 2021-01-04 RX ORDER — ATORVASTATIN CALCIUM 40 MG/1
40 TABLET, FILM COATED ORAL DAILY
Qty: 30 TAB | Status: SHIPPED
Start: 2021-01-04

## 2021-01-04 RX ORDER — TAMSULOSIN HYDROCHLORIDE 0.4 MG/1
0.4 CAPSULE ORAL
Qty: 30 CAP | Refills: 0 | Status: SHIPPED
Start: 2021-01-04

## 2021-01-04 RX ORDER — VITAMIN B COMPLEX
1000 TABLET ORAL DAILY
Qty: 60 TAB | Status: SHIPPED
Start: 2021-01-04

## 2021-01-04 RX ORDER — ACETAMINOPHEN 325 MG/1
650 TABLET ORAL EVERY 4 HOURS PRN
Qty: 30 TAB | Refills: 0 | Status: SHIPPED
Start: 2021-01-04

## 2021-01-04 RX ORDER — MIRTAZAPINE 15 MG/1
15 TABLET, FILM COATED ORAL
Qty: 30 TAB | Refills: 0 | Status: SHIPPED
Start: 2021-01-04

## 2021-01-04 RX ADMIN — SENNOSIDES-DOCUSATE SODIUM TAB 8.6-50 MG 2 TABLET: 8.6-5 TAB at 06:12

## 2021-01-04 RX ADMIN — SUCRALFATE 1 G: 1 TABLET ORAL at 11:12

## 2021-01-04 RX ADMIN — SUCRALFATE 1 G: 1 TABLET ORAL at 06:11

## 2021-01-04 RX ADMIN — SUCRALFATE 1 G: 1 TABLET ORAL at 00:42

## 2021-01-04 RX ADMIN — ZINC SULFATE 220 MG (50 MG) CAPSULE 220 MG: CAPSULE at 06:12

## 2021-01-04 RX ADMIN — TAMSULOSIN HYDROCHLORIDE 0.4 MG: 0.4 CAPSULE ORAL at 07:49

## 2021-01-04 NOTE — DISCHARGE PLANNING
Agency/Facility Name: [facility]   Referral sent per Choice form @ 7268  Patient is a return Patient     @1300  Pt accepted back. Left a VM to arrange transport.    @1305  Pt will transport at 1500 with the [facility]  Notified Maylin DURON via Voalte

## 2021-01-04 NOTE — DIETARY
"Nutrition services: Day 2 of admit.  Lakhwinder Vega is a 75 y.o. male with admitting DX of Elevated troponin. DX includes COVID-19, normocytic anemia, hypoglycemia, acute kidney injury, hyperlipidemia, HTN, DM 2.     Pt with poor PO intake noted due to lack of taste (per MD note)      Assessment:  Height: 172.7 cm (5' 8\")  Weight: 73.1 kg (161 lb 2.5 oz)  Body mass index is 24.5 kg/m²., BMI classification: WNL  Diet/Intake: consistent CHO (diabetic) diet/ <25% at dinner on 1/2/21    Evaluation:   1. Report of poor PO per MD note due to lack of taste. Pt was also experiencing hypoglycemia which was associated with poor PO intake. MD held glipizide and metformin for now. MD also ordered supplement with meals. RD clarified order for supplements with dietary. RD is not able to visit pt in his room due to dept policy for COVID infection. RD tried to contact pt x 3 to discuss preferences but he did not answer the phone.   2. Labs: glucose accu: 1/2/21=24. 1/3-4:   3. Meds: SSI, Remeron, zinc sulfate    Malnutrition Risk: criteria not met but risk noted due to poor PO.    Recommendations/Plan:  1. Add Boost Glucose Control to meals TID   2. Encourage intake of >50%  3. Document intake of all meals and supplements  as % taken in ADL's to provide interdisciplinary communication across all shifts.   4. Monitor weight.  5. Nutrition rep will continue to see patient for ongoing meal and snack preferences.     RD will follow.      "

## 2021-01-04 NOTE — PROGRESS NOTES
Telemetry Shift Summary    RHYTHM:SR-ST  HR RANGE:  ECTOPY:R PAC  MEASUREMENTS:0.16/0.10/0.36    Normal Measurements  Rhythm SR  HR Range:   Measurements: 0.12-0.20/0.06-0.10/0.30-0.52    Tele strips reviewed and placed in chart.

## 2021-01-04 NOTE — DISCHARGE SUMMARY
Discharge Summary    CHIEF COMPLAINT ON ADMISSION  Chief Complaint   Patient presents with   • Weakness       Reason for Admission  EMS     CODE STATUS  Full Code    HPI & HOSPITAL COURSE  75 y.o. male who presented 1/2/2021 with worsening fatigue and complaints of trouble breathing while at skilled nursing recovering from GI bleeding and concurrent COVID.  He states he has been feeling more and fatigued and short of breath but he has been eating and drinking an minimally since admission.  He was here recently with GI bleeding and taken off his anticoagulation Eliquis for atrial fibrillation.  He has a chronic indwelling crooks without evidence of infection on UA.  H/h has improved since discharge.    Overnight patient's hemoglobin decreased from 11.1-8.8.  Repeat CBC.  Patient has had multiple episodes of hypoglycemia.  He does report poor p.o. intake which she attributes to his loss of taste.  He denies any difficulty swallowing.  Suspect that his hypoglycemia is partially due to poor oral intake and also due to his glipizide.  Glipizide was discontinued on admission and he continues on D5.  Troponin has been level at 292.  Suspect the elevated troponin is partially due to the patient's ADRIENNE and also possibly from demand ischemia from acute blood loss.    Patient is feeling better today. No further episodes of hypoglycemia. Home medication glipzide discontinued. Patient is eating more. Hemoglobin has been stable 10.3. Continue to hold off on anticoagulation. ADRIENNE resolved creatinine back to baseline of 1.0      Therefore, he is discharged in good and stable condition to skilled nursing facility.    The patient met 2-midnight criteria for an inpatient stay at the time of discharge.      FOLLOW UP ITEMS POST DISCHARGE  1/4/2021    DISCHARGE DIAGNOSES  Active Problems:    Normocytic anemia POA: Unknown    COVID-19 POA: Yes    Acute kidney injury (HCC) POA: Unknown    Hypoglycemia POA: Unknown    Debility POA: Yes     Type 2 diabetes mellitus, with long-term current use of insulin (HCC) POA: Yes    Hypertension POA: Yes    Hyperlipidemia POA: Yes    Dehydration POA: Unknown    Elevated troponin POA: Unknown    Lactic acidosis POA: Unknown    Urinary retention POA: Yes  Resolved Problems:    * No resolved hospital problems. *      FOLLOW UP  No future appointments.  No follow-up provider specified.    MEDICATIONS ON DISCHARGE     Medication List      CHANGE how you take these medications      Instructions   acetaminophen 325 MG Tabs  What changed: reasons to take this  Commonly known as: Tylenol   Take 2 Tabs by mouth every four hours as needed for Mild Pain.  Dose: 650 mg     atorvastatin 40 MG Tabs  What changed: when to take this  Commonly known as: LIPITOR   Take 1 Tab by mouth every day.  Dose: 40 mg     insulin lispro 100 UNIT/ML Sopn injection PEN  What changed: additional instructions  Commonly known as: HumaLOG KwikPen   Inject 2-10 Units under the skin 3 times a day before meals. Sliding Scale  150-200= 0 units  201-250= 2 units  251-300= 4 units  301-350= 6 units  351-400= 8 units  Dose: 2-10 Units        CONTINUE taking these medications      Instructions   folic acid 1 MG Tabs  Commonly known as: FOLVITE   Take 1 Tab by mouth every day.  Dose: 1 mg     lisinopril 20 MG Tabs  Commonly known as: PRINIVIL   Take 1 Tab by mouth every day.  Dose: 20 mg     metFORMIN 500 MG Tabs  Commonly known as: GLUCOPHAGE   Take 1 Tab by mouth 2 times a day, with meals.  Dose: 500 mg     metoprolol 25 MG Tabs  Commonly known as: LOPRESSOR   Take 1 Tab by mouth 2 Times a Day.  Dose: 25 mg     mirtazapine 15 MG Tabs  Commonly known as: Remeron   Take 1 Tab by mouth every bedtime.  Dose: 15 mg     Multi-Vitamin Tabs   Take 1 Tab by mouth every day.  Dose: 1 Tab     sucralfate 1 GM Tabs  Commonly known as: CARAFATE   Take 1 Tab by mouth every 6 hours.  Dose: 1 g     tamsulosin 0.4 MG capsule  Commonly known as: FLOMAX   Take 1 Cap by mouth  1/2 hour after breakfast.  Dose: 0.4 mg     Vitamin B12 1000 MCG Tbcr   Take 1,000 mcg by mouth every day.  Dose: 1,000 mcg     vitamin D 1000 Unit (25 mcg) Tabs  Commonly known as: cholecalciferol   Take 1 Tab by mouth every day.  Dose: 1,000 Units        STOP taking these medications    Alogliptin Benzoate 12.5 MG Tabs     insulin regular 100 Unit/mL Soln  Commonly known as: HumuLIN R     zinc sulfate 220 (50 Zn) MG Caps  Commonly known as: ZINCATE            Allergies  Allergies   Allergen Reactions   • Pcn [Penicillins] Anaphylaxis and Hives     Hives on back of neck       DIET  Orders Placed This Encounter   Procedures   • Diet Order Diet: Level 6 - Soft and Bite Sized; Liquid level: Level 0 - Thin; Second Modifier: (optional): Consistent CHO (Diabetic)     Standing Status:   Standing     Number of Occurrences:   1     Order Specific Question:   Diet:     Answer:   Level 6 - Soft and Bite Sized [23]     Order Specific Question:   Liquid level     Answer:   Level 0 - Thin     Order Specific Question:   Second Modifier: (optional)     Answer:   Consistent CHO (Diabetic) [4]       ACTIVITY  As tolerated and directed by skilled nursing.  Weight bearing as tolerated    LINES, DRAINS, AND WOUNDS  This is an automated list. Peripheral IVs will be removed prior to discharge.  Peripheral IV 01/02/21 20 G Left Antecubital (Active)   Site Assessment Clean;Dry;Intact 01/04/21 0800   Dressing Type Transparent;Occlusive 01/04/21 0800   Line Status Blood return noted;Infusing;Scrubbed the hub prior to access;Saline locked;Flushed 01/04/21 0800   Dressing Status Clean;Dry;Intact 01/04/21 0800   Dressing Intervention Initial dressing 01/04/21 0800   Date Primary Tubing Changed 01/02/21 01/02/21 2100   Date Secondary Tubing Changed 01/02/21 01/02/21 2100       Peripheral IV 01/02/21 24 G Right Hand (Active)   Site Assessment Dry;Intact;Clean 01/04/21 0800   Dressing Type Occlusive;Transparent 01/04/21 0800   Line Status No blood  return;Scrubbed the hub prior to access;Saline locked;Flushed;Capped 01/04/21 0800   Dressing Status Dry;Intact;Old drainage 01/04/21 0800   Dressing Intervention Initial dressing 01/04/21 0800   Date Primary Tubing Changed 01/02/21 01/02/21 2100   Date Secondary Tubing Changed 01/02/21 01/02/21 2100     Urethral Catheter Latex 16 Fr. (Active)   Site Assessment Clean;Skin intact 01/04/21 0800   Collection Container Standard drainage bag 01/04/21 0800   Urinary Catheter Care Tamper Evident Seal in Place;Drainage Tube Extended;Drainage Bag Not Overfilled;Drainage Tubing Properly Secured;Drainage Bag Below Bladder Level and Not on Floor 01/04/21 0100   Securement Method Other (Comment) 01/04/21 0800   Output (mL) 225 mL 01/04/21 0345         Peripheral IV 01/02/21 20 G Left Antecubital (Active)   Site Assessment Clean;Dry;Intact 01/04/21 0800   Dressing Type Transparent;Occlusive 01/04/21 0800   Line Status Blood return noted;Infusing;Scrubbed the hub prior to access;Saline locked;Flushed 01/04/21 0800   Dressing Status Clean;Dry;Intact 01/04/21 0800   Dressing Intervention Initial dressing 01/04/21 0800   Date Primary Tubing Changed 01/02/21 01/02/21 2100   Date Secondary Tubing Changed 01/02/21 01/02/21 2100       Peripheral IV 01/02/21 24 G Right Hand (Active)   Site Assessment Dry;Intact;Clean 01/04/21 0800   Dressing Type Occlusive;Transparent 01/04/21 0800   Line Status No blood return;Scrubbed the hub prior to access;Saline locked;Flushed;Capped 01/04/21 0800   Dressing Status Dry;Intact;Old drainage 01/04/21 0800   Dressing Intervention Initial dressing 01/04/21 0800   Date Primary Tubing Changed 01/02/21 01/02/21 2100   Date Secondary Tubing Changed 01/02/21 01/02/21 2100           Urethral Catheter Latex 16 Fr. (Active)   Site Assessment Clean;Skin intact 01/04/21 0800   Collection Container Standard drainage bag 01/04/21 0800   Urinary Catheter Care Tamper Evident Seal in Place;Drainage Tube  Extended;Drainage Bag Not Overfilled;Drainage Tubing Properly Secured;Drainage Bag Below Bladder Level and Not on Floor 01/04/21 0100   Securement Method Other (Comment) 01/04/21 0800   Output (mL) 225 mL 01/04/21 0345        MENTAL STATUS ON TRANSFER  Level of Consciousness: Alert  Orientation : Oriented x 4  Speech: Speech Clear    CONSULTATIONS  none    PROCEDURES  none    LABORATORY  Lab Results   Component Value Date    SODIUM 137 01/04/2021    POTASSIUM 4.3 01/04/2021    CHLORIDE 105 01/04/2021    CO2 18 (L) 01/04/2021    GLUCOSE 118 (H) 01/04/2021    BUN 20 01/04/2021    CREATININE 1.00 01/04/2021        Lab Results   Component Value Date    WBC 5.9 01/04/2021    HEMOGLOBIN 10.3 (L) 01/04/2021    HEMATOCRIT 33.4 (L) 01/04/2021    PLATELETCT 155 (L) 01/04/2021        Total time of the discharge process exceeds 32 minutes.

## 2021-01-04 NOTE — PROGRESS NOTES
Report received from Monique BELTRÁN. Pt asleep at the time of report. Plan of care assumed. Safety precautions in place and call light within reach.

## 2021-01-04 NOTE — DISCHARGE INSTRUCTIONS
Discharge Summary    CHIEF COMPLAINT ON ADMISSION  Chief Complaint   Patient presents with   • Weakness       Reason for Admission  EMS     CODE STATUS  Full Code    HPI & HOSPITAL COURSE  This is a 75 y.o. male here with NSTEMI  No notes on file    Therefore, he is discharged in good and stable condition to skilled nursing facility.    The patient recovered much more quickly than anticipated on admission.      FOLLOW UP ITEMS POST DISCHARGE      DISCHARGE DIAGNOSES  Active Problems:    Normocytic anemia POA: Unknown    COVID-19 POA: Yes    Acute kidney injury (HCC) POA: Unknown    Hypoglycemia POA: Unknown    Debility POA: Yes    Type 2 diabetes mellitus, with long-term current use of insulin (HCC) POA: Yes    Hypertension POA: Yes    Hyperlipidemia POA: Yes    Dehydration POA: Unknown    Elevated troponin POA: Unknown    Lactic acidosis POA: Unknown    Urinary retention POA: Yes  Resolved Problems:    * No resolved hospital problems. *      FOLLOW UP  No future appointments.  No follow-up provider specified.    MEDICATIONS ON DISCHARGE     Medication List      CHANGE how you take these medications      Instructions   acetaminophen 325 MG Tabs  What changed: reasons to take this  Commonly known as: Tylenol   Take 2 Tabs by mouth every four hours as needed for Mild Pain.  Dose: 650 mg     atorvastatin 40 MG Tabs  What changed: when to take this  Commonly known as: LIPITOR   Take 1 Tab by mouth every day.  Dose: 40 mg     insulin lispro 100 UNIT/ML Sopn injection PEN  What changed: additional instructions  Commonly known as: HumaLOG KwikPen   Inject 2-10 Units under the skin 3 times a day before meals. Sliding Scale  150-200= 0 units  201-250= 2 units  251-300= 4 units  301-350= 6 units  351-400= 8 units  Dose: 2-10 Units        CONTINUE taking these medications      Instructions   folic acid 1 MG Tabs  Commonly known as: FOLVITE   Take 1 Tab by mouth every day.  Dose: 1 mg     lisinopril 20 MG Tabs  Commonly known  as: PRINIVIL   Take 1 Tab by mouth every day.  Dose: 20 mg     metFORMIN 500 MG Tabs  Commonly known as: GLUCOPHAGE   Take 1 Tab by mouth 2 times a day, with meals.  Dose: 500 mg     metoprolol 25 MG Tabs  Commonly known as: LOPRESSOR   Take 1 Tab by mouth 2 Times a Day.  Dose: 25 mg     mirtazapine 15 MG Tabs  Commonly known as: Remeron   Take 1 Tab by mouth every bedtime.  Dose: 15 mg     Multi-Vitamin Tabs   Take 1 Tab by mouth every day.  Dose: 1 Tab     sucralfate 1 GM Tabs  Commonly known as: CARAFATE   Take 1 Tab by mouth every 6 hours.  Dose: 1 g     tamsulosin 0.4 MG capsule  Commonly known as: FLOMAX   Take 1 Cap by mouth 1/2 hour after breakfast.  Dose: 0.4 mg     Vitamin B12 1000 MCG Tbcr   Take 1,000 mcg by mouth every day.  Dose: 1,000 mcg     vitamin D 1000 Unit (25 mcg) Tabs  Commonly known as: cholecalciferol   Take 1 Tab by mouth every day.  Dose: 1,000 Units        STOP taking these medications    Alogliptin Benzoate 12.5 MG Tabs     insulin regular 100 Unit/mL Soln  Commonly known as: HumuLIN R     zinc sulfate 220 (50 Zn) MG Caps  Commonly known as: ZINCATE            Allergies  Allergies   Allergen Reactions   • Pcn [Penicillins] Anaphylaxis and Hives     Hives on back of neck       DIET  Orders Placed This Encounter   Procedures   • Diet Order Diet: Level 6 - Soft and Bite Sized; Liquid level: Level 0 - Thin; Second Modifier: (optional): Consistent CHO (Diabetic)     Standing Status:   Standing     Number of Occurrences:   1     Order Specific Question:   Diet:     Answer:   Level 6 - Soft and Bite Sized [23]     Order Specific Question:   Liquid level     Answer:   Level 0 - Thin     Order Specific Question:   Second Modifier: (optional)     Answer:   Consistent CHO (Diabetic) [4]       ACTIVITY  As tolerated.  Weight bearing as tolerated    LINES, DRAINS, AND WOUNDS  This is an automated list. Peripheral IVs will be removed prior to discharge.     Urethral Catheter Latex 16 Fr. (Active)    Site Assessment Clean;Skin intact 01/04/21 0800   Collection Container Standard drainage bag 01/04/21 0800   Urinary Catheter Care Tamper Evident Seal in Place;Drainage Tube Extended;Drainage Bag Not Overfilled;Drainage Tubing Properly Secured;Drainage Bag Below Bladder Level and Not on Floor 01/04/21 0100   Securement Method Other (Comment) 01/04/21 0800   Output (mL) 225 mL 01/04/21 0345                Urethral Catheter Latex 16 Fr. (Active)   Site Assessment Clean;Skin intact 01/04/21 0800   Collection Container Standard drainage bag 01/04/21 0800   Urinary Catheter Care Tamper Evident Seal in Place;Drainage Tube Extended;Drainage Bag Not Overfilled;Drainage Tubing Properly Secured;Drainage Bag Below Bladder Level and Not on Floor 01/04/21 0100   Securement Method Other (Comment) 01/04/21 0800   Output (mL) 225 mL 01/04/21 0345        MENTAL STATUS ON TRANSFER  Level of Consciousness: Alert  Orientation : Oriented x 4  Speech: Speech Clear    CONSULTATIONS      PROCEDURES      LABORATORY  Lab Results   Component Value Date    SODIUM 137 01/04/2021    POTASSIUM 4.3 01/04/2021    CHLORIDE 105 01/04/2021    CO2 18 (L) 01/04/2021    GLUCOSE 118 (H) 01/04/2021    BUN 20 01/04/2021    CREATININE 1.00 01/04/2021        Lab Results   Component Value Date    WBC 5.9 01/04/2021    HEMOGLOBIN 10.3 (L) 01/04/2021    HEMATOCRIT 33.4 (L) 01/04/2021    PLATELETCT 155 (L) 01/04/2021        Total time of the discharge process.  Discharge Instructions    Discharged to other by medical transportation with escort. Discharged via wheelchair, hospital escort: Yes.  Special equipment needed: None    Be sure to schedule a follow-up appointment with your primary care doctor or any specialists as instructed.     Discharge Plan:   Diet Plan: Discussed  Activity Level: Discussed  Confirmed Follow up Appointment: Patient to Call and Schedule Appointment  Confirmed Symptoms Management: Discussed  Medication Reconciliation Updated: Yes    I  [Time Spent: ___ minutes] : I have spent [unfilled] minutes of time on the encounter. [>50% of the face to face encounter time was spent on counseling and/or coordination of care for ___] : Greater than 50% of the face to face encounter time was spent on counseling and/or coordination of care for [unfilled] understand that a diet low in cholesterol, fat, and sodium is recommended for good health. Unless I have been given specific instructions below for another diet, I accept this instruction as my diet prescription.   Other diet: Soft & Bite Sized, Controlled Carbohydrate    Special Instructions:     Patient Discharge Instructions  · Monitor your weight daily, and maintain a weight chart, to track your weight changes.   · Activity as tolerated, unless your Doctor has ordered otherwise.   · Follow a low fat, low cholesterol, low salt diet unless instructed otherwise by your Doctor. Read the labels on the back of food products and track your intake of fat, cholesterol and salt.   · Fluid Restriction No. If a Fluid Restriction has been ordered by your Doctor, measure fluids with a measuring cup to ensure that you are not exceeding the restriction.   · No smoking.  · Oxygen No. If your Doctor has ordered that you wear Oxygen at home, it is important to wear it as ordered.  · Did you receive an explanation from staff on the importance of taking each of your medications and why it is necessary to stay on the medications the physician/care provider has ordered? Yes  · Do you have any questions concerning how to manage your heart failure and what to do should you have any increased signs and symptoms after you go home? No  · Do you feel like your heart failure care team involved you in the care treatment plan and allowed you to make decisions regarding your care while in the hospital and addressed any discharge needs you might have? Yes    See the educational handout provided at discharge for more information on monitoring your daily weight, activity and diet. This also explains more about your symptoms of a flare-up and some of the tests that you have undergone.     Warning Signs of a Flare-Up include:  · Swelling in the ankles or lower legs.  · Shortness of breath, while at rest, or while doing normal activities.    · Shortness of breath at night when in bed, or coughing in bed.   · Requiring more pillows to sleep at night, or needing to sit up at night to sleep.  · Feeling weak, dizzy or fatigued.     When to call your Doctor:  · Call [facility] about questions regarding the discharge instructions you were given [phone].  (Discharge Unit )  · Call your Primary Care Physician or Cardiologist if:   1. You experience any pain radiating to your jaw or neck.  2. You have any difficulty breathing.  3. You experience weight gain of 2 lbs in a day or 5 lbs in a week.   4. You feel any palpitations or irregular heartbeats.  5. You become dizzy or lose consciousness.   If you have had an angiogram or had a pacemaker or AICD placed, and experience:  1. Bleeding, drainage or swelling at the surgical / puncture site.  2. Fever greater than 100.0 F  3. Shock from internal defibrillator.  4. Cool and / or numb extremities.      · Is patient discharged on Warfarin / Coumadin?   No     Depression / Suicide Risk    As you are discharged from this [facility], it is important to learn how to keep safe from harming yourself.    Recognize the warning signs:  · Abrupt changes in personality, positive or negative- including increase in energy   · Giving away possessions  · Change in eating patterns- significant weight changes-  positive or negative  · Change in sleeping patterns- unable to sleep or sleeping all the time   · Unwillingness or inability to communicate  · Depression  · Unusual sadness, discouragement and loneliness  · Talk of wanting to die  · Neglect of personal appearance   · Rebelliousness- reckless behavior  · Withdrawal from people/activities they love  · Confusion- inability to concentrate     If you or a loved one observes any of these behaviors or has concerns about self-harm, here's what you can do:  · Talk about it- your feelings and reasons for harming yourself  · Remove any means  that you might use to hurt yourself (examples: pills, rope, extension cords, firearm)  · Get professional help from the community (Mental Health, Substance Abuse, psychological counseling)  · Do not be alone:Call your Safe Contact- someone whom you trust who will be there for you.  · Call your local CRISIS HOTLINE 158-5912 or 427-800-8478  · Call your local Children's Mobile Crisis Response Team [place] (210) 254-0695 or www.[place]PNR  · Call the toll free National Suicide Prevention Hotlines   · National Suicide Prevention Lifeline 270-420-PMXE (6090)  · National Hope Line Network 800-SUICIDE (803-5360)

## 2021-01-04 NOTE — PROGRESS NOTES
MD alerted to no standing orders for pt's chronic crooks catheter, present on admission. Crooks changed per new orders. Safety precautions in place and call light within reach.

## 2021-01-06 LAB
BACTERIA UR CULT: ABNORMAL
BACTERIA UR CULT: ABNORMAL
SIGNIFICANT IND 70042: ABNORMAL
SITE SITE: ABNORMAL
SOURCE SOURCE: ABNORMAL

## 2021-01-07 LAB
BACTERIA BLD CULT: NORMAL
BACTERIA BLD CULT: NORMAL
SIGNIFICANT IND 70042: NORMAL
SIGNIFICANT IND 70042: NORMAL
SITE SITE: NORMAL
SITE SITE: NORMAL
SOURCE SOURCE: NORMAL
SOURCE SOURCE: NORMAL

## 2021-01-12 DIAGNOSIS — Z23 NEED FOR VACCINATION: ICD-10-CM

## 2021-02-10 ENCOUNTER — HOSPITAL ENCOUNTER (OUTPATIENT)
Dept: RADIOLOGY | Facility: MEDICAL CENTER | Age: 76
End: 2021-02-10
Attending: INTERNAL MEDICINE
Payer: COMMERCIAL

## 2021-02-10 DIAGNOSIS — I26.99 PULMONARY EMBOLISM, UNSPECIFIED CHRONICITY, UNSPECIFIED PULMONARY EMBOLISM TYPE, UNSPECIFIED WHETHER ACUTE COR PULMONALE PRESENT (HCC): ICD-10-CM

## 2021-02-10 PROCEDURE — 71275 CT ANGIOGRAPHY CHEST: CPT

## 2021-02-10 PROCEDURE — 700117 HCHG RX CONTRAST REV CODE 255: Performed by: INTERNAL MEDICINE

## 2021-02-10 RX ADMIN — IOHEXOL 65 ML: 350 INJECTION, SOLUTION INTRAVENOUS at 16:21

## 2024-08-02 NOTE — ED NOTES
Covid swab sent to lab.   not harm you if you swallow a little. Avoid blowing your nose for about 15 minutes (this is especially helpful if the solution sometimes gets trapped in your ears).  Some people experience a little burning sensation the first few times that they use buffered saline solution, but this usually goes away after they adapt to it.   Adapted with permission from: Diseases of the Sinuses: Diagnosis and Management. Neftali WEINSTEIN, Rich RAMÍREZ, Nina VASQUEZ (Eds), [facility] 2001. Copyright © [employer].  Graphic 58675 Version 17.0  © 2024 [employer] and/or its affiliates. All Rights Reserved.

## 2024-12-07 NOTE — ASSESSMENT & PLAN NOTE
Bed: FT02  Expected date:   Expected time:   Means of arrival:   Comments:  Hold for 31   Currently on room air  Was at [facility] recovering from this